# Patient Record
Sex: FEMALE | Race: WHITE | NOT HISPANIC OR LATINO | Employment: FULL TIME | URBAN - METROPOLITAN AREA
[De-identification: names, ages, dates, MRNs, and addresses within clinical notes are randomized per-mention and may not be internally consistent; named-entity substitution may affect disease eponyms.]

---

## 2017-01-20 ENCOUNTER — ALLSCRIPTS OFFICE VISIT (OUTPATIENT)
Dept: OTHER | Facility: OTHER | Age: 37
End: 2017-01-20

## 2017-03-22 ENCOUNTER — GENERIC CONVERSION - ENCOUNTER (OUTPATIENT)
Dept: OTHER | Facility: OTHER | Age: 37
End: 2017-03-22

## 2018-01-12 VITALS
HEIGHT: 64 IN | SYSTOLIC BLOOD PRESSURE: 104 MMHG | BODY MASS INDEX: 21.37 KG/M2 | WEIGHT: 125.19 LBS | HEART RATE: 66 BPM | RESPIRATION RATE: 12 BRPM | DIASTOLIC BLOOD PRESSURE: 70 MMHG

## 2018-01-12 NOTE — PROGRESS NOTES
Assessment    1  Annual physical exam (V70 0) (Z00 00)    A/P  1  physical exam: we have conducted this and have completed your form  we have also given you a flu shot today    rto prn    Pap test is due after 03/20/2017  Plan  Annual physical exam    · Follow-up visit in 1 year Evaluation and Treatment  Follow-up  Status: Complete  Done:  [id number]    Chief Complaint  Patient presents to the office today for work PE  PPD results from 02/2016 were printed to give to the patient  She will check if this is OK for her employer; if not she will come back  Pap test is due after 03/20/2017  Flu shot is deferred for 2016/2017  Tdap was last administered on 06/02/2015  History of Present Illness  HPI: Here today for pe for work    starting a new job       has not had any alcohol now for 9 months and is feeling great  continues to attend meetings and has a great support network  Review of Systems    Constitutional: No fever, no chills, feels well, no tiredness, no recent weight gain or weight loss  ENT: no complaints of earache, no loss of hearing, no nose bleeds, no nasal discharge, no sore throat, no hoarseness  Cardiovascular: No complaints of slow heart rate, no fast heart rate, no chest pain, no palpitations, no leg claudication, no lower extremity edema  Respiratory: No complaints of shortness of breath, no wheezing, no cough, no SOB on exertion, no orthopnea, no PND  Gastrointestinal: No complaints of abdominal pain, no constipation, no nausea or vomiting, no diarrhea, no bloody stools  Genitourinary: No complaints of dysuria, no incontinence, no pelvic pain, no dysmenorrhea, no vaginal discharge or bleeding  Musculoskeletal: No complaints of arthralgias, no myalgias, no joint swelling or stiffness, no limb pain or swelling  Integumentary: No complaints of skin rash or lesions, no itching, no skin wounds, no breast pain or lump     Neurological: No complaints of headache, no confusion, no convulsions, no numbness, no dizziness or fainting, no tingling, no limb weakness, no difficulty walking  Psychiatric: Not suicidal, no sleep disturbance, no anxiety or depression, no change in personality, no emotional problems  Active Problems    1  Alcohol related seizure (780 39) (R56 9)   2  Alcoholism (303 90) (F10 20)   3  Alcoholism in remission (303 93) (F10 21)   4  ASCUS favor benign (796 9)   5  Blood tests for routine general physical examination (V72 62) (Z00 00)   6  Dysfunctional uterine bleeding (626 8) (N93 8)   7  Encounter for routine gynecological examination (V72 31) (Z01 419)   8  HPV (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 4) (A63 0)   9  Need for prophylactic vaccination and inoculation against influenza (V04 81) (Z23)   10  Need for vaccination for DTaP (V06 1) (Z23)   11  Other insomnia (780 52) (G47 09)   12  Screening for HPV (human papillomavirus) (V73 81) (Z11 51)   13   Tuberculosis screening (V74 1) (Z11 1)    Past Medical History    · History of Bruising, spontaneous (782 7) (R23 3)   · History of Endometrial polyp (621 0) (N84 0)   · History of Mastodynia (611 71) (N64 4)   · History of Nulliparity (V61 8)   · History of Prolonged menstruation (626 2) (N92 1)   · History of Well adult on routine health check (V70 0) (Z00 00)   · History of [place] teeth extracted (525 50,525 10) (K08 499)    Surgical History    · History of Closed Treatment Of Mandibular Fracture On The Left   · History of Colposcopy Cervix With Biopsy(S) With Endocervical Curettage   · History of Dilation And Curettage   · History of Inguinal Hernia Repair   · History of Knee Arthroscopy   · History of Nose Surgery    Family History  Mother    · Family history of Family Health Status Of Mother - Good  Father    · Family history of Hyperlipidemia   · Family history of Hypertension (V17 49)  Maternal Grandmother    · Family history of Osteoporosis (V17 81)   · Family history of Tuberculosis  Paternal Grandmother    · Family history of Alzheimer Disease  Maternal Grandfather    · Family history of Stroke Syndrome (V17 1)  Paternal Grandfather    · Family history of Alzheimer Disease    Social History    · Daily caffeinated coffee consumption   · Denied: History of Drug Use   · Exercises moderately 3 or more times a week   · Former consumption of alcohol (V11 3) (Q83 180)   · Never a smoker   · Occasional caffeine consumption   · Denied: History of Uses drugs daily    Current Meds   1  TraZODone HCl - 50 MG Oral Tablet; take 1 to 2 tablets at bedtime; Therapy: 76HZM2815 to (Evaluate:40Mea4243)  Requested for: 22Ssq1720; Last   Rx:56Sqt3489 Ordered    Allergies    1  Sulfa Drugs    2  No Known Environmental Allergies   3  No Known Food Allergies    Vitals   Recorded: 35KNN7881 51:87TA   Systolic 619   Diastolic 62   Heart Rate 76   Respiration 12   Height 5 ft 4 5 in   Weight 125 lb 6 08 oz   BMI Calculated 21 19   BSA Calculated 1 61     Physical Exam    Constitutional   General appearance: No acute distress, well appearing and well nourished  Eyes   Pupils and irises: Equal, round, reactive to light  Ophthalmoscopic examination: Normal fundi and optic discs  Ears, Nose, Mouth, and Throat   Otoscopic examination: Tympanic membranes translucent with normal light reflex  Canals patent without erythema  Nasal mucosa, septum, and turbinates: Normal without edema or erythema  Lips, teeth, and gums: Normal, good dentition  Oropharynx: Normal with no erythema, edema, exudate or lesions  Neck   Neck: Supple, symmetric, trachea midline, no masses  Pulmonary   Auscultation of lungs: Clear to auscultation  Cardiovascular   Auscultation of heart: Normal rate and rhythm, normal S1 and S2, no murmurs  Abdomen   Abdomen: Non-tender, no masses  Liver and spleen: No hepatomegaly or splenomegaly  Lymphatic   Palpation of lymph nodes in neck: No lymphadenopathy      Musculoskeletal   Gait and station: Normal     Range of motion: Normal     Skin   Skin and subcutaneous tissue: Normal without rashes or lesions  Neurologic   Reflexes: 2+ and symmetric  Psychiatric   Orientation to person, place, and time: Normal     Mood and affect: Normal        Health Management  Encounter for routine gynecological examination   (1) THIN PREP PAP WITH IMAGING; every 2 years; Last 05YHN4768; Next Due:  20Mar2017;  Active    Signatures   Electronically signed by : Jaleesa Osorio; Nov 17 2016 10:55AM EST                       (Author)    Electronically signed by : Damon Esquivel DO; Nov 17 2016  1:37PM EST
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
## 2018-01-15 NOTE — MISCELLANEOUS
Message   Patient missed work the week of 6-6-16 to return on mon 6-13-16   Return to work or school:   Sheridan Malave is under my professional care   She was seen in my office on 06-   She is able to return to work on  06-            [address] [place] [address]    Electronically signed by : Lucrecia Alejandro, ; Jun 9 2016 12:28PM EST                       (Author)

## 2018-01-15 NOTE — PROCEDURES
Procedures by Mukesh Sahu MD at 2016   2:00 PM      Author:  Mukesh Sahu MD Service:  Neurology Author Type:  Physician     Filed:  2016  3:26 PM Date of Service:  2016  2:00 PM Status:  Signed     :  Mukesh Sahu MD (Physician)            ELECTROENCEPHALOGRAM (EEG)      Patient Name:  Christie Wang  MRN: [id number]   :  1980 File #: [facility]    Age: 38 y o  Encounter #: [id number]   Date performed: 2016            Report date: 2016          Study type: Routine EEG    ICD 10 diagnosis: Spells/Fit NOS R56 9    Start time: 08:32 End time: 09:04     -------------------------------------------------------------------------------------------------------------------   Patient History: This recording was observed in a 38 y o  female with history of 2 seizures  in setting of alcohol use to determine risk of epilepsy  Medications: none listed    -------------------------------------------------------------------------------------------------------------------   Description of Procedure:  ·  32 channel digital recording with electrodes placed according to the International 10-20 system with additional  T1/T2 electrodes, EOG, EKG, and simultaneous  video  The recording was technically satisfactory  -------------------------------------------------------------------------------------------------------------------   EEG Description:    The recording was performed with the patient awake and drowsy  She was  fully oriented  During wakefulness, there were long runs of well regulated, low amplitude, posteriorly  dominant, symmetric 11 cps alpha rhythm that attenuate with eye opening  There were symmetric low amplitude, frontally dominant beta activities  With drowsiness, alpha activity attenuated and was replaced by diffusely distributed theta and beta activities   During drowsiness, there was a single  sharp transient that was isolated to T4, without an adjacent field and without a clear aftercoming slow wave  Deeper sleep was not attained  -------------------------------------------------------------------------------------------------------------------   Activation Procedures:  Hyperventilation was performed for 3-4  minutes and did not produce any changes  Stepped photic stimulation between 1-20 cps was performed and induced symmetric  photic driving  Other findings: The single lead ECG demonstrated a regular rhythm     -------------------------------------------------------------------------------------------------------------------   EEG Interpretation: This Routine EEG recorded during wakefulness and drowsiness is normal         Donna Camarena MD   [address] [facility]               Received for:Axel BRUNER    Jul 14 2016  3:25PM [facility] Standard Time

## 2018-01-18 ENCOUNTER — ALLSCRIPTS OFFICE VISIT (OUTPATIENT)
Dept: OTHER | Facility: OTHER | Age: 38
End: 2018-01-18

## 2018-01-18 ENCOUNTER — APPOINTMENT (EMERGENCY)
Dept: RADIOLOGY | Facility: HOSPITAL | Age: 38
End: 2018-01-18
Payer: COMMERCIAL

## 2018-01-18 ENCOUNTER — HOSPITAL ENCOUNTER (OUTPATIENT)
Facility: HOSPITAL | Age: 38
Setting detail: OBSERVATION
Discharge: HOME | End: 2018-01-20
Attending: EMERGENCY MEDICINE | Admitting: HOSPITALIST
Payer: COMMERCIAL

## 2018-01-18 DIAGNOSIS — F10.239 ALCOHOL WITHDRAWAL (HCC): Primary | ICD-10-CM

## 2018-01-18 DIAGNOSIS — E87.6 HYPOKALEMIA: ICD-10-CM

## 2018-01-18 DIAGNOSIS — R56.9 WITHDRAWAL SEIZURES, WITH UNSPECIFIED COMPLICATION (HCC): ICD-10-CM

## 2018-01-18 DIAGNOSIS — R44.3 HALLUCINATIONS: ICD-10-CM

## 2018-01-18 DIAGNOSIS — F19.239 WITHDRAWAL SEIZURES, WITH UNSPECIFIED COMPLICATION (HCC): ICD-10-CM

## 2018-01-18 LAB
ALBUMIN SERPL BCP-MCNC: 4.1 G/DL (ref 3.5–5)
ALP SERPL-CCNC: 113 U/L (ref 46–116)
ALT SERPL W P-5'-P-CCNC: 88 U/L (ref 12–78)
AMPHETAMINES SERPL QL SCN: NEGATIVE
ANION GAP SERPL CALCULATED.3IONS-SCNC: 8 MMOL/L (ref 4–13)
AST SERPL W P-5'-P-CCNC: 142 U/L (ref 5–45)
BARBITURATES UR QL: NEGATIVE
BENZODIAZ UR QL: NEGATIVE
BILIRUB SERPL-MCNC: 2.06 MG/DL (ref 0.2–1)
BUN SERPL-MCNC: 10 MG/DL (ref 5–25)
CALCIUM SERPL-MCNC: 9.4 MG/DL (ref 8.3–10.1)
CHLORIDE SERPL-SCNC: 98 MMOL/L (ref 100–108)
CO2 SERPL-SCNC: 29 MMOL/L (ref 21–32)
COCAINE UR QL: NEGATIVE
CREAT SERPL-MCNC: 0.73 MG/DL (ref 0.6–1.3)
ETHANOL EXG-MCNC: 0 MG/DL
EXT PREG TEST URINE: NEGATIVE
GFR SERPL CREATININE-BSD FRML MDRD: 104 ML/MIN/1.73SQ M
GLUCOSE SERPL-MCNC: 82 MG/DL (ref 65–140)
MAGNESIUM SERPL-MCNC: 1.7 MG/DL (ref 1.6–2.6)
METHADONE UR QL: NEGATIVE
OPIATES UR QL SCN: NEGATIVE
PCP UR QL: NEGATIVE
PHOSPHATE SERPL-MCNC: 3.6 MG/DL (ref 2.7–4.5)
POTASSIUM SERPL-SCNC: 3.3 MMOL/L (ref 3.5–5.3)
PROT SERPL-MCNC: 8.2 G/DL (ref 6.4–8.2)
SODIUM SERPL-SCNC: 135 MMOL/L (ref 136–145)
THC UR QL: NEGATIVE

## 2018-01-18 PROCEDURE — 84100 ASSAY OF PHOSPHORUS: CPT | Performed by: EMERGENCY MEDICINE

## 2018-01-18 PROCEDURE — 80307 DRUG TEST PRSMV CHEM ANLYZR: CPT | Performed by: EMERGENCY MEDICINE

## 2018-01-18 PROCEDURE — 96365 THER/PROPH/DIAG IV INF INIT: CPT

## 2018-01-18 PROCEDURE — 70450 CT HEAD/BRAIN W/O DYE: CPT

## 2018-01-18 PROCEDURE — 81025 URINE PREGNANCY TEST: CPT | Performed by: EMERGENCY MEDICINE

## 2018-01-18 PROCEDURE — 82075 ASSAY OF BREATH ETHANOL: CPT | Performed by: EMERGENCY MEDICINE

## 2018-01-18 PROCEDURE — 93005 ELECTROCARDIOGRAM TRACING: CPT

## 2018-01-18 PROCEDURE — 83735 ASSAY OF MAGNESIUM: CPT | Performed by: EMERGENCY MEDICINE

## 2018-01-18 PROCEDURE — 36415 COLL VENOUS BLD VENIPUNCTURE: CPT | Performed by: EMERGENCY MEDICINE

## 2018-01-18 PROCEDURE — 80053 COMPREHEN METABOLIC PANEL: CPT | Performed by: EMERGENCY MEDICINE

## 2018-01-18 PROCEDURE — 96361 HYDRATE IV INFUSION ADD-ON: CPT

## 2018-01-18 PROCEDURE — 93005 ELECTROCARDIOGRAM TRACING: CPT | Performed by: EMERGENCY MEDICINE

## 2018-01-18 RX ORDER — SODIUM CHLORIDE 9 MG/ML
100 INJECTION, SOLUTION INTRAVENOUS CONTINUOUS
Status: DISPENSED | OUTPATIENT
Start: 2018-01-19 | End: 2018-01-19

## 2018-01-18 RX ORDER — FOLIC ACID 1 MG/1
1 TABLET ORAL DAILY
Status: DISCONTINUED | OUTPATIENT
Start: 2018-01-19 | End: 2018-01-20 | Stop reason: HOSPADM

## 2018-01-18 RX ORDER — POTASSIUM CHLORIDE 20 MEQ/1
40 TABLET, EXTENDED RELEASE ORAL ONCE
Status: COMPLETED | OUTPATIENT
Start: 2018-01-18 | End: 2018-01-18

## 2018-01-18 RX ORDER — LORAZEPAM 2 MG/ML
1 INJECTION INTRAMUSCULAR EVERY 2 HOUR PRN
Status: DISCONTINUED | OUTPATIENT
Start: 2018-01-18 | End: 2018-01-20 | Stop reason: HOSPADM

## 2018-01-18 RX ORDER — THIAMINE MONONITRATE (VIT B1) 100 MG
100 TABLET ORAL DAILY
Status: DISCONTINUED | OUTPATIENT
Start: 2018-01-19 | End: 2018-01-20 | Stop reason: HOSPADM

## 2018-01-18 RX ORDER — LORAZEPAM 0.5 MG/1
0.5 TABLET ORAL ONCE
Status: COMPLETED | OUTPATIENT
Start: 2018-01-18 | End: 2018-01-18

## 2018-01-18 RX ORDER — MAGNESIUM SULFATE HEPTAHYDRATE 40 MG/ML
2 INJECTION, SOLUTION INTRAVENOUS ONCE
Status: COMPLETED | OUTPATIENT
Start: 2018-01-18 | End: 2018-01-19

## 2018-01-18 RX ORDER — CHLORDIAZEPOXIDE HYDROCHLORIDE 10 MG/1
10 CAPSULE, GELATIN COATED ORAL EVERY 8 HOURS SCHEDULED
Status: DISCONTINUED | OUTPATIENT
Start: 2018-01-19 | End: 2018-01-20

## 2018-01-18 RX ADMIN — LORAZEPAM 0.5 MG: 0.5 TABLET ORAL at 23:03

## 2018-01-18 RX ADMIN — POTASSIUM CHLORIDE 40 MEQ: 1500 TABLET, EXTENDED RELEASE ORAL at 23:03

## 2018-01-18 RX ADMIN — SODIUM CHLORIDE 1000 ML: 0.9 INJECTION, SOLUTION INTRAVENOUS at 21:06

## 2018-01-18 RX ADMIN — MAGNESIUM SULFATE HEPTAHYDRATE 2 G: 40 INJECTION, SOLUTION INTRAVENOUS at 23:04

## 2018-01-19 ENCOUNTER — APPOINTMENT (OUTPATIENT)
Dept: NEUROLOGY | Facility: AMBULATORY SURGERY CENTER | Age: 38
End: 2018-01-19
Payer: COMMERCIAL

## 2018-01-19 ENCOUNTER — GENERIC CONVERSION - ENCOUNTER (OUTPATIENT)
Dept: OTHER | Facility: OTHER | Age: 38
End: 2018-01-19

## 2018-01-19 ENCOUNTER — APPOINTMENT (OUTPATIENT)
Dept: RADIOLOGY | Facility: HOSPITAL | Age: 38
End: 2018-01-19
Payer: COMMERCIAL

## 2018-01-19 PROBLEM — F10.239 ALCOHOL WITHDRAWAL (HCC): Status: ACTIVE | Noted: 2018-01-19

## 2018-01-19 PROBLEM — R56.9 WITHDRAWAL SEIZURES, WITH UNSPECIFIED COMPLICATION (HCC): Status: ACTIVE | Noted: 2018-01-19

## 2018-01-19 PROBLEM — F19.239 WITHDRAWAL SEIZURES, WITH UNSPECIFIED COMPLICATION (HCC): Status: ACTIVE | Noted: 2018-01-19

## 2018-01-19 PROBLEM — F19.939 WITHDRAWAL SEIZURES, WITH UNSPECIFIED COMPLICATION (HCC): Status: ACTIVE | Noted: 2018-01-19

## 2018-01-19 PROBLEM — F10.939 ALCOHOL WITHDRAWAL (HCC): Status: ACTIVE | Noted: 2018-01-19

## 2018-01-19 PROBLEM — R44.3 HALLUCINATIONS: Status: ACTIVE | Noted: 2018-01-19

## 2018-01-19 PROBLEM — E87.6 HYPOKALEMIA: Status: ACTIVE | Noted: 2018-01-19

## 2018-01-19 LAB
AMMONIA PLAS-SCNC: 14 UMOL/L (ref 11–35)
ANION GAP SERPL CALCULATED.3IONS-SCNC: 7 MMOL/L (ref 4–13)
ATRIAL RATE: 84 BPM
BASOPHILS # BLD AUTO: 0.02 THOUSANDS/ΜL (ref 0–0.1)
BASOPHILS # BLD AUTO: 0.05 THOUSANDS/ΜL (ref 0–0.1)
BASOPHILS NFR BLD AUTO: 0 % (ref 0–1)
BASOPHILS NFR BLD AUTO: 1 % (ref 0–1)
BUN SERPL-MCNC: 7 MG/DL (ref 5–25)
CALCIUM SERPL-MCNC: 8.3 MG/DL (ref 8.3–10.1)
CHLORIDE SERPL-SCNC: 106 MMOL/L (ref 100–108)
CO2 SERPL-SCNC: 25 MMOL/L (ref 21–32)
CREAT SERPL-MCNC: 0.57 MG/DL (ref 0.6–1.3)
EOSINOPHIL # BLD AUTO: 0.22 THOUSAND/ΜL (ref 0–0.61)
EOSINOPHIL # BLD AUTO: 0.24 THOUSAND/ΜL (ref 0–0.61)
EOSINOPHIL NFR BLD AUTO: 4 % (ref 0–6)
EOSINOPHIL NFR BLD AUTO: 5 % (ref 0–6)
ERYTHROCYTE [DISTWIDTH] IN BLOOD BY AUTOMATED COUNT: 13.1 % (ref 11.6–15.1)
ERYTHROCYTE [DISTWIDTH] IN BLOOD BY AUTOMATED COUNT: 13.2 % (ref 11.6–15.1)
GFR SERPL CREATININE-BSD FRML MDRD: 117 ML/MIN/1.73SQ M
GLUCOSE SERPL-MCNC: 102 MG/DL (ref 65–140)
HCT VFR BLD AUTO: 33.1 % (ref 34.8–46.1)
HCT VFR BLD AUTO: 33.4 % (ref 34.8–46.1)
HGB BLD-MCNC: 11.6 G/DL (ref 11.5–15.4)
HGB BLD-MCNC: 11.6 G/DL (ref 11.5–15.4)
LYMPHOCYTES # BLD AUTO: 1.22 THOUSANDS/ΜL (ref 0.6–4.47)
LYMPHOCYTES # BLD AUTO: 1.45 THOUSANDS/ΜL (ref 0.6–4.47)
LYMPHOCYTES NFR BLD AUTO: 25 % (ref 14–44)
LYMPHOCYTES NFR BLD AUTO: 27 % (ref 14–44)
MAGNESIUM SERPL-MCNC: 2.1 MG/DL (ref 1.6–2.6)
MCH RBC QN AUTO: 34.3 PG (ref 26.8–34.3)
MCH RBC QN AUTO: 34.4 PG (ref 26.8–34.3)
MCHC RBC AUTO-ENTMCNC: 34.7 G/DL (ref 31.4–37.4)
MCHC RBC AUTO-ENTMCNC: 35 G/DL (ref 31.4–37.4)
MCV RBC AUTO: 98 FL (ref 82–98)
MCV RBC AUTO: 99 FL (ref 82–98)
MONOCYTES # BLD AUTO: 0.8 THOUSAND/ΜL (ref 0.17–1.22)
MONOCYTES # BLD AUTO: 0.83 THOUSAND/ΜL (ref 0.17–1.22)
MONOCYTES NFR BLD AUTO: 15 % (ref 4–12)
MONOCYTES NFR BLD AUTO: 17 % (ref 4–12)
NEUTROPHILS # BLD AUTO: 2.59 THOUSANDS/ΜL (ref 1.85–7.62)
NEUTROPHILS # BLD AUTO: 2.83 THOUSANDS/ΜL (ref 1.85–7.62)
NEUTS SEG NFR BLD AUTO: 53 % (ref 43–75)
NEUTS SEG NFR BLD AUTO: 53 % (ref 43–75)
NRBC BLD AUTO-RTO: 0 /100 WBCS
NRBC BLD AUTO-RTO: 0 /100 WBCS
P AXIS: 60 DEGREES
PHOSPHATE SERPL-MCNC: 2.5 MG/DL (ref 2.7–4.5)
PLATELET # BLD AUTO: 175 THOUSANDS/UL (ref 149–390)
PLATELET # BLD AUTO: 182 THOUSANDS/UL (ref 149–390)
PMV BLD AUTO: 10.1 FL (ref 8.9–12.7)
PMV BLD AUTO: 9.9 FL (ref 8.9–12.7)
POTASSIUM SERPL-SCNC: 3.6 MMOL/L (ref 3.5–5.3)
PR INTERVAL: 120 MS
QRS AXIS: 58 DEGREES
QRSD INTERVAL: 86 MS
QT INTERVAL: 376 MS
QTC INTERVAL: 444 MS
RBC # BLD AUTO: 3.37 MILLION/UL (ref 3.81–5.12)
RBC # BLD AUTO: 3.38 MILLION/UL (ref 3.81–5.12)
SODIUM SERPL-SCNC: 138 MMOL/L (ref 136–145)
T WAVE AXIS: 35 DEGREES
VENTRICULAR RATE: 84 BPM
WBC # BLD AUTO: 4.9 THOUSAND/UL (ref 4.31–10.16)
WBC # BLD AUTO: 5.36 THOUSAND/UL (ref 4.31–10.16)

## 2018-01-19 PROCEDURE — 82140 ASSAY OF AMMONIA: CPT | Performed by: EMERGENCY MEDICINE

## 2018-01-19 PROCEDURE — 84100 ASSAY OF PHOSPHORUS: CPT | Performed by: HOSPITALIST

## 2018-01-19 PROCEDURE — 85025 COMPLETE CBC W/AUTO DIFF WBC: CPT | Performed by: EMERGENCY MEDICINE

## 2018-01-19 PROCEDURE — 80048 BASIC METABOLIC PNL TOTAL CA: CPT | Performed by: HOSPITALIST

## 2018-01-19 PROCEDURE — A9585 GADOBUTROL INJECTION: HCPCS | Performed by: HOSPITALIST

## 2018-01-19 PROCEDURE — 99285 EMERGENCY DEPT VISIT HI MDM: CPT

## 2018-01-19 PROCEDURE — 95816 EEG AWAKE AND DROWSY: CPT

## 2018-01-19 PROCEDURE — 70553 MRI BRAIN STEM W/O & W/DYE: CPT

## 2018-01-19 PROCEDURE — 83735 ASSAY OF MAGNESIUM: CPT | Performed by: HOSPITALIST

## 2018-01-19 RX ORDER — ACETAMINOPHEN 325 MG/1
650 TABLET ORAL EVERY 6 HOURS PRN
Status: DISCONTINUED | OUTPATIENT
Start: 2018-01-19 | End: 2018-01-20 | Stop reason: HOSPADM

## 2018-01-19 RX ORDER — SENNOSIDES 8.6 MG
1 TABLET ORAL DAILY
Status: DISCONTINUED | OUTPATIENT
Start: 2018-01-19 | End: 2018-01-20 | Stop reason: HOSPADM

## 2018-01-19 RX ORDER — ONDANSETRON 2 MG/ML
4 INJECTION INTRAMUSCULAR; INTRAVENOUS EVERY 6 HOURS PRN
Status: DISCONTINUED | OUTPATIENT
Start: 2018-01-19 | End: 2018-01-20 | Stop reason: HOSPADM

## 2018-01-19 RX ORDER — DOCUSATE SODIUM 100 MG/1
100 CAPSULE, LIQUID FILLED ORAL 2 TIMES DAILY
Status: DISCONTINUED | OUTPATIENT
Start: 2018-01-19 | End: 2018-01-20 | Stop reason: HOSPADM

## 2018-01-19 RX ORDER — POLYETHYLENE GLYCOL 3350 17 G/17G
17 POWDER, FOR SOLUTION ORAL DAILY PRN
Status: DISCONTINUED | OUTPATIENT
Start: 2018-01-19 | End: 2018-01-20 | Stop reason: HOSPADM

## 2018-01-19 RX ADMIN — SODIUM CHLORIDE 100 ML/HR: 0.9 INJECTION, SOLUTION INTRAVENOUS at 00:57

## 2018-01-19 RX ADMIN — Medication 100 MG: at 09:50

## 2018-01-19 RX ADMIN — FOLIC ACID 1 MG: 1 TABLET ORAL at 09:49

## 2018-01-19 RX ADMIN — LORAZEPAM 1 MG: 2 INJECTION INTRAMUSCULAR; INTRAVENOUS at 11:48

## 2018-01-19 RX ADMIN — CHLORDIAZEPOXIDE HYDROCHLORIDE 10 MG: 10 CAPSULE ORAL at 13:05

## 2018-01-19 RX ADMIN — LORAZEPAM 1 MG: 2 INJECTION INTRAMUSCULAR; INTRAVENOUS at 05:30

## 2018-01-19 RX ADMIN — CHLORDIAZEPOXIDE HYDROCHLORIDE 10 MG: 10 CAPSULE ORAL at 21:08

## 2018-01-19 RX ADMIN — FOLIC ACID: 5 INJECTION, SOLUTION INTRAMUSCULAR; INTRAVENOUS; SUBCUTANEOUS at 00:57

## 2018-01-19 RX ADMIN — CHLORDIAZEPOXIDE HYDROCHLORIDE 10 MG: 10 CAPSULE ORAL at 05:30

## 2018-01-19 RX ADMIN — GADOBUTROL 5 ML: 604.72 INJECTION INTRAVENOUS at 19:04

## 2018-01-19 RX ADMIN — CHLORDIAZEPOXIDE HYDROCHLORIDE 10 MG: 10 CAPSULE ORAL at 01:13

## 2018-01-19 NOTE — CONSULTS
Consultation - Taurus King U  49  42 y o  female MRN: [id number]  Unit/Bed#: CW2 217-03 Encounter: [id number]      Chief Complaint: "I was having some hallucinations"    History of Present Illness   Physician Requesting Consult: Davon Santizo MD  Reason for Consult / Principal Problem: hallucinations    Jacinda Maciel is a 42 y o  female presents with alcohol withdrawal symptoms including auditory and visual hallucinations three days after she stopped drinking  She states that the last time that she had a drink was Saturday night at about 10PM  She states that she had a seizure the following night around 11PM  She states that she knows this because she woke up upstairs with her tongue hurting  She states that this is not the first withdrawal seizure that she has had  She states that she drinks about 1/2 of 1/5 of hard liquor as well as 2-3 beers daily  She states that she has been drinking for at least the past 5 years  She denies any other drugs  Denies any symptoms of depression or anxiety  Denies ever being in a rehab  Psychiatric Review Of Systems:  sleep: yes  appetite changes: yes  weight changes: no  energy/anergy: no  interest/pleasure/anhedonia: no  somatic symptoms: no  anxiety/panic: no  bartolome: no  guilty/hopeless: no  self injurious behavior/risky behavior: no    Historical Information   Past Psychiatric History:   None  Currently in treatment with none  Past Suicide attempts:  None  Past Violent behavior:  None  Past Psychiatric medication trial:  None    Substance Abuse History:  She started drinking at least 5 years ago  She states that she drinks about 1/2 of 1/5 of liquor and 2-3 beers daily  She has symptoms of withdrawal more than once  She denies any drugs      I have assessed this patient for substance use within the past 12 months     History of IP/OP rehabilitation program: denies  Smoking history:  Denies  Family Psychiatric History:   She has two grandparents that had alcohol abuse disorder  Her father has depression    Social History  Education: college graduate  Learning Disabilities: none  Marital history: single  Living arrangement, social support: She lives alone  Occupational History: unemployed  Functioning Relationships: good support system    Other Pertinent History: None    Traumatic History:   Abuse: None  Other Traumatic Events: None    Past Medical History:   Diagnosis Date    Seizures ([facility] [address] )        Medical Review Of Systems:  Review of Systems - Negative except hand tremor, all other systems reviewed were negative    Meds/Allergies   current meds:   Current Facility-Administered Medications   Medication Dose Route Frequency    acetaminophen (TYLENOL) tablet 650 mg  650 mg Oral Q6H PRN    chlordiazePOXIDE (LIBRIUM) capsule 10 mg  10 mg Oral Q8H [address]    docusate sodium (COLACE) capsule 100 mg  100 mg Oral BID    folic acid (FOLVITE) tablet 1 mg  1 mg Oral Daily    LORazepam (ATIVAN) 2 mg/mL injection 1 mg  1 mg Intravenous Q2H PRN    ondansetron (ZOFRAN) injection 4 mg  4 mg Intravenous Q6H PRN    polyethylene glycol (MIRALAX) packet 17 g  17 g Oral Daily PRN    senna (SENOKOT) tablet 8 6 mg  1 tablet Oral Daily    thiamine (VITAMIN B1) tablet 100 mg  100 mg Oral Daily     Allergies   Allergen Reactions    Sulfa Antibiotics        Objective   Vital signs in last 24 hours:  Temp:  [97 7 °F (36 5 °C)-98 6 °F (37 °C)] 98 6 °F (37 °C)  HR:  [72-98] 89  Resp:  [16-18] 16  BP: (110-140)/(68-87) 117/75      Intake/Output Summary (Last 24 hours) at 01/19/18 1304  Last data filed at 01/19/18 1237   Gross per 24 hour   Intake             1580 ml   Output                0 ml   Net             1580 ml       Mental Status Evaluation:  Appearance:  age appropriate   Behavior:  normal   Speech:  normal pitch and normal volume   Mood:  euthymic   Affect:  mood-congruent   Language: naming objects and repeating phrases   Thought Process:  goal directed Associations: intact associations   Thought Content:  normal   Perceptual Disturbances: None   Risk Potential: She denies any suicidal thoughts plans or intent   Sensorium:  person, place, time/date and situation   Memory:  recent and remote memory grossly intact   Cognition:  grossly intact   Consciousness:  alert and awake    Attention: attention span and concentration were age appropriate   Intellect: within normal limits   Fund of Knowledge: awareness of current events: Fair   Insight:  fair   Judgment: fair   Muscle Strength and Tone: Within normal limits   Gait/Station: normal gait/station   Motor Activity: no abnormal movements     Lab Results:    Lab Results   Component Value Date    WBC 4 90 01/19/2018    HGB 11 6 01/19/2018    HCT 33 4 (L) 01/19/2018    MCV 99 (H) 01/19/2018     01/19/2018     Lab Results   Component Value Date    GLUCOSE 102 01/19/2018    CALCIUM 8 3 01/19/2018     01/19/2018    K 3 6 01/19/2018    CO2 25 01/19/2018     01/19/2018    BUN 7 01/19/2018    CREATININE 0 57 (L) 01/19/2018         Code Status: )Level 1 - Full Code    Assessment/Plan     Assessment:  Teresa Oropeza is a 42 y o  female who presents with symptoms of withdrawal from alcohol  She has improved  She denies any hallucinations at this moment  She did have classic hallucinations from withdrawal    Diagnosis: alcohol dependence with withdrawal, uncomplicated O26 820  Plan: Continue medical management  Refer to HOST program   informed  No other intervention at this moment  Patient can be discharged when medically cleared  I will sign off  Risks, benefits and possible side effects of Medications:   Risks, benefits, and possible side effects of medications explained to patient and patient verbalizes understanding          Hudson Hernandez MD

## 2018-01-19 NOTE — ASSESSMENT & PLAN NOTE
Secondary to alcohol abuse  Monitor electrolytes, replaced as needed  Check potassium, magnesium and phosphorus levels in a m

## 2018-01-19 NOTE — ASSESSMENT & PLAN NOTE
Patient has a withdrawal seizures was evaluated by epileptologist Libby Lebron in July 2017  EEG  was normal that time however patient declined  offered antiepileptic drug  Seizure precautions  Ativan p r n    Patient suspecst she might had a seizure episode on Sunday   Will ask for neurology evaluation

## 2018-01-19 NOTE — ED PROVIDER NOTES
History  Chief Complaint   Patient presents with    Alcohol Intoxication     pt has binge drinking tendancies and is having with drawl symptoms, tired feeling and some weakness- her last drink was Sunday- pt having some issues with auditory halucinations and seeing things yesterday  41 y/o female, hx of alcohol abuse, presents to the ED for alcohol withdrawal x 5 days  Patient states that she typically drinks about a quarter of a bottle of rum and a few beers a day  States that she has been doing this for over a year and last drank 4 days ago  She states that 4 days ago she went to bed around 9:00 p m  and woke up 2 hours later with bruises on her head and bites on her tongue  She states that she feels like she may have had a seizure during this time but does not remember anything and states that it was unwitnessed  She states that she has had alcohol withdrawal seizures in the past, which was about 1 year prior when she stopped drinking for 6 months  She states that she was admitted at that time and seen by Neurology and was told that she did not have epilepsy and that the seizures were most likely secondary to alcohol  She reports that she was feeling shaky the last few days and that she developed hallucinations last night, both visual and auditory  She states that she was at her house and thinks she saw ghosts  She reports that she went to her parent's house and continued to see them  She states that she also heard voices singing to her  Denies any command hallucinations or SI  States that she has never had this happen before  She continued to have hallucinations earlier today but denies any currently  She denies any current symptoms of abdominal pain, diaphoresis, nausea/vomiting/diarrhea, chest pain, shortness of breath, or anxiety          History provided by:  Patient and parent  Alcohol Intoxication   Similar prior episodes: yes    Severity:  Moderate  Onset quality:  Gradual  Duration:  4 days  Timing:  Constant  Progression:  Improving  Chronicity:  Recurrent  Suspected agents:  Alcohol  Associated symptoms: hallucinations    Associated symptoms: no abdominal pain, no agitation, no confusion, no headaches, no nausea, no shortness of breath, no suicidal ideation, no vomiting and no weakness    Risk factors: no mental illness and no psychiatric hx        None       Past Medical History:   Diagnosis Date    Seizures ([facility] [address] )        Past Surgical History:   Procedure Laterality Date    ANTERIOR CRUCIATE LIGAMENT REPAIR      CLOSED REDUCTION MANDIBLE Bilateral 6/26/2016    Procedure: CLOSED REDUCTION MANDIBLE;  Surgeon: Adrian Aaron DMD;  [facility];  Service:        History reviewed  No pertinent family history  I have reviewed and agree with the history as documented  Social History   Substance Use Topics    Smoking status: Never Smoker    Smokeless tobacco: Not on file    Alcohol use No        Review of Systems   Constitutional: Negative for chills and fever  HENT: Negative for congestion, ear pain and sore throat  Eyes: Negative for pain and visual disturbance  Respiratory: Negative for cough, shortness of breath and wheezing  Cardiovascular: Negative for chest pain and leg swelling  Gastrointestinal: Negative for abdominal pain, diarrhea, nausea and vomiting  Genitourinary: Negative for dysuria, frequency, hematuria and urgency  Musculoskeletal: Negative for neck pain and neck stiffness  Skin: Negative for rash and wound  Neurological: Negative for weakness, numbness and headaches  Psychiatric/Behavioral: Positive for hallucinations  Negative for agitation, confusion and suicidal ideas  All other systems reviewed and are negative        Physical Exam  ED Triage Vitals   Temperature Pulse Respirations Blood Pressure SpO2   01/18/18 1800 01/18/18 1800 01/18/18 1800 01/18/18 1800 01/18/18 1800   98 6 °F (37 °C) 96 18 140/82 98 %      Temp Source Heart Rate Source Patient Position - Orthostatic VS BP Location FiO2 (%)   01/19/18 1510 01/18/18 2107 01/18/18 2107 01/18/18 2107 --   Oral Monitor Lying Right arm       Pain Score       01/18/18 1800       No Pain           Orthostatic Vital Signs  Vitals:    01/19/18 0445 01/19/18 0700 01/19/18 1510 01/19/18 2025   BP: 116/73 117/75 120/70 122/78   Pulse: 80 89 94 93   Patient Position - Orthostatic VS:           Physical Exam   Constitutional: She is oriented to person, place, and time  She appears well-developed and well-nourished  HENT:   Head: Normocephalic and atraumatic  Mouth/Throat: Oropharynx is clear and moist    Small abrasion to the right forehead  No hemotympanum bilaterally   Eyes: EOM are normal  Pupils are equal, round, and reactive to light  Neck: Normal range of motion  Neck supple  No midline C, T, or L-spine tenderness  No bony step-offs  Full range of motion without any pain  Cardiovascular: Normal rate and regular rhythm  Pulmonary/Chest: Effort normal and breath sounds normal  No respiratory distress  She has no wheezes  She has no rales  She exhibits no tenderness  Abdominal: Soft  Bowel sounds are normal  She exhibits no distension  There is no tenderness  Musculoskeletal: Normal range of motion  Neurological: She is alert and oriented to person, place, and time  No focal deficits   Skin: Skin is warm and dry  Psychiatric: She has a normal mood and affect  Her speech is normal and behavior is normal  Judgment normal  She is not actively hallucinating  Cognition and memory are normal  She expresses no homicidal and no suicidal ideation  She expresses no suicidal plans and no homicidal plans  She is attentive  Nursing note and vitals reviewed        ED Medications  Medications   folic acid (FOLVITE) tablet 1 mg (1 mg Oral Given 1/19/18 0949)   thiamine (VITAMIN B1) tablet 100 mg (100 mg Oral Given 1/19/18 0950)   LORazepam (ATIVAN) 2 mg/mL injection 1 mg (1 mg Intravenous Given 1/19/18 1148)   chlordiazePOXIDE (LIBRIUM) capsule 10 mg (10 mg Oral Given 1/19/18 2108)   sodium chloride 0 9 % infusion (0 mL/hr Intravenous Stopped 1/19/18 1301)   docusate sodium (COLACE) capsule 100 mg (100 mg Oral Not Given 1/19/18 1902)   senna (SENOKOT) tablet 8 6 mg (8 6 mg Oral Not Given 1/19/18 0950)   polyethylene glycol (MIRALAX) packet 17 g (not administered)   ondansetron (ZOFRAN) injection 4 mg (not administered)   acetaminophen (TYLENOL) tablet 650 mg (not administered)   sodium chloride 0 9 % bolus 1,000 mL (0 mL Intravenous Stopped 1/18/18 2251)   potassium chloride (K-DUR,KLOR-CON) CR tablet 40 mEq (40 mEq Oral Given 1/18/18 2303)   magnesium sulfate 2 g/50 mL IVPB (premix) 2 g (0 g Intravenous Stopped 1/19/18 0058)   LORazepam (ATIVAN) tablet 0 5 mg (0 5 mg Oral Given 4/58/42 5553)   folic acid 1 mg, thiamine (VITAMIN B1) 100 mg in sodium chloride 0 9 % 50 mL IVPB ( Intravenous Stopped 1/19/18 0120)   Gadobutrol injection (SINGLE-DOSE) SOLN 5 mL (5 mL Intravenous Given 1/19/18 1904)       Diagnostic Studies  Results Reviewed     Procedure Component Value Units Date/Time    CBC and differential [[id number]]  (Abnormal) Collected:  01/19/18 0654    Lab Status:  Final result Specimen:  Blood from Arm, Right Updated:  01/19/18 0704     WBC 4 90 Thousand/uL      RBC 3 38 (L) Million/uL      Hemoglobin 11 6 g/dL      Hematocrit 33 4 (L) %      MCV 99 (H) fL      MCH 34 3 pg      MCHC 34 7 g/dL      RDW 13 2 %      MPV 9 9 fL      Platelets 568 Thousands/uL      nRBC 0 /100 WBCs      Neutrophils Relative 53 %      Lymphocytes Relative 25 %      Monocytes Relative 17 (H) %      Eosinophils Relative 5 %      Basophils Relative 0 %      Neutrophils Absolute 2 59 Thousands/µL      Lymphocytes Absolute 1 22 Thousands/µL      Monocytes Absolute 0 83 Thousand/µL      Eosinophils Absolute 0 24 Thousand/µL      Basophils Absolute 0 02 Thousands/µL     Ammonia [[id number]]  (Normal) Resulted:  01/19/18 0125 Lab Status:  Final result Specimen:  Blood Updated:  01/19/18 0125     Ammonia 14 umol/L     CBC and differential [[id number]]  (Abnormal) Resulted:  01/19/18 0107    Lab Status:  Final result Specimen:  Blood Updated:  01/19/18 0107     WBC 5 36 Thousand/uL      RBC 3 37 (L) Million/uL      Hemoglobin 11 6 g/dL      Hematocrit 33 1 (L) %      MCV 98 fL      MCH 34 4 (H) pg      MCHC 35 0 g/dL      RDW 13 1 %      MPV 10 1 fL      Platelets 099 Thousands/uL      nRBC 0 /100 WBCs      Neutrophils Relative 53 %      Lymphocytes Relative 27 %      Monocytes Relative 15 (H) %      Eosinophils Relative 4 %      Basophils Relative 1 %      Neutrophils Absolute 2 83 Thousands/µL      Lymphocytes Absolute 1 45 Thousands/µL      Monocytes Absolute 0 80 Thousand/µL      Eosinophils Absolute 0 22 Thousand/µL      Basophils Absolute 0 05 Thousands/µL     POCT pregnancy, urine [[id number]]  (Normal) Resulted:  01/18/18 2213    Lab Status:  Final result Updated:  01/18/18 2213     EXT PREG TEST UR (Ref: Negative) negative    Rapid drug screen, urine [[id number]]  (Normal) Collected:  01/18/18 2104    Lab Status:  Final result Specimen:  Urine from Urine, Clean Catch Updated:  01/18/18 2200     Amph/Meth UR Negative     Barbiturate Ur Negative     Benzodiazepine Urine Negative     Cocaine Urine Negative     Methadone Urine Negative     Opiate Urine Negative     PCP Ur Negative     THC Urine Negative    Narrative:         FOR MEDICAL PURPOSES ONLY  IF CONFIRMATION NEEDED PLEASE CONTACT THE LAB WITHIN 5 DAYS      Drug Screen Cutoff Levels:  AMPHETAMINE/METHAMPHETAMINES  1000 ng/mL  BARBITURATES     200 ng/mL  BENZODIAZEPINES     200 ng/mL  COCAINE      300 ng/mL  METHADONE      300 ng/mL  OPIATES      300 ng/mL  PHENCYCLIDINE     25 ng/mL  THC       50 ng/mL    Comprehensive metabolic panel [[id number]]  (Abnormal) Collected:  01/18/18 2104    Lab Status:  Final result Specimen:  Blood from Arm, Left Updated:  01/18/18 2146     Sodium 135 (L) mmol/L      Potassium 3 3 (L) mmol/L      Chloride 98 (L) mmol/L      CO2 29 mmol/L      Anion Gap 8 mmol/L      BUN 10 mg/dL      Creatinine 0 73 mg/dL      Glucose 82 mg/dL      Calcium 9 4 mg/dL       (H) U/L      ALT 88 (H) U/L      Alkaline Phosphatase 113 U/L      Total Protein 8 2 g/dL      Albumin 4 1 g/dL      Total Bilirubin 2 06 (H) mg/dL      eGFR 104 ml/min/1 73sq m     Narrative:         National Kidney Disease Education Program recommendations are as follows:  GFR calculation is accurate only with a steady state creatinine  Chronic Kidney disease less than 60 ml/min/1 73 sq  meters  Kidney failure less than 15 ml/min/1 73 sq  meters  Magnesium [[id number]]  (Normal) Collected:  01/18/18 2104    Lab Status:  Final result Specimen:  Blood from Arm, Left Updated:  01/18/18 2146     Magnesium 1 7 mg/dL     Phosphorus [[id number]]  (Normal) Collected:  01/18/18 2104    Lab Status:  Final result Specimen:  Blood from Arm, Left Updated:  01/18/18 2146     Phosphorus 3 6 mg/dL     UA w Reflex to Microscopic w Reflex to Culture [[id number]]     Lab Status:  No result Specimen:  Urine     POCT alcohol breath test [[id number]]  (Normal) Resulted:  01/18/18 2135    Lab Status:  Final result Updated:  01/18/18 2136     EXTBreath Alcohol 0 000                 CT head without contrast   Final Result by Jose De Jesus Garcia MD (01/18 2154)      No acute intracranial abnormality           Workstation performed: [id number]         MRI brain w wo contrast    (Results Pending)         Procedures  ECG 12 Lead Documentation  Date/Time: 1/19/2018 2:52 AM  Performed by: Emerita Yancey  Authorized by: Apolonia Baptiste     Indications / Diagnosis:  Withdrawal  Patient location:  ED  Previous ECG:     Previous ECG:  Unavailable  Interpretation:     Interpretation: normal    Rate:     ECG rate:  84    ECG rate assessment: normal    Rhythm:     Rhythm: sinus rhythm    Ectopy:     Ectopy: none    QRS:     QRS axis:  Normal QRS intervals:  Normal  ST segments:     ST segments:  Normal  T waves:     T waves: normal    Other findings:     Other findings: poor R wave progression              Phone Consults  ED Phone Contact    ED Course  ED Course as of Jan 20 0000   Thu Jan 18, 2018   2336   Patient made aware of  results  SLIM paged for admission    Fri Jan 19, 2018   0238 SLIM admit for alcohol withdrawal/ hallucinations                                 MDM  Number of Diagnoses or Management Options  Alcohol withdrawal ([facility] ): new and requires workup  Hallucinations: new and requires workup  Hypokalemia: new and requires workup  Diagnosis management comments: Patient with hallucinations and alcohol withdrawal   Will get labs including electrolytes, EKG, and CT head to rule out intracranial pathology  Will likely admit for further management  Will give benzos, folate, and thiamine for symptoms  Patient reevaluated and feels improved  Patient updated on results of tests and plan of care including admission to hospital for further evaluation of presenting complaint  Patient demonstrates verbal understanding and agrees with plan  Report to Dr Desi Levin  with CAMILA for continuation of patient care          Amount and/or Complexity of Data Reviewed  Clinical lab tests: ordered and reviewed  Tests in the radiology section of CPT®: ordered and reviewed  Tests in the medicine section of CPT®: ordered and reviewed  Discussion of test results with the performing providers: yes  Decide to obtain previous medical records or to obtain history from someone other than the patient: yes  Obtain history from someone other than the patient: yes  Review and summarize past medical records: yes  Discuss the patient with other providers: yes  Independent visualization of images, tracings, or specimens: yes    Patient Progress  Patient progress: improved    CritCare Time    Disposition  Final diagnoses:   Alcohol withdrawal ([facility] )   Hallucinations Hypokalemia     Time reflects when diagnosis was documented in both MDM as applicable and the Disposition within this note     Time User Action Codes Description Comment    1/18/2018 11:46 PM Mireya Magana Add [F10 239] Alcohol withdrawal ([facility] [address] )     1/18/2018 11:46 PM Mireya Magana Add [R44 3] Hallucinations     1/18/2018 11:47 PM Mireya Magana Add [E87 6] Hypokalemia     1/19/2018  5:25 AM Tesha Sharma, Con Nile [F19 239,  R56 9] Withdrawal seizures, with unspecified complication ([facility] )     9/41/4422 10:00 AM Jelly Prabhakar [R44 3] Hallucinations       ED Disposition     ED Disposition Condition Comment    Admit  Case was discussed with CAMILA and the patient's admission status was agreed to be Admission Status: observation status to the service of Dr Tesha Sharma   Follow-up Information    None       There are no discharge medications for this patient  No discharge procedures on file  ED Provider  Attending physically available and evaluated Abhishek Gambino I managed the patient along with the ED Attending      Electronically Signed by         Jennifer Alvarez DO  01/20/18 0000

## 2018-01-19 NOTE — ASSESSMENT & PLAN NOTE
Patient with history of alcoholism and alcohol withdrawal seizures will be admitted to telemetry on an observation basis  Will monitor for DT  Continue Ativan p r n  every 2 hours thiamine folic acid  Started on Librium   Monitor electrolytes and replace as needed  Continue IV fluids antiemetic   on case for discharge planning

## 2018-01-19 NOTE — ASSESSMENT & PLAN NOTE
· Patient has a hx of withdrawal seizures was evaluated by epileptologist Dr Ramez Carrasco in July 2017  · EEG  was normal that time however patient declined antiepileptic drug  · Reports she woke up Sunday with bruises, glasses on the ground and tongue bite wound   · Seizure precautions  · Ativan p r n    · Appreciate neurology evaluation

## 2018-01-19 NOTE — H&P
H&P- Nile Course 1980, 42 y o  female MRN: [id number]    Unit/Bed#: CW2 217-01 Encounter: [id number]    Primary Care Provider: JUAN Chicas   Date and time admitted to hospital: 1/18/2018  8:09 PM        * Alcohol withdrawal [facility])   Assessment & Plan    Patient with history of alcoholism and alcohol withdrawal seizures will be admitted to telemetry on an observation basis  Will monitor for DT  Continue Ativan p r n  every 2 hours thiamine folic acid  Started on Librium   Monitor electrolytes and replace as needed  Continue IV fluids antiemetic   on case for discharge planning        Withdrawal seizures, with unspecified complication [facility])   Assessment & Plan    Patient has a withdrawal seizures was evaluated by epileptologist Elvie Escobar in July 2017  EEG  was normal that time however patient declined  offered antiepileptic drug  Seizure precautions  Ativan p r n  Patient suspecst she might had a seizure episode on Sunday   Will ask for neurology evaluation        Hypokalemia   Assessment & Plan    Secondary to alcohol abuse  Monitor electrolytes, replaced as needed  Check potassium, magnesium and phosphorus levels in a m  VTE Prophylaxis: Pharmacologic VTE Prophylaxis contraindicated due to Low risk  / sequential compression device   Code Status: full  POLST: There is no POLST form on file for this patient (pre-hospital)      Anticipated Length of Stay:  Patient will be admitted on an Observation basis with an anticipated length of stay of  < 2 midnights  Justification for Hospital Stay:  Neurology evaluation    Total Time for Visit, including Counseling / Coordination of Care: 45 minutes  Greater than 50% of this total time spent on direct patient counseling and coordination of care      Chief Complaint:   Alcohol withdrawal possible seizure activity    History of Present Illness:    Nile Cannon is a 42 y o  female with known history of alcohol abuse and withdrawal seizure who presented to the emergency room with alcohol withdrawal symptoms   Patient reports to drink rum every day,  She failed  alcohol rehab  In June 2017 was admitted with mandibular fracture and evaluated by Neurology for seizure activities  According to Dr Be Guadarrama  Notes patient had 2 seizures which could have  occurred  on the basis of alcohol withdrawal   The EEG done on 7/14/2017 was normal, regardless of EEG results  patient was recommended to be on antiepileptic drug at least for 6 months, unfortunately patient declined it to start medication because of the normal EEG and promising to quit alcohol  UDS negative, potassium replaced in the ER, CT of the head reported no acute intracranial abnormality   Patient will be admitted on an observation basis for neurology evaluation      Review of Systems:    Review of Systems   Neurological: Positive for seizures  Psychiatric/Behavioral:        Alcohol withdrawal       Past Medical and Surgical History:     Past Medical History:   Diagnosis Date    Seizures ([facility] [address] )        Past Surgical History:   Procedure Laterality Date    ANTERIOR CRUCIATE LIGAMENT REPAIR      CLOSED REDUCTION MANDIBLE Bilateral 6/26/2016    Procedure: CLOSED REDUCTION MANDIBLE;  Surgeon: Girish Chilel DMD;  Location: [facility];  Service:        Meds/Allergies:    Prior to Admission medications    Not on File     I have reviewed home medications with a medical source (PCP, Pharmacy, other)  Allergies:    Allergies   Allergen Reactions    Sulfa Antibiotics        Social History:     Marital Status: Single   Occupation:  Noncontributory  Patient Pre-hospital Living Situation: home  Patient Pre-hospital Level of Mobility: reg  Patient Pre-hospital Diet Restrictions: reg  Substance Use History:   History   Alcohol Use No     History   Smoking Status    Never Smoker   Smokeless Tobacco    Not on file     History   Drug Use No       Family History:    non-contributory    Physical Exam:     Vitals:   Blood Pressure: 110/68 (01/19/18 0259)  Pulse: 72 (01/19/18 0259)  Temperature: 98 6 °F (37 °C) (01/18/18 1800)  Respirations: 18 (01/19/18 0259)  Weight - Scale: 58 7 kg (129 lb 8 oz) (01/18/18 1800)  SpO2: 99 % (01/19/18 0259)    Physical Exam   Constitutional: She appears well-developed  HENT:   Head: Normocephalic  Eyes: Pupils are equal, round, and reactive to light  Neck: Normal range of motion  Cardiovascular: Regular rhythm  Pulmonary/Chest: No respiratory distress  Abdominal: She exhibits no distension  Musculoskeletal: She exhibits no edema  Neurological: She is alert  Mild bilateral hand tremor   Skin: Skin is warm  Psychiatric: She has a normal mood and affect  Additional Data:     Lab Results: I have personally reviewed pertinent reports  Results from last 7 days  Lab Units 01/19/18  0107   WBC Thousand/uL 5 36   HEMOGLOBIN g/dL 11 6   HEMATOCRIT % 33 1*   PLATELETS Thousands/uL 182   NEUTROS PCT % 53   LYMPHS PCT % 27   MONOS PCT % 15*   EOS PCT % 4       Results from last 7 days  Lab Units 01/18/18  2104   SODIUM mmol/L 135*   POTASSIUM mmol/L 3 3*   CHLORIDE mmol/L 98*   CO2 mmol/L 29   BUN mg/dL 10   CREATININE mg/dL 0 73   CALCIUM mg/dL 9 4   TOTAL PROTEIN g/dL 8 2   BILIRUBIN TOTAL mg/dL 2 06*   ALK PHOS U/L 113   ALT U/L 88*   AST U/L 142*   GLUCOSE RANDOM mg/dL 82           Imaging: I have personally reviewed pertinent reports  CT head without contrast   Final Result by Richard Shi MD (01/18 2154)      No acute intracranial abnormality  Workstation performed: [id number]               geolad / [employer] Records Reviewed: Yes     ** Please Note: This note has been constructed using a voice recognition system   **

## 2018-01-19 NOTE — ED ATTENDING ATTESTATION
Lalitha Bermeo MD, saw and evaluated the patient  I have discussed the patient with the resident/non-physician practitioner and agree with the resident's/non-physician practitioner's findings, Plan of Care, and MDM as documented in the resident's/non-physician practitioner's note, except where noted  All available labs and Radiology studies were reviewed  At this point I agree with the current assessment done in the Emergency Department  I have conducted an independent evaluation of this patient a history and physical is as follows:      Critical Care Time  CritCare Time  OA: 43 y/o f with EtOH w/d sxms  Pt states that she has been drinking heavily 5-6 times/week for over a year  Pt has previously gone to rehab but continues to drink  History of alcohol seizures  Believes she had a seizure Sunday at which time she awoke on the ground without her glasses  + hallucinations, both visual and auditory that began over the past day  Currently feels mildly shaky but no other seizures  NO headache  NO visual changes otherwise  No cp/sob  No n/v  NO SI/HI  Denies other drug use  PE, well developed f in NAD, VSS, NC/AT, MMM, PERRL, no nsytagmus, neck supple/FROM/-midline ttp, RR, lungs CTAB, abd soft, +BS, -r/g, - LE edema/calf ttp, + 2 distal pulses and capillary refill < 2 sec, AAOx3, anxious, GREEN, symmetric face/clear speech, no flap   A/p EtOH w/d, EKG, basic labs, treat accordingly      Procedures

## 2018-01-19 NOTE — ASSESSMENT & PLAN NOTE
· Reports drinking 1/5 liquor over 2 days and a "couple of beers" 4-5 times a week since October b/c she was "bored"   · Last drink Saturday, and reported seizure like episode that was unwitnessed on Sunday   · Continue Ativan p r n, reduce librium - has minimal sx presently   · MV, thiamine, folic acid   · Continue IV fluids antiemetic  · HOST referral

## 2018-01-19 NOTE — CONSULTS
Consultation - Neurology   Uzair George 42 y o  female MRN: [id number]  Unit/Bed#: CW2 217-01 Encounter: [id number]      Assessment/Plan   1  Hallucinations  -Visual and auditory hallucinations x2 days as described below  Likely related to alcohol withdrawal  No hallucinations since last night in ED  -Questionable hypodensity to left temporal area on non contrast CT on 1/18  -MRI w and wo contrast pending    2  History of alcohol withdrawal seizure  -Questionable unwitnessed seizure last Sunday 1/14 as described below  ? Related to alcohol withdrawal vs ? Related to Hypodensity as above  -History of likely alcohol withdrawal seizure in June 2016  EEG at that time normal  No AEDs as stated below  -No EEG needed at this time    3  Alcohol Abuse  -Reports drinking a few beers and 1/4 750ml bottle of rum roughly every other day  -Reports last drink was last Saturday night 7/96  -Continue folic acid and thiamine  -Alcohol cessation education   -Monitor for signs of alcohol withdrawal    History of Present Illness     Reason for Consult / Principal Problem: Alcohol Withdrawal Seizures    HPI: Uzair George is a 42 y o   female with past medical history of alcohol abuse and likely alcohol withdrawal seizure in 6/16 who presents to the ED in [facility] with complaints of hallucinations and possible seizure  CT head negative  UDS negative  Alcohol breath test negative  Patient states she may have had a seizure last Sunday  She states she remembers going to bed aruond 9-9:30 PM  She states she woke up at 11 PM and she was unable to find her glasses, she had bit her tongue, and it felt like she bumped her head  She said she felt tired and maybe a little confused at this time  She states she had her glasses with her michael she went to bed  She states she found her glasses downstairs  She thought she might have had a seizure, but decided to go back to bed  She was home alone when this occurred      She last reports drinking Saturday night where she had a few beers and a 1/4 bottle of 750ml of rum  She states she does not drink every day, more like every other day and above is the normal about she usually drinks  She still states her last drink was Saturday night  Patient states she felt fine Monday and Tuesday  On Wednesday she starting having hallucinations  She states she saw "a cat walking, a dress hanging, and flashing lights " She states she heard voices/noises and music  She also reports telling the voices to "stop" and that "they listened sometimes " She states she went to her parents house because she thought hers was haunted  The auditory hallucinations kept her up that night and she didn't get much sleep  She states the hallucinations lessoned throughout the day on Thursday, only hearing music  She was seen by her PCP who instructed her to go to the ED  Patient has a history of likely alcohol withdrawal seizure in 6/16  2 events of generalized tonic-clinic seizure reported at that time  Was following up with Dr Helga Coats  EEG at that time normal  Patient also stated she was going to quite drinking at that time  Dr Helga Coats wanted to put the patient on AEDs for 6 months, but patient did not want to start any medication  Dr Helga Coats did not push for this considering normal EEG and patient stating she was going to quite drinking  No other episodes of seizure activity since that time  Patient was seen resting comfortable in bed  She was sleeping, but aroused easily to verbal stimuli  She was somewhat confused at first, unsure of where she was, but was easily redirected  She has no current complaints  She states her last hallucination was last night in the ED where she saw "rainfall " Continues to states her last drink was last Saturday  Lani headache, dizziness, vision changes, CP, SOB, N/V, abdominal pain, numbness or weakness      Inpatient consult to Neurology  Consult performed by: Temo Ann ordered by: Archana Umaña          Review of Systems   12 point ROS performed, as stated above, all others negative  Historical Information   Past Medical History:   Diagnosis Date    Seizures [facility])      Past Surgical History:   Procedure Laterality Date    ANTERIOR CRUCIATE LIGAMENT REPAIR      CLOSED REDUCTION MANDIBLE Bilateral 6/26/2016    Procedure: CLOSED REDUCTION MANDIBLE;  Surgeon: Victorina Retana DMD;  Location: [facility];  Service:      Social History   History   Alcohol Use No     History   Drug Use No     History   Smoking Status    Never Smoker   Smokeless Tobacco    Not on file     Family History: History reviewed  No pertinent family history  Review of previous medical records was completed  Meds/Allergies   all current active meds have been reviewed and current meds:   Current Facility-Administered Medications   Medication Dose Route Frequency    acetaminophen (TYLENOL) tablet 650 mg  650 mg Oral Q6H PRN    chlordiazePOXIDE (LIBRIUM) capsule 10 mg  10 mg Oral Q8H [address]    docusate sodium (COLACE) capsule 100 mg  100 mg Oral BID    folic acid (FOLVITE) tablet 1 mg  1 mg Oral Daily    LORazepam (ATIVAN) 2 mg/mL injection 1 mg  1 mg Intravenous Q2H PRN    ondansetron (ZOFRAN) injection 4 mg  4 mg Intravenous Q6H PRN    polyethylene glycol (MIRALAX) packet 17 g  17 g Oral Daily PRN    senna (SENOKOT) tablet 8 6 mg  1 tablet Oral Daily    sodium chloride 0 9 % infusion  100 mL/hr Intravenous Continuous    thiamine (VITAMIN B1) tablet 100 mg  100 mg Oral Daily       Allergies   Allergen Reactions    Sulfa Antibiotics        Objective   Vitals:Blood pressure 117/75, pulse 89, temperature 98 6 °F (37 °C), resp  rate 16, height 5' 4" (1 626 m), weight 58 6 kg (129 lb 3 oz), SpO2 99 %  ,Body mass index is 22 18 kg/m²      Intake/Output Summary (Last 24 hours) at 01/19/18 0916  Last data filed at 01/19/18 0801   Gross per 24 hour   Intake             1340 ml   Output                0 ml Net             1340 ml       Invasive Devices: Invasive Devices     Peripheral Intravenous Line            Peripheral IV 01/18/18 Left Antecubital less than 1 day                Physical Exam   Constitutional: She appears well-developed and well-nourished  No distress  HENT:   Head: Normocephalic and atraumatic  Small area or erythema to right superiolateral forehead where patient states she may have hit head  Eyes: EOM are normal  Pupils are equal, round, and reactive to light  Neck: Normal range of motion  Neck supple  Cardiovascular: Normal rate and regular rhythm  Pulmonary/Chest: Effort normal and breath sounds normal    Abdominal: Soft  Bowel sounds are normal    Neurological: She has normal strength  She has a normal Finger-Nose-Finger Test and a normal Heel to [employer]    Reflex Scores:       Tricep reflexes are 1+ on the right side and 1+ on the left side  Bicep reflexes are 1+ on the right side and 1+ on the left side  Brachioradialis reflexes are 1+ on the right side and 1+ on the left side  Patellar reflexes are 1+ on the right side and 1+ on the left side  Achilles reflexes are 1+ on the right side and 1+ on the left side  Skin: Skin is warm and dry  She is not diaphoretic  Neurologic Exam     Mental Status   Patient seen sleeping, easily arousable to verbal stimuli  Initial somewhat confused stating she was in [place] upon awakening, but easily redirected  Able to tell me history of why she is here clearly, month and year, and [place] is president  Able to do calculations  Speech normal, no slurring  No word finding difficulty  Attention and concentration appropriate  Cranial Nerves     CN II   Visual fields full to confrontation  CN III, IV, VI   Pupils are equal, round, and reactive to light    Extraocular motions are normal    Nystagmus: none   Diplopia: none  Upgaze: normal  Downgaze: normal  Conjugate gaze: present    CN V   Facial sensation intact  CN VII   Facial expression full, symmetric  CN VIII   CN VIII normal      CN IX, X   CN IX normal      CN XI   CN XI normal      CN XII   CN XII normal    Tongue: not atrophic  Fasciculations: absent  Tongue deviation: none    Motor Exam   Muscle bulk: normal  Overall muscle tone: normal    Strength   Strength 5/5 throughout  B/l upper extremity tremor noted at rest throughout exam, heightened with movement  No asterixis  No myoclonus  Sensory Exam   Light touch normal    Vibration normal      Gait, Coordination, and Reflexes     Coordination   Finger to nose coordination: normal  Heel to shin coordination: normal    Reflexes   Right brachioradialis: 1+  Left brachioradialis: 1+  Right biceps: 1+  Left biceps: 1+  Right triceps: 1+  Left triceps: 1+  Right patellar: 1+  Left patellar: 1+  Right achilles: 1+  Left achilles: 1+  Right : 1+  Left : 1+  Right plantar: normal  Left plantar: normal  Gait deferred  Lab Results: I have personally reviewed pertinent reports       Recent Results (from the past 24 hour(s))   Comprehensive metabolic panel    Collection Time: 01/18/18  9:04 PM   Result Value Ref Range    Sodium 135 (L) 136 - 145 mmol/L    Potassium 3 3 (L) 3 5 - 5 3 mmol/L    Chloride 98 (L) 100 - 108 mmol/L    CO2 29 21 - 32 mmol/L    Anion Gap 8 4 - 13 mmol/L    BUN 10 5 - 25 mg/dL    Creatinine 0 73 0 60 - 1 30 mg/dL    Glucose 82 65 - 140 mg/dL    Calcium 9 4 8 3 - 10 1 mg/dL     (H) 5 - 45 U/L    ALT 88 (H) 12 - 78 U/L    Alkaline Phosphatase 113 46 - 116 U/L    Total Protein 8 2 6 4 - 8 2 g/dL    Albumin 4 1 3 5 - 5 0 g/dL    Total Bilirubin 2 06 (H) 0 20 - 1 00 mg/dL    eGFR 104 ml/min/1 73sq m   Magnesium    Collection Time: 01/18/18  9:04 PM   Result Value Ref Range    Magnesium 1 7 1 6 - 2 6 mg/dL   Phosphorus    Collection Time: 01/18/18  9:04 PM   Result Value Ref Range    Phosphorus 3 6 2 7 - 4 5 mg/dL   Rapid drug screen, urine    Collection Time: 01/18/18 9:04 PM   Result Value Ref Range    Amph/Meth UR Negative Negative    Barbiturate Ur Negative Negative    Benzodiazepine Urine Negative Negative    Cocaine Urine Negative Negative    Methadone Urine Negative Negative    Opiate Urine Negative Negative    PCP Ur Negative Negative    THC Urine Negative Negative   POCT alcohol breath test    Collection Time: 01/18/18  9:35 PM   Result Value Ref Range    EXTBreath Alcohol 0 000    POCT pregnancy, urine    Collection Time: 01/18/18 10:13 PM   Result Value Ref Range    EXT PREG TEST UR (Ref: Negative) negative    ECG 12 lead    Collection Time: 01/18/18 11:05 PM   Result Value Ref Range    Ventricular Rate 84 BPM    Atrial Rate 84 BPM    OR Interval 120 ms    QRSD Interval 86 ms    QT Interval 376 ms    QTC Interval 444 ms    P Axis 60 degrees    QRS Axis 58 degrees    T Wave Axis 35 degrees   CBC and differential    Collection Time: 01/19/18  1:07 AM   Result Value Ref Range    WBC 5 36 4 31 - 10 16 Thousand/uL    RBC 3 37 (L) 3 81 - 5 12 Million/uL    Hemoglobin 11 6 11 5 - 15 4 g/dL    Hematocrit 33 1 (L) 34 8 - 46 1 %    MCV 98 82 - 98 fL    MCH 34 4 (H) 26 8 - 34 3 pg    MCHC 35 0 31 4 - 37 4 g/dL    RDW 13 1 11 6 - 15 1 %    MPV 10 1 8 9 - 12 7 fL    Platelets 850 318 - 487 Thousands/uL    nRBC 0 /100 WBCs    Neutrophils Relative 53 43 - 75 %    Lymphocytes Relative 27 14 - 44 %    Monocytes Relative 15 (H) 4 - 12 %    Eosinophils Relative 4 0 - 6 %    Basophils Relative 1 0 - 1 %    Neutrophils Absolute 2 83 1 85 - 7 62 Thousands/µL    Lymphocytes Absolute 1 45 0 60 - 4 47 Thousands/µL    Monocytes Absolute 0 80 0 17 - 1 22 Thousand/µL    Eosinophils Absolute 0 22 0 00 - 0 61 Thousand/µL    Basophils Absolute 0 05 0 00 - 0 10 Thousands/µL   Ammonia    Collection Time: 01/19/18  1:25 AM   Result Value Ref Range    Ammonia 14 11 - 35 umol/L   CBC and differential    Collection Time: 01/19/18  6:54 AM   Result Value Ref Range    WBC 4 90 4 31 - 10 16 Thousand/uL    RBC 3 38 (L) 3 81 - 5 12 Million/uL    Hemoglobin 11 6 11 5 - 15 4 g/dL    Hematocrit 33 4 (L) 34 8 - 46 1 %    MCV 99 (H) 82 - 98 fL    MCH 34 3 26 8 - 34 3 pg    MCHC 34 7 31 4 - 37 4 g/dL    RDW 13 2 11 6 - 15 1 %    MPV 9 9 8 9 - 12 7 fL    Platelets 481 391 - 385 Thousands/uL    nRBC 0 /100 WBCs    Neutrophils Relative 53 43 - 75 %    Lymphocytes Relative 25 14 - 44 %    Monocytes Relative 17 (H) 4 - 12 %    Eosinophils Relative 5 0 - 6 %    Basophils Relative 0 0 - 1 %    Neutrophils Absolute 2 59 1 85 - 7 62 Thousands/µL    Lymphocytes Absolute 1 22 0 60 - 4 47 Thousands/µL    Monocytes Absolute 0 83 0 17 - 1 22 Thousand/µL    Eosinophils Absolute 0 24 0 00 - 0 61 Thousand/µL    Basophils Absolute 0 02 0 00 - 0 10 Thousands/µL   Basic metabolic panel    Collection Time: 01/19/18  6:54 AM   Result Value Ref Range    Sodium 138 136 - 145 mmol/L    Potassium 3 6 3 5 - 5 3 mmol/L    Chloride 106 100 - 108 mmol/L    CO2 25 21 - 32 mmol/L    Anion Gap 7 4 - 13 mmol/L    BUN 7 5 - 25 mg/dL    Creatinine 0 57 (L) 0 60 - 1 30 mg/dL    Glucose 102 65 - 140 mg/dL    Calcium 8 3 8 3 - 10 1 mg/dL    eGFR 117 ml/min/1 73sq m   Magnesium    Collection Time: 01/19/18  6:54 AM   Result Value Ref Range    Magnesium 2 1 1 6 - 2 6 mg/dL   Phosphorus    Collection Time: 01/19/18  6:54 AM   Result Value Ref Range    Phosphorus 2 5 (L) 2 7 - 4 5 mg/dL     Imaging Studies: I have personally reviewed pertinent reports  and I have personally reviewed pertinent films in PACS   CT head wo contrast 1/18/18:   IMPRESSION:   No acute intracranial abnormality  EKG, Pathology, and Other Studies: I have personally reviewed pertinent reports      VTE Prophylaxis: Sequential compression device (Venodyne)

## 2018-01-19 NOTE — PLAN OF CARE
Problem: Potential for Falls  Goal: Patient will remain free of falls  INTERVENTIONS:  - Assess patient frequently for physical needs  -  Identify cognitive and physical deficits and behaviors that affect risk of falls    -  Wellborn fall precautions as indicated by assessment   - Educate patient/family on patient safety including physical limitations  - Instruct patient to call for assistance with activity based on assessment  - Modify environment to reduce risk of injury  - Consider OT/PT consult to assist with strengthening/mobility   Outcome: Progressing      Problem: COPING  Goal: Pt/Family able to verbalize concerns and demonstrate effective coping strategies  INTERVENTIONS:  - Assist patient/family to identify coping skills, available support systems and cultural and spiritual values  - Provide emotional support, including active listening and acknowledgement of concerns of patient and caregivers  - Reduce environmental stimuli, as able  - Provide patient education  - Assess for spiritual pain/suffering and initiate spiritual care, including notification of Pastoral Care or mariel based community as needed  - Assess effectiveness of coping strategies  Outcome: Progressing    Goal: Will report anxiety at manageable levels  INTERVENTIONS:  - Administer medication as ordered  - Teach and encourage coping skills  - Provide emotional support  - Assess patient/family for anxiety and ability to cope  Outcome: Progressing

## 2018-01-19 NOTE — PROGRESS NOTES
POST ADMISSION FOLLOW UP     Progress Note - Iker Good 1980, 42 y o  female MRN: [id number]    Unit/Bed#: CW2 217-01 Encounter: [id number]    Primary Care Provider: JUAN Watters   Date and time admitted to hospital: 1/18/2018  8:09 PM    * Alcohol withdrawal ([facility] [address] )   Assessment & Plan    · Reports drinking 1/5 liquor over 2 days and a "couple of beers" 4-5 times a week since October b/c she was "bored"   · Last drink Saturday, and reported seizure like episode that was unwitnessed on Sunday   · Continue Ativan p r n, reduce librium - has minimal sx presently   · MV, thiamine, folic acid   · Continue IV fluids antiemetic  · HOST referral         Withdrawal seizures, with unspecified complication [facility])   Assessment & Plan    · Patient has a hx of withdrawal seizures was evaluated by epileptologist Dr Nae Moncada in July 2017  · EEG  was normal that time however patient declined antiepileptic drug  · Reports she woke up Sunday with bruises, glasses on the ground and tongue bite wound   · Seizure precautions  · Ativan p r n  · Appreciate neurology evaluation        Hallucinations   Assessment & Plan    · Patient reports visual and auditory hallucinations on Wednesday that have now resolved   · Was seeing a hanging dress, cats, heard noises, people singing to her and lights flashing, thought house was haunted and went to her parents house where hallucinations persisted   · Was evaluated by her PCP yesterday who recommended she come in to the hospital for evaluation and psych admission per patient? I am unable to obtain allscripts records  · Would appreciate a psych consult           VTE Pharmacologic Prophylaxis:   Pharmacologic: low risk, ambulatory   Mechanical VTE Prophylaxis in Place: Yes    Patient Centered Rounds: I have performed bedside rounds with nursing staff today      Discussions with Specialists or Other Care Team Provider: nursing     Education and Discussions with Family / Patient: patient     Time Spent for Care: 30 minutes  More than 50% of total time spent on counseling and coordination of care as described above  Current Length of Stay: 0 day(s)    Current Patient Status: Observation   Certification Statement: The patient will continue to require additional inpatient hospital stay due to neurology and psych evaluation, HOST referral, monitor for sxs withdrawal     Discharge Plan / Estimated Discharge Date: pending  Likely within 24 hours     Code Status: Level 1 - Full Code      Subjective:   Patient seen and examined at bedside  She states her last drink was on sat and that  she thinks she had a seizure because she had bruises, tongue bite wound and her glasses were on the ground  She was having auditory and visual hallucinations on Wednesday and otherwise felt fine mon and tues  She was scared and thought her house was haunted and she went to her parents home where the hallucinations persisted  She states she went to her pcp yesterday and they said she would be admitted to [facility]? Patient states tremors have improved  She was previously sober for 6 months before October when she "got bored" and started drinking again, about 3-4 beers and 1/2 a bottle of rum a day 4-5 times a week  She states her hallucinations have resolved  She is interested in talking with HOST about outpatient resources to stay sober  She reports history of withdrawal seizures but not on AEDs  She states she lives alone and still drives  She denies family history of psychiatric or mental health conditions  No history of schizophrenia, schizoaffective d/c, bipolar d/o  Objective:     Vitals:   Temp (24hrs), Av 3 °F (36 8 °C), Min:97 7 °F (36 5 °C), Max:98 6 °F (37 °C)    HR:  [72-98] 89  Resp:  [16-18] 16  BP: (110-140)/(68-87) 117/75  SpO2:  [96 %-99 %] 99 %  Body mass index is 22 18 kg/m²  Input and Output Summary (last 24 hours):        Intake/Output Summary (Last 24 hours) at 18 0958  Last data filed at 01/19/18 0801   Gross per 24 hour   Intake             1340 ml   Output                0 ml   Net             1340 ml       Physical Exam:     Physical Exam   Constitutional: She is oriented to person, place, and time  She appears well-developed and well-nourished  No distress  HENT:   Head: Normocephalic  Bite wound left lateral aspect of tongue    Eyes: EOM are normal  No scleral icterus  Neck: Normal range of motion  Cardiovascular: Regular rhythm and normal heart sounds  No murmur heard  Slightly tachy   Pulmonary/Chest: Effort normal and breath sounds normal    Abdominal: Soft  Bowel sounds are normal    Musculoskeletal: Normal range of motion  She exhibits no edema  Neurological: She is alert and oriented to person, place, and time  No tremors noted    Skin: Skin is warm and dry  Psychiatric:   Flat affect     Additional Data:     Labs:      Results from last 7 days  Lab Units 01/19/18  0654   WBC Thousand/uL 4 90   HEMOGLOBIN g/dL 11 6   HEMATOCRIT % 33 4*   PLATELETS Thousands/uL 175   NEUTROS PCT % 53   LYMPHS PCT % 25   MONOS PCT % 17*   EOS PCT % 5       Results from last 7 days  Lab Units 01/19/18  0654 01/18/18  2104   SODIUM mmol/L 138 135*   POTASSIUM mmol/L 3 6 3 3*   CHLORIDE mmol/L 106 98*   CO2 mmol/L 25 29   BUN mg/dL 7 10   CREATININE mg/dL 0 57* 0 73   CALCIUM mg/dL 8 3 9 4   TOTAL PROTEIN g/dL  --  8 2   BILIRUBIN TOTAL mg/dL  --  2 06*   ALK PHOS U/L  --  113   ALT U/L  --  88*   AST U/L  --  142*   GLUCOSE RANDOM mg/dL 102 82           * I Have Reviewed All Lab Data Listed Above  * Additional Pertinent Lab Tests Reviewed:  [address] Admission Reviewed    Imaging:    Imaging Reports Reviewed Today Include: ct head   Imaging Personally Reviewed by Myself Includes:  none    Recent Cultures (last 7 days):           Last 24 Hours Medication List:     chlordiazePOXIDE 10 mg Oral Q8H [address]   docusate sodium 100 mg Oral BID   folic acid 1 mg Oral Daily   senna 1 tablet Oral Daily   thiamine 100 mg Oral Daily        Today, Patient Was Seen By: Elenita Jarrell PA-C    ** Please Note: This note has been constructed using a voice recognition system   **

## 2018-01-19 NOTE — CASE MANAGEMENT
Thank you,  [address] [facility] in the [facility] by Raymond Coello for 2017  Network Utilization Review Department  Phone: [phone]; Fax [phone]  ATTENTION: The Network Utilization Review Department is now centralized for our 7 Facilities  Make a note that we have a new phone and fax numbers for our Department  Please call with any questions or concerns to [phone] and carefully follow the prompts so that you are directed to the right person  All voicemails are confidential  Fax any determinations, approvals, denials, and requests for initial or continue stay review clinical to [phone]  Due to HIGH CALL volume, it would be easier if you could please send faxed requests to expedite your requests and in part, help us provide discharge notifications faster     ======================================================================================    Initial Clinical Review  1/18/2018    &     1/19/2018    Admission: Date/Time/Statement: 01/18/2018 @ 2343  Orders Placed This Encounter   Procedures    Place in Observation     Standing Status:   Standing     Number of Occurrences:   1     Order Specific Question:   Admitting Physician     Answer:   Zelalem Magana     Order Specific Question:   Level of Care     Answer:   Med Surg [16]         ED: Date/Time/Mode of Arrival:   ED Arrival Information     Expected Arrival Acuity Means of Arrival Escorted By Service Admission Type    - 1/18/2018 17:47 Urgent Walk-In Self General Medicine Urgent    Arrival Complaint    ETOH WITHDRAWAL           Chief Complaint:   Chief Complaint   Patient presents with    Alcohol Intoxication     pt has binge drinking tendancies and is having with drawl symptoms, tired feeling and some weakness- her last drink was Sunday- pt having some issues with auditory halucinations and seeing things yesterday         History of Illness:   Nile Cannon is a 42 y o  female with known history of alcohol abuse and withdrawal seizure who presented to the emergency room with alcohol withdrawal symptoms   Patient reports to drink rum every day,  She failed  alcohol rehab  In June 2017 was admitted with mandibular fracture and evaluated by Neurology for seizure activities  According to Dr Eze Lackey  Notes patient had 2 seizures which could have  occurred  on the basis of alcohol withdrawal   The EEG done on 7/14/2017 was normal, regardless of EEG results  patient was recommended to be on antiepileptic drug at least for 6 months, unfortunately patient declined it to start medication because of the normal EEG and promising to quit alcohol  UDS negative, potassium replaced in the ER, CT of the head reported no acute intracranial abnormality     Patient will be admitted on an observation basis for neurology evaluation    ED Vital Signs:   ED Triage Vitals   Temperature Pulse Respirations Blood Pressure SpO2   01/18/18 1800 01/18/18 1800 01/18/18 1800 01/18/18 1800 01/18/18 1800   98 6 °F (37 °C) 96 18 140/82 98 %      Temp src Heart Rate Source Patient Position - Orthostatic VS BP Location FiO2 (%)   -- 01/18/18 2107 01/18/18 2107 01/18/18 2107 --    Monitor Lying Right arm       Pain Score       01/18/18 1800       No Pain        Wt Readings from Last 1 Encounters:   01/19/18 58 6 kg (129 lb 3 oz)     Abnormal Labs/Diagnostic Test Results:   WBC Thousand/uL 5 36   HEMOGLOBIN g/dL 11 6   HEMATOCRIT % 33 1*   PLATELETS Thousands/uL 182     SODIUM mmol/L 135*   POTASSIUM mmol/L 3 3*   CHLORIDE mmol/L 98*   CO2 mmol/L 29   BUN mg/dL 10   CREATININE mg/dL 0 73   CALCIUM mg/dL 9 4   TOTAL PROTEIN g/dL 8 2   BILIRUBIN TOTAL mg/dL 2 06*   ALK PHOS U/L 113   ALT U/L 88*   AST U/L 142*   GLUCOSE RANDOM mg/dL 82     Ammonia 14 11 - 35 umol/L     Amph/Meth UR Negative Negative   Barbiturate Ur Negative Negative   Benzodiazepine Urine Negative Negative   Cocaine Urine Negative Negative   Methadone Urine Negative Negative   Opiate Urine Negative Negative   PCP Ur Negative Negative   THC Urine Negative Negative     EXTBreath Alcohol 0 000      EC  NSR    CT head:  No acute intracranial abnormality  ED Treatment:   Medication Administration from 2018 1526 to 2018 0442    Date/Time Order Dose Route Action Action by Comments   2018 210 sodium chloride 0 9 % bolus 1,000 mL 1,000 mL Intravenous Given Natividad Jc RN    2018 potassium chloride (K-DUR,KLOR-CON) CR tablet 40 mEq 40 mEq Oral Given Natividad Jc RN    2018 magnesium sulfate 2 g/50 mL IVPB (premix) 2 g 2 g Intravenous Given Natividad Jc RN    2018 2303 LORazepam (ATIVAN) tablet 0 5 mg 0 5 mg Oral Given Natividad Jc RN     4502 folic acid 1 mg, thiamine (VITAMIN B1) 100 mg in sodium chloride 0 9 % 50 mL IVPB   Intravenous Given Natividad Jc RN    2018 0113 chlordiazePOXIDE (LIBRIUM) capsule 10 mg 10 mg Oral Given Natividad Jc RN    2018 0057 sodium chloride 0 9 % infusion 100 mL/hr Intravenous Given Natividad Jc RN           Past Medical/Surgical History:    Active Ambulatory Problems     Diagnosis Date Noted    Closed fracture of mandible ([facility]) 2016     Past Medical History:   Diagnosis Date    Seizures ([facility])        Admitting Diagnosis: Alcohol withdrawal ([facility]) [F10 239]  Hallucinations [R44 3]  Hypokalemia [E87 6]  Alcohol intoxication ([facility]) [F10 929]    Age/Sex: 42 y o  female    Assessment/Plan:          * Alcohol withdrawal ([facility])   Assessment & Plan     Patient with history of alcoholism and alcohol withdrawal seizures will be admitted to telemetry on an observation basis  Will monitor for DT  Continue Ativan p r n  every 2 hours thiamine folic acid  Started on Librium   Monitor electrolytes and replace as needed  Continue IV fluids antiemetic   on case for discharge planning       Withdrawal seizures, with unspecified complication [facility])   Assessment & Plan     Patient has a withdrawal seizures was evaluated by epileptologist Doug Ahumada in July 2017  EEG  was normal that time however patient declined  offered antiepileptic drug  Seizure precautions  Ativan p r n  Patient suspecst she might had a seizure episode on Sunday   Will ask for neurology evaluation       Hypokalemia   Assessment & Plan     Secondary to alcohol abuse  Monitor electrolytes, replaced as needed  Check potassium, magnesium and phosphorus levels in a m              VTE Prophylaxis: Pharmacologic VTE Prophylaxis contraindicated due to Low risk  / sequential compression device   Code Status: full    Anticipated Length of Stay:  Patient will be admitted on an Observation basis with an anticipated length of stay of  < 2 midnights  Justification for Hospital Stay:  Neurology evaluation         Admission Orders:  Scheduled Meds:   chlordiazePOXIDE 10 mg Oral Q8H [facility]   docusate sodium 100 mg Oral BID   folic acid 1 mg Oral Daily   senna 1 tablet Oral Daily   thiamine 100 mg Oral Daily     Continuous Infusions:   sodium chloride 100 mL/hr Last Rate: 100 mL/hr (01/19/18 0057)     PRN Meds:   acetaminophen    LORazepam    ondansetron    polyethylene glycol    =============================================================================    Continued Stay Review    Date: 01/19/2018    Vital Signs: /75   Pulse 89   Temp 98 6 °F (37 °C)   Resp 16   Ht 5' 4" (1 626 m)   Wt 58 6 kg (129 lb 3 oz)   SpO2 99%   BMI 22 18 kg/m²     Medications:   Scheduled Meds:   chlordiazePOXIDE 10 mg Oral Q8H [facility]   docusate sodium 100 mg Oral BID   folic acid 1 mg Oral Daily   senna 1 tablet Oral Daily   thiamine 100 mg Oral Daily     Continuous Infusions:   sodium chloride 100 mL/hr Last Rate: 100 mL/hr (01/19/18 0057)     PRN Meds:   acetaminophen    LORazepam    ondansetron    polyethylene glycol    Abnormal Labs/Diagnostic Results:     Age/Sex: 42 y o  female     Assessment/Plan:   * Alcohol withdrawal ([facility] [address] )   Assessment & Plan     · Reports drinking 1/5 liquor over 2 days and a "couple of beers" 4-5 times a week since October b/c she was "bored"   · Last drink Saturday, and reported seizure like episode that was unwitnessed on Sunday   · Continue Ativan p r n, reduce librium - has minimal sx presently   · MV, thiamine, folic acid   · Continue IV fluids antiemetic  · HOST referral        Withdrawal seizures, with unspecified complication [facility])   Assessment & Plan     · Patient has a hx of withdrawal seizures was evaluated by epileptologist Dr Eusebio Choudhary in July 2017  · EEG  was normal that time however patient declined antiepileptic drug  · Reports she woke up Sunday with bruises, glasses on the ground and tongue bite wound   · Seizure precautions  · Ativan p r n  · Appreciate neurology evaluation       Hallucinations   Assessment & Plan     · Patient reports visual and auditory hallucinations on Wednesday that have now resolved   · Was seeing a hanging dress, cats, heard noises, people singing to her and lights flashing, thought house was haunted and went to her parents house where hallucinations persisted   · Was evaluated by her PCP yesterday who recommended she come in to the hospital for evaluation and psych admission per patient? I am unable to obtain allscripts records  · Would appreciate a psych consult           VTE Pharmacologic Prophylaxis:   Pharmacologic: low risk, ambulatory   Mechanical VTE Prophylaxis in Place: Yes     Current Length of Stay: 0 day(s)     Current Patient Status: Observation   Certification Statement: The patient will continue to require additional inpatient hospital stay due to neurology and psych evaluation, HOST referral, monitor for sxs withdrawal      Discharge Plan / Estimated Discharge Date: pending   Likely within 24 hours

## 2018-01-19 NOTE — ASSESSMENT & PLAN NOTE
· Patient reports visual and auditory hallucinations on Wednesday that have now resolved   · Was seeing a hanging dress, cats, heard noises, people singing to her and lights flashing, thought house was haunted and went to her parents house where hallucinations persisted   · Was evaluated by her PCP yesterday who recommended she come in to the hospital for evaluation and psych admission per patient? I am unable to obtain allscripts records     · Would appreciate a psych consult

## 2018-01-19 NOTE — PROCEDURES
ELECTROENCEPHALOGRAM (EEG)      Patient Name:  Deana Sahu  MRN: [id number]   :  1980 File #: Yudelka Indra    Age: 42 y o  Encounter #: [id number]   Date performed: 2018            Report date: 2018          Study type: Routine EEG    ICD 10 diagnosis: Spells/Fit NOS R56 9    Start time: 14:14 End time: 14:42     -------------------------------------------------------------------------------------------------------------------   Patient History: This recording was observed in a 42 y o  female with prior alcohol withdrawal seizures to evaluate for risk of epilepsy  Medications include:   chlordiazePOXIDE 10 mg Oral Q8H [address]   docusate sodium 100 mg Oral BID   folic acid 1 mg Oral Daily   senna 1 tablet Oral Daily   thiamine 100 mg Oral Daily       -------------------------------------------------------------------------------------------------------------------   Description of Procedure:  · 32 channel digital recording with electrodes placed according to the International 10-20 system with additional T1/T2 electrodes, EOG, EKG, and simultaneous video  The recording was technically satisfactory  -------------------------------------------------------------------------------------------------------------------   EEG Description:    The recording was performed with the patient awake, drowsy, and asleep  She was fully oriented  During wakefulness, there were long runs of well regulated, low amplitude, posteriorly dominant, symmetric 11-13 cps alpha rhythm that attenuated with eye opening  There were symmetric low amplitude, frontally dominant beta activities  With drowsiness, alpha activity attenuated and was replaced by diffusely distributed theta activities   With sleep, symmetric vertex sharp waves, K complexes and sleep spindles were present      -------------------------------------------------------------------------------------------------------------------   Activation Procedures:  Hyperventilation was not performed  Stepped photic stimulation between 1-20 cps was performed and induced symmetric photic driving  Other findings: The single lead ECG demonstrated normal sinus rhythm    -------------------------------------------------------------------------------------------------------------------   EEG Interpretation:   This Routine EEG recorded during wakefulness, drowsiness, and sleep is normal     Tucker Tavares MD   [facility]

## 2018-01-20 VITALS
DIASTOLIC BLOOD PRESSURE: 70 MMHG | RESPIRATION RATE: 18 BRPM | WEIGHT: 129.19 LBS | HEIGHT: 64 IN | HEART RATE: 103 BPM | OXYGEN SATURATION: 95 % | TEMPERATURE: 97.7 F | SYSTOLIC BLOOD PRESSURE: 107 MMHG | BODY MASS INDEX: 22.06 KG/M2

## 2018-01-20 PROBLEM — F19.939 WITHDRAWAL SEIZURES, WITH UNSPECIFIED COMPLICATION (HCC): Status: RESOLVED | Noted: 2018-01-19 | Resolved: 2018-01-20

## 2018-01-20 PROBLEM — R56.9 WITHDRAWAL SEIZURES, WITH UNSPECIFIED COMPLICATION (HCC): Status: RESOLVED | Noted: 2018-01-19 | Resolved: 2018-01-20

## 2018-01-20 PROBLEM — F10.939 ALCOHOL WITHDRAWAL (HCC): Status: RESOLVED | Noted: 2018-01-19 | Resolved: 2018-01-20

## 2018-01-20 PROBLEM — R44.3 HALLUCINATIONS: Status: RESOLVED | Noted: 2018-01-19 | Resolved: 2018-01-20

## 2018-01-20 PROBLEM — F10.239 ALCOHOL WITHDRAWAL (HCC): Status: RESOLVED | Noted: 2018-01-19 | Resolved: 2018-01-20

## 2018-01-20 PROBLEM — E87.6 HYPOKALEMIA: Status: RESOLVED | Noted: 2018-01-19 | Resolved: 2018-01-20

## 2018-01-20 PROBLEM — F19.239 WITHDRAWAL SEIZURES, WITH UNSPECIFIED COMPLICATION (HCC): Status: RESOLVED | Noted: 2018-01-19 | Resolved: 2018-01-20

## 2018-01-20 PROBLEM — F10.10 ETOH ABUSE: Status: ACTIVE | Noted: 2018-01-20

## 2018-01-20 RX ORDER — MULTIVITAMIN
1 TABLET ORAL DAILY
Refills: 0
Start: 2018-01-20 | End: 2021-02-19 | Stop reason: ALTCHOICE

## 2018-01-20 RX ORDER — LANOLIN ALCOHOL/MO/W.PET/CERES
100 CREAM (GRAM) TOPICAL DAILY
Refills: 0
Start: 2018-01-21 | End: 2021-02-19 | Stop reason: ALTCHOICE

## 2018-01-20 RX ORDER — FOLIC ACID 1 MG/1
1 TABLET ORAL DAILY
Qty: 30 TABLET | Refills: 0
Start: 2018-01-21 | End: 2021-02-19 | Stop reason: ALTCHOICE

## 2018-01-20 RX ADMIN — Medication 100 MG: at 09:37

## 2018-01-20 RX ADMIN — FOLIC ACID 1 MG: 1 TABLET ORAL at 09:37

## 2018-01-20 RX ADMIN — CHLORDIAZEPOXIDE HYDROCHLORIDE 10 MG: 10 CAPSULE ORAL at 05:52

## 2018-01-20 NOTE — PROGRESS NOTES
Assessment   1  Alcoholism with psychosis with hallucinations (291 3,303 90) (F10 951)    Plan   A/P     1 acute alcohol withdrawal: Pt agrees to to to the ER and the  ER at [place] was notified      Chief Complaint   pt  presents in the office today due to having hallucinations   due - 03/202017      History of Present Illness   HPI: Here today with mom with concern regarding visual and auditory hallucinations  that she has slipped up on her alcohol use and has started to drink again after months of abstinence  that she has been drinking several times a week and is drinking rum  Last time she had anything to drink was on 1/13  the next day she was really disoriented  could not recall removing her glasses and could no find them  had bitten her tongue and is concerned that she may have had a seizure ( has history of alcoholic seizures)   through out the next couple days has had visual and auditory hallucinations  Seeing ghosts and hearing noises into her parents home because she was becoming frightened  has not been able to sleep for the past 3 nights because this  Review of Systems        Constitutional: No fever, no chills, feels well, no tiredness, no recent weight gain or loss  Cardiovascular: no complaints of slow or fast heart rate, no chest pain, no palpitations, no leg claudication or lower extremity edema  Respiratory: no complaints of shortness of breath, no wheezing, no dyspnea on exertion, no orthopnea or PND  Integumentary: no complaints of skin rash or lesion, no itching or dry skin, no skin wounds  Neurological: as noted in HPI  Active Problems   1  Alcohol related seizure (780 39) (R56 9)   2  Alcoholism (303 90) (F10 20)   3  Alcoholism in remission (303 93) (F10 21)   4  Annual physical exam (V70 0) (Z00 00)   5  ASCUS favor benign (796 9)   6  Blood tests for routine general physical examination (V72 62) (Z00 00)   7   Dysfunctional uterine bleeding (626 8) (N93 8) 8  Encounter for routine gynecological examination (V72 31) (Z01 419)   9  HPV (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 4) (B97 7)   10  Need for prophylactic vaccination and inoculation against influenza (V04 81) (Z23)   11  Need for vaccination for DTaP (V06 1) (Z23)   12  Other insomnia (780 52) (G47 09)   13  Screening for HPV (human papillomavirus) (V73 81) (Z11 51)   14  Tuberculosis screening (V74 1) (Z11 1)    Past Medical History   1  History of Bruising, spontaneous (782 7) (R23 3)   2  History of Endometrial polyp (621 0) (N84 0)   3  History of Mastodynia (611 71) (N64 4)   4  History of Nulliparity (V61 8)   5  History of Prolonged menstruation (626 2) (N92 1)   6  History of Well adult on routine health check (V70 0) (Z00 00)   7  History of [place] teeth extracted (525 50,525 10) (K80 80)    Family History   Mother    1  Family history of Family Health Status Of Mother - Good   2  Denied: Family history of drug abuse   3  Denied: Family history of Mental health problem  Father    4  Denied: Family history of drug abuse   5  Family history of Hyperlipidemia   6  Family history of Hypertension (V17 49)   7  Denied: Family history of Mental health problem  Maternal Grandmother    8  Family history of Osteoporosis (V17 81)   9  Family history of Tuberculosis  Paternal Grandmother    8  Family history of Alzheimer Disease  Maternal Grandfather    6  Family history of Stroke Syndrome (V17 1)  Paternal Grandfather    15  Family history of Alzheimer Disease  Family History Reviewed: The family history was reviewed and updated today  Social History    · Daily caffeinated coffee consumption   · Denied: History of Drug Use   · Exercises moderately 3 or more times a week   · Former consumption of alcohol (V11 3) (Z87 898)   · Never a smoker   · Occasional caffeine consumption   · Denied: History of Uses drugs daily  The social history was reviewed and updated today   The social history was reviewed and is unchanged  Surgical History   1  History of Closed Treatment Of Mandibular Fracture On The Left   2  History of Colposcopy Cervix With Biopsy(S) With Endocervical Curettage   3  History of Dilation And Curettage   4  History of Inguinal Hernia Repair   5  History of Knee Arthroscopy (Therapeutic)   6  History of Nose Surgery    Current Meds    1  No Reported Medications Recorded    Allergies   1  Sulfa Drugs  2  No Known Environmental Allergies   3  No Known Food Allergies    Vitals    Recorded: [id number] 02:50PM   Heart Rate 70   Respiration 12   Systolic 563   Diastolic 80   Height 5 ft 4 in   Weight 129 lb    BMI Calculated 22 14   BSA Calculated 1 62     Physical Exam        Constitutional      General appearance: No acute distress, well appearing and well nourished  Pulmonary      Auscultation of lungs: Clear to auscultation  Cardiovascular      Auscultation of heart: Normal rate and rhythm, normal S1 and S2, without murmurs  Lymphatic      Palpation of lymph nodes in neck: No lymphadenopathy  Skin      Skin and subcutaneous tissue: Normal without rashes or lesions  Psychiatric      Orientation to person, place, and time: Normal        Mood and affect: Normal           Results/Data   PHQ-2 Adult Depression Screening [id number] 02:52PM User, s      Test Name Result Flag Reference   PHQ-2 Adult Depression Score 0     Over the last two weeks, how often have you been bothered by any of the following problems?      Little interest or pleasure in doing things: Not at all - 0     Feeling down, depressed, or hopeless: Not at all - 0   PHQ-2 Adult Depression Screening Negative          Signatures    Electronically signed by : JUAN Cazares; Jan 18 2018  3:27PM EST                       (Author)     Electronically signed by : Sebastian Greenberg DO; Jan 19 2018 10:53AM EST

## 2018-01-20 NOTE — SOCIAL WORK
CM received a call back from Dhruv Morillo [facility], reporting she will follow up with the Pt at home, as she is able to get out to assess the Pt until after 5pm today, and the Pt isn't willing to stay until then  RASHAAD has made the Pt and RN aware

## 2018-01-20 NOTE — DISCHARGE SUMMARY
Discharge Summary - [facility]    Patient Information: Nikita Black 42 y o  female MRN: [id number]  Unit/Bed#: CW2 217-03 Encounter: [id number]    Discharging Physician / Practitioner: Kyra Og DO  PCP: Opal Berry  Admission Date: 1/18/2018  Discharge Date: 01/20/18    Reason for Admission: ETOH withdrawal, hallucination    Discharge Diagnoses:     Principal Problem:    ETOH abuse  Resolved Problems:    Alcohol withdrawal ([facility] )    Withdrawal seizures, with unspecified complication ([facility] )    Hypokalemia    Hallucinations    HPI: Nikita Black is a 42 y o  female with known history of alcohol abuse and withdrawal seizure who presented to the emergency room with alcohol withdrawal symptoms   Patient reports to drink rum every day,  She failed  alcohol rehab  In June 2017 was admitted with mandibular fracture and evaluated by Neurology for seizure activities  According to Dr Arlin Jeffries  Notes patient had 2 seizures which could have  occurred  on the basis of alcohol withdrawal   The EEG done on 7/14/2017 was normal, regardless of EEG results  patient was recommended to be on antiepileptic drug at least for 6 months, unfortunately patient declined it to start medication because of the normal EEG and promising to quit alcohol  UDS negative, potassium replaced in the ER, CT of the head reported no acute intracranial abnormality     Patient will be admitted on an observation basis for neurology evaluation    Consultations During Hospital Stay:  · Neurology  · Behavioral health    Procedures Performed:     · CT head  · MRI brain w and w/o contrast  · EEG    Significant Findings:     · Refer to hospital course    Incidental Findings:   ·     Test Results Pending at Discharge (will require follow up):   ·      Outpatient Tests Requested:  ·     Complications:      Hospital Course:     # Hallucination, resolved - secondary to etoh w/d  - seen by psych; no further intervention    # ETOH abuse; withdrawal - likely etiology of above; ? If had withdrawal sz was unwitnessed but per pt states she work with bruises  - Seen by neurology  - s/p EEG - normal  - presenting CT head with ? Left hypodensity thus proceeded with MRI w and w/o contrast was personally reviewed by neurology and w/ acute findings  Finding on ct head likely an artifact  - From neurology standpoint no further intervention, no need to started on AED  - Pt was placed on alcohol w/d protocol and and is clinically improved; librium tapered down  - Pt w/o any signs of etoh w/d   - Cont on MVI/folic acid/thiamine  - Pt educated on etoh cessation  She is interested in outpatient sobriety programs which I have informed ; [facility] referral and will speak with patient and provide a resource of other programs    Disp: D/c to home  PLan of care discussed with her at length  Condition at Discharge: stable     Discharge Day Visit / Exam:     Subjective:  Feels well, no recurrence of hallucinations  No tremors  Vitals: Blood Pressure: 107/70 (01/20/18 0715)  Pulse: 103 (01/20/18 0715)  Temperature: 97 7 °F (36 5 °C) (01/20/18 0715)  Temp Source: Oral (01/20/18 0715)  Respirations: 18 (01/20/18 0715)  Height: 5' 4" (162 6 cm) (01/19/18 0445)  Weight - Scale: 58 6 kg (129 lb 3 oz) (01/19/18 0445)  SpO2: 95 % (01/20/18 0715)  Exam:   Physical Exam   Constitutional: She is oriented to person, place, and time  She appears well-developed and well-nourished  Neck: Neck supple  Cardiovascular: Normal rate, normal heart sounds and intact distal pulses  Exam reveals no gallop and no friction rub  No murmur heard  Pulmonary/Chest: Effort normal and breath sounds normal  No respiratory distress  She has no wheezes  She has no rales  She exhibits no tenderness  Abdominal: Soft  Bowel sounds are normal  She exhibits no distension and no mass  There is no tenderness  There is no rebound and no guarding     Musculoskeletal: Normal range of motion  She exhibits no edema, tenderness or deformity  Neurological: She is alert and oriented to person, place, and time  She has normal reflexes  She displays normal reflexes  No cranial nerve deficit  She exhibits normal muscle tone  Coordination normal    N o tremors   Skin: Skin is warm and dry  No rash noted  No erythema  No pallor  Psychiatric: She has a normal mood and affect  Her behavior is normal  Judgment and thought content normal        Discharge instructions/Information to patient and family:   See after visit summary for information provided to patient and family  Provisions for Follow-Up Care:  See after visit summary for information related to follow-up care and any pertinent home health orders  Disposition:     Home    For Discharges to [facility] SNF:   · Not Applicable to this Patient - Not Applicable to this Patient    Planned Readmission: no     Discharge Statement:  I spent > 30 minutes discharging the patient  This time was spent on the day of discharge  I had direct contact with the patient on the day of discharge  Greater than 50% of the total time was spent examining patient, answering all patient questions, arranging and discussing plan of care with patient as well as directly providing post-discharge instructions  Additional time then spent on discharge activities  Discharge Medications:  See after visit summary for reconciled discharge medications provided to patient and family  ** Please Note: Dragon 360 Dictation voice to text software may have been used in the creation of this document   **

## 2018-01-20 NOTE — DISCHARGE INSTRUCTIONS
Alcohol Use Disorder   WHAT YOU NEED TO KNOW:   Alcohol use disorder (AUD) is problem drinking  AUD includes alcohol abuse and alcohol dependency  DISCHARGE INSTRUCTIONS:   Seek care immediately if:   · Your heart is beating faster than usual     · You have hallucinations  · You cannot remember what happens while you are drinking  · You have seizures  Contact your healthcare provider if:   · You are anxious and have nausea  · Your hands are shaky and you are sweating heavily  · You have questions or concerns about your condition or care  Follow up with your healthcare provider as directed:  Do not try to stop drinking on your own  Your healthcare provider may need to help you withdraw from alcohol safely  He may need to admit you to the hospital  You may also need any of the following treatments:  · Medicines to decrease your craving for alcohol    · Support groups such as Alcoholics Anonymous     · Therapy from a psychiatrist or psychologist    · Admission to an inpatient facility for treatment for severe AUD  For support and more information:   · Substance Abuse and [address] , [address] [place]  Web Address: https://B[employer]Tree/  · Alcoholics Anonymous  Web Address: http://[employer]/  © 2017 [address] [employer] Information is for End User's use only and may not be sold, redistributed or otherwise used for commercial purposes  All illustrations and images included in CareNotes® are the copyrighted property of A D A M , Inc  or Raymond Coello  The above information is an  only  It is not intended as medical advice for individual conditions or treatments  Talk to your doctor, nurse or pharmacist before following any medical regimen to see if it is safe and effective for you        Alcohol Withdrawal   WHAT YOU NEED TO KNOW:   Alcohol withdrawal is a group of symptoms that occur when you drink alcohol daily and suddenly stop drinking  It can begin within 5 hours of your last drink and gets worse over 2 to 3 days  Withdrawal may also happen if you suddenly reduce the amount of alcohol that you normally drink  DISCHARGE INSTRUCTIONS:   Call 911 for any of the following:   · You feel like you want to harm yourself or others  Seek care immediately if:   · You have sudden chest pain or trouble breathing  · Your breathing or heartbeat is faster than usual     · You pass out or think you had a seizure  · You are confused, hallucinating, or extremely agitated  · You cannot stop vomiting, or you vomit blood  · You are shaking and it does not get better after you take your medicine  Contact your healthcare provider if:   · You keep drinking to avoid alcohol withdrawal symptoms  · You need help to stop drinking alcohol  · You have trouble with work, relationships, or school because you drink too much alcohol  · You get into fights because of alcohol  · You have questions or concerns about your condition or care  Medicines:   · Medicines  may be given to calm you and help manage your symptoms  Vitamin supplements, such as thiamine, may be recommended because high alcohol intake can keep your body from absorbing enough vitamins from food  · Take your medicine as directed  Contact your healthcare provider if you think your medicine is not helping or if you have side effects  Tell him or her if you are allergic to any medicine  Keep a list of the medicines, vitamins, and herbs you take  Include the amounts, and when and why you take them  Bring the list or the pill bottles to follow-up visits  Carry your medicine list with you in case of an emergency  Have someone stay with you during withdrawal:  This person should help you take your medicine and keep you in a calm, quiet environment  He should also watch your symptoms and know what to do if your symptoms get worse     Follow up with your healthcare provider within 1 day:  Write down your questions so you remember to ask them during your visits  Learn to stop drinking alcohol safely:  Work with your healthcare provider to develop a plan for you to stop drinking safely  A sudden stop or change can be life-threatening  For support and more information:   · Alcoholics Anonymous  Web Address: http://www gifford info/  © 2017 [address] Information is for End User's use only and may not be sold, redistributed or otherwise used for commercial purposes  All illustrations and images included in CareNotes® are the copyrighted property of A D A M , Inc  or Raymond Coello  The above information is an  only  It is not intended as medical advice for individual conditions or treatments  Talk to your doctor, nurse or pharmacist before following any medical regimen to see if it is safe and effective for you  Abuse of Alcohol   WHAT YOU NEED TO KNOW:   · Alcohol abuse is unhealthy drinking behavior  You may drink too much at one time once a week, or continue to drink too much daily  You continue to drink even though it causes problems  The problems can be alcohol related legal problems, or problems with work or relationships with family  · If you drink too much at one time, you are binge drinking  Binge drinking is when you have a large amount of alcohol in a short time  Your blood alcohol concentrations (JUSTYNA) goes above 0 08 g/dLlevel during binge drinking  For men, this usually happens with more than 4 drinks in 2 hours  For women, it is more than 3 drinks in 2 hours  A drink is 12 ounces of beer, 4 ounces of wine, or 1½ ounces of liquor  DISCHARGE INSTRUCTIONS:   Call 911 for the following:   · You have sudden chest pain or trouble breathing  · You have a seizure or have shaking or trembling  · You were in an accident because of alcohol    Seek care immediately if:   · You want to harm yourself or others  · You have hallucinations (you see or hear things that are not real)  · You cannot stop vomiting or you vomit blood  Contact your healthcare provider if:   · You need help to stop drinking alcohol  · You have questions or concerns about your condition or care  Medicines:   · Vitamin supplements  may be given to treat low vitamin levels  Alcohol can make it hard for your body to absorb enough vitamins such as B1  Vitamin supplements may also be given to prevent alcohol related brain damage  · Take your medicine as directed  Contact your healthcare provider if you think your medicine is not helping or if you have side effects  Tell him or her if you are allergic to any medicine  Keep a list of the medicines, vitamins, and herbs you take  Include the amounts, and when and why you take them  Bring the list or the pill bottles to follow-up visits  Carry your medicine list with you in case of an emergency  Treatments or therapies you may need:   · Detoxification (detox) and withdrawal  is a program that helps you to safely get alcohol out of your body  Detox can also help get rid of the physical need to drink  Healthcare providers monitor the physical symptoms of withdrawal  They may give you medicines to help decrease nausea, dehydration, and seizures  Healthcare providers will also monitor your blood pressure, heart and breathing rates, and your temperature  Symptoms of anxiety, depression, and suicidal thoughts are also monitored and managed during detox  Healthcare providers may give you medicines for these symptoms and therapy sessions will be available to you  Detox is usually done at a detox center or in a hospital  Healthcare providers do not recommend that you try to detox at home or by yourself  Withdrawal symptoms may become life-threatening  The center can help you find 12 step programs or an individual therapist to help with emotional support after detox      · Inpatient and outpatient treatment  focus on your personal needs to help you stop drinking  Treatment helps you understand the reasons you abuse alcohol  Counselors and therapists provide you with support and help you find ways to cope instead of drinking  You may need inpatient treatment to provide a controlled environment  You may need outpatient treatment after your inpatient treatment is complete  · Alcohol aversion therapy  takes away the desire to drink by causing a negative reaction when you drink  Healthcare providers may give you medicines that cause nausea and vomiting when you drink alcohol  They may instead give you a medicine that decreases your urge to drink alcohol  These medicines are used to help you stop drinking or reduce the amount you drink  They can also help you avoid relapse  Follow up with your healthcare provider as directed:  Write down your questions so you remember to ask them during your visits  Avoid alcohol:  You should stop drinking entirely  Alcohol can damage your brain, heart, and liver  It also increases your risk for injury, high blood pressure, and certain types of cancer  Alcohol is dangerous when you combine it with certain medicines  Do not drive if you drink alcohol:  Make sure someone who has not been drinking can help you get home  Get support:  Most people need support to stop drinking alcohol  Mental health providers, support groups, rehabilitation centers, and your healthcare provider can provide support  For more information:   · Alcoholics Anonymous  Web Address: http://www gifford Flocations/  · Substance Abuse and [address] , [address] [place]  Web Address: https://[employer]/  © 2017 [address] Information is for End User's use only and may not be sold, redistributed or otherwise used for commercial purposes   All illustrations and images included in CareNotes® are the copyrighted property of [employer] A [employer] , Inc  or [address]  The above information is an  only  It is not intended as medical advice for individual conditions or treatments  Talk to your doctor, nurse or pharmacist before following any medical regimen to see if it is safe and effective for you

## 2018-01-20 NOTE — CASE MANAGEMENT
Continued Stay Review    Thank you,  [facility] in the [facility] by Raymond Coello for 2017  Network Utilization Review Department  Phone: [phone]; Fax [phone]  ATTENTION: The Network Utilization Review Department is now centralized for our 7 Facilities  Make a note that we have a new phone and fax numbers for our Department  Please call with any questions or concerns to [phone] and carefully follow the prompts so that you are directed to the right person  All voicemails are confidential  Fax any determinations, approvals, denials, and requests for initial or continue stay review clinical to [phone]  Due to HIGH CALL volume, it would be easier if you could please send faxed requests to expedite your requests and in part, help us provide discharge notifications faster  Date: 1/20/2018- Observation status since 1/18 @ 23:43    Vital Signs: /70 (BP Location: Left arm)   Pulse 103   Temp 97 7 °F (36 5 °C) (Oral)   Resp 18   Ht 5' 4" (1 626 m)   Wt 58 6 kg (129 lb 3 oz)   SpO2 95%   BMI 22 18 kg/m²     Medications:   Scheduled Meds:   chlordiazePOXIDE ( Librium ) 10 mg Oral Q8H EVA   docusate sodium 100 mg Oral BID   folic acid 1 mg Oral Daily   senna 1 tablet Oral Daily   thiamine 100 mg Oral Daily     Continuous Infusions:    PRN Meds:   acetaminophen    LORazepam iv 1/19 x 2     ondansetron    polyethylene glycol     Nursing orders - VS q 4 - Up and Oob as tolerated - Sequential compression device to le';s -  diet regular House     Abnormal Labs/Diagnostic Results: no labs on 1/20    MRI Brain  Done 1/19 - read pending- Neuro read - no acute findings   EEG - 1/19 pm - recorded during wakefulness, drowsiness, and sleep is normal        Age/Sex: 42 y o  female     Assessment/Plan:  Attending note pending    Neurology note 1/20 - hallucations -  Now resolved, likely 2/2 alcohol withdrawal   Left hypodensity?  Noted on CT head was likely an artifact since MRI brain did not reveal anything     Signed off on 1/20    Psych eval 1/19 -  Cleared - refer to [facility] program  As an OP     Discharge Plan: MATT

## 2018-01-20 NOTE — PROGRESS NOTES
Progress Note - Neurology   Zenon Vale 42 y o  female MRN: [id number]  Unit/Bed#: CW2 217-03 Encounter: [id number]    Assessment:  hallucations/seizure -  Now resolved, likely 2/2 alcohol withdrawal, previously recommended AED but patient refused  Left hypodensity? Noted on CT head was likely an artifact since MRI brain did not reveal anything  Plan:  MRI brain with and without contrast was negative for any acute findings which I personally reviewed  EEG routine was normal  No need for AED at this time  No other neurologic recommendations at this time  Patient was told no driving for 6 months, TAO dot form filled and faxed and patient verbalized understanding    Will sign off  Please call us back if there are any further questions/concerns  Subjective:   Hallucinations are completely resolved  She states she slept well  No headaches, chest pain, shortness of breath or any other concerns this AM      ROS:  Negative except mentioned in subjective  negative    Vitals: Blood pressure 107/70, pulse 103, temperature 97 7 °F (36 5 °C), temperature source Oral, resp  rate 18, height 5' 4" (1 626 m), weight 58 6 kg (129 lb 3 oz), SpO2 95 %, not currently breastfeeding  ,Body mass index is 22 18 kg/m²      Physical Exam:   General: not in any acute distress, looks comfortable  HEENT: normocephalic, atraumatic  Chest: clear to ausculation bilaterally  Heart: regular, no murmurs/rubs  Skin: no lesions  Mental status: alert, awake, follows commands  Speech: fluent, no dysarthria  Cranial nerves: PERRL, EOMI, no facial asymmetry noted, + shoulder shrug strong  Motor: 5/5 throughout    Lab, Imaging and other studies:   CBC:   Results from last 7 days  Lab Units 01/19/18  0654 01/19/18  0107   WBC Thousand/uL 4 90 5 36   RBC Million/uL 3 38* 3 37*   HEMOGLOBIN g/dL 11 6 11 6   HEMATOCRIT % 33 4* 33 1*   MCV fL 99* 98   PLATELETS Thousands/uL 175 182     VTE Prophylaxis: Sequential compression device (Venodyne)     Total combined time spent 35 minutes today  Greater than 50% of total time was spent with the patient and or family counseling and or coordination of care  A brief description of coordination of care: alcohol cessation counseling, and no driving, and seizure precautions

## 2018-01-22 ENCOUNTER — GENERIC CONVERSION - ENCOUNTER (OUTPATIENT)
Dept: OTHER | Facility: OTHER | Age: 38
End: 2018-01-22

## 2018-01-23 VITALS
BODY MASS INDEX: 22.02 KG/M2 | DIASTOLIC BLOOD PRESSURE: 80 MMHG | WEIGHT: 129 LBS | RESPIRATION RATE: 12 BRPM | HEART RATE: 70 BPM | HEIGHT: 64 IN | SYSTOLIC BLOOD PRESSURE: 116 MMHG

## 2018-01-23 NOTE — PROCEDURES
Procedures by Tasha Gregorio MD at 2018   5:10 PM      Author:  Tasha Gregorio MD Service:  Neurology Author Type:  Physician    Filed:  2018  5:12 PM Date of Service:  2018  5:10 PM Status:  Signed    :  Tasha Gregorio MD (Physician)        Procedure Orders:       1  EEG awake or drowsy routine [[id number]] ordered by Jacqueline Barreto MD at 18 1140                  ELECTROENCEPHALOGRAM (EEG)      Patient Name:  Nikita Black  MRN: [id number]   :  1980 File #: Julio Aguero    Age: 42 y o  Encounter #: [id number]   Date performed: 2018            Report date: 2018          Study type: Routine EEG    ICD 10 diagnosis: Spells/Fit NOS R56 9    Start time: 14:14 End time: 14:42     -------------------------------------------------------------------------------------------------------------------   Patient History: This recording was observed in a 42 y o  female with prior alcohol withdrawal  seizures to evaluate for risk of epilepsy  Medications include:   chlordiazePOXIDE 10 mg Oral Q8H [facility] & intermediate   docusate sodium 100 mg Oral BID   folic acid 1 mg Oral Daily   senna 1 tablet Oral Daily   thiamine 100 mg Oral Daily       -------------------------------------------------------------------------------------------------------------------   Description of Procedure:  ·  32 channel digital recording with electrodes placed according to the International 10-20 system with additional  T1/T2 electrodes, EOG, EKG, and simultaneous  video  The recording was technically satisfactory  -------------------------------------------------------------------------------------------------------------------   EEG Description:    The recording was performed with the patient awake, drowsy, and asleep  She was fully oriented  During wakefulness, there were long runs of well regulated, low amplitude, posteriorly  dominant, symmetric 11-13 cps alpha rhythm that attenuated with eye opening  There were symmetric low amplitude, frontally dominant beta activities  With drowsiness, alpha activity attenuated and was replaced by diffusely distributed theta activities  With sleep, symmetric vertex sharp waves, K complexes and sleep spindles were present      -------------------------------------------------------------------------------------------------------------------   Activation Procedures:  Hyperventilation was not performed  Stepped photic stimulation between 1-20 cps was performed and induced symmetric  photic driving  Other findings: The single lead ECG demonstrated normal sinus rhythm    -------------------------------------------------------------------------------------------------------------------   EEG Interpretation: This Routine EEG recorded during wakefulness, drowsiness, and sleep is normal     Calista Sampson MD   [facility]               Received for:Axel BRUNER    Jan 19 2018  5:12PM [facility] Standard Time

## 2018-01-24 NOTE — MISCELLANEOUS
Chief Complaint  Chief Complaint Free Text Note Form: pt  called this morning on 01/22/2018 for a f/u visit  History of Present Illness  TCM Communication [facility]: The patient is being contacted for follow-up after hospitalization and 01/25/2018  She was hospitalized at [facility]  The dates of hospitalization: 01/18/2018, date of discharge: 01/20/2018  Diagnosis: ETOH withdrawal, hallucination  She was discharged to home  Medications reviewed and updated today  She scheduled a follow up appointment  Counseling was provided to the patient  Communication performed and completed by Mike Levin MA 01/22/2018      Active Problems    1  Alcohol related seizure (780 39) (R56 9)   2  Alcoholism (303 90) (F10 20)   3  Alcoholism in remission (303 93) (F10 21)   4  Alcoholism with psychosis with hallucinations (291 3,303 90) (F10 951)   5  Annual physical exam (V70 0) (Z00 00)   6  ASCUS favor benign (796 9)   7  Blood tests for routine general physical examination (V72 62) (Z00 00)   8  Dysfunctional uterine bleeding (626 8) (N93 8)   9  Encounter for routine gynecological examination (V72 31) (Z01 419)   10  HPV (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 4) (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 7)   11  Need for prophylactic vaccination and inoculation against influenza (V04 81) (Z23)   12  Need for vaccination for DTaP (V06 1) (Z23)   13  Other insomnia (780 52) (G47 09)   14  Screening for HPV (human papillomavirus) (V73 81) (Z11 51)   15  Tuberculosis screening (V74 1) (Z11 1)    Past Medical History    1  History of Bruising, spontaneous (782 7) (R23 3)   2  History of Endometrial polyp (621 0) (N84 0)   3  History of Mastodynia (611 71) (N64 4)   4  History of Nulliparity (V61 8)   5  History of Prolonged menstruation (626 2) (N92 1)   6  History of Well adult on routine health check (V70 0) (Z00 00)   7  History of [place] teeth extracted (525 50,525 10) (H37 616)    Surgical History    1   History of Closed Treatment Of Mandibular Fracture On The Left   2  History of Colposcopy Cervix With Biopsy(S) With Endocervical Curettage   3  History of Dilation And Curettage   4  History of Inguinal Hernia Repair   5  History of Knee Arthroscopy (Therapeutic)   6  History of Nose Surgery    Family History  Mother    1  Family history of Family Health Status Of Mother - Good   2  Denied: Family history of drug abuse   3  Denied: Family history of Mental health problem  Father    4  Family history of amyotrophic lateral sclerosis (V17 2) (Z82 0)   5  Denied: Family history of drug abuse   6  Family history of Hyperlipidemia   7  Family history of Hypertension (V17 49)   8  Denied: Family history of Mental health problem  Maternal Grandmother    9  Family history of Osteoporosis (V17 81)   10  Family history of Tuberculosis  Paternal Grandmother    6  Family history of Alzheimer Disease  Maternal Grandfather    15  Family history of Stroke Syndrome (V17 1)  Paternal Grandfather    15  Family history of Alzheimer Disease    Social History    · Daily caffeinated coffee consumption   · Denied: History of Drug Use   · Exercises moderately 3 or more times a week   · Former consumption of alcohol (V11 3) (K94 504)   · Never a smoker   · Occasional caffeine consumption   · Denied: History of Uses drugs daily    Current Meds   1  No Reported Medications Recorded    Allergies    1  Sulfa Drugs    2  No Known Environmental Allergies   3  No Known Food Allergies    Health Management  Encounter for routine gynecological examination   (1) THIN PREP PAP WITH IMAGING; every 2 years; Last [id number]; Next Due: 20Mar2017;  Overdue    Signatures   Electronically signed by : Opal Neely ; Jan 22 2018  4:48PM EST                       (Author)    Electronically signed by : Chiqui Rodriguez DO; Jan 23 2018  9:10AM EST

## 2018-01-25 ENCOUNTER — TRANSITIONAL CARE MANAGEMENT (OUTPATIENT)
Dept: FAMILY MEDICINE CLINIC | Facility: CLINIC | Age: 38
End: 2018-01-25

## 2018-01-25 ENCOUNTER — OFFICE VISIT (OUTPATIENT)
Dept: FAMILY MEDICINE CLINIC | Facility: CLINIC | Age: 38
End: 2018-01-25
Payer: COMMERCIAL

## 2018-01-25 VITALS
HEART RATE: 88 BPM | RESPIRATION RATE: 14 BRPM | HEIGHT: 63 IN | DIASTOLIC BLOOD PRESSURE: 66 MMHG | BODY MASS INDEX: 23.21 KG/M2 | SYSTOLIC BLOOD PRESSURE: 100 MMHG | WEIGHT: 131 LBS

## 2018-01-25 DIAGNOSIS — F10.10 ETOH ABUSE: Primary | ICD-10-CM

## 2018-01-25 PROBLEM — F10.251: Status: ACTIVE | Noted: 2018-01-25

## 2018-01-25 PROBLEM — F10.951: Status: ACTIVE | Noted: 2018-01-25

## 2018-01-25 PROBLEM — B97.7 HPV (HUMAN PAPILLOMA VIRUS) INFECTION: Status: ACTIVE | Noted: 2018-01-25

## 2018-01-25 PROBLEM — F10.20 ALCOHOLISM (HCC): Status: ACTIVE | Noted: 2018-01-25

## 2018-01-25 PROBLEM — G47.09 OTHER INSOMNIA: Status: ACTIVE | Noted: 2018-01-25

## 2018-01-25 PROCEDURE — 99496 TRANSJ CARE MGMT HIGH F2F 7D: CPT | Performed by: NURSE PRACTITIONER

## 2018-01-25 NOTE — PROGRESS NOTES
Assessment/Plan:       Diagnoses and all orders for this visit:    ETOH abuse  Comments:  Currently enrolled in IOP program and will continue same  Taking Folic acid, thiamine, multi vit and fish oil  Add campral   rto 6 weeks or prn             Subjective:     Patient ID: Anat Dill is a 42 y o  female  Here today for [facility] visit form recent hospitalization for alcohol abuse  Was hospitalized on 1/18 and discharged on 1/20 from [facility]  Is currently enrolled in a IOP prorgam and attends 3 days a week  Is not working currently  Is taking multi vit, thiamine, folic acid and will add campral that she has at home  Is not expericing any hallucinations   Is staying with her parents and has additional support of her sister  Review of Systems   Constitutional: Negative for appetite change and fatigue  HENT: Negative  Respiratory: Negative  Cardiovascular: Negative  Gastrointestinal: Negative  Neurological: Positive for tremors  Negative for dizziness, seizures, numbness and headaches  Psychiatric/Behavioral: Negative for agitation, confusion, hallucinations, self-injury, sleep disturbance and suicidal ideas  Objective:     Physical Exam   Constitutional: She is oriented to person, place, and time  She appears well-developed and well-nourished  Neck: Normal range of motion  Cardiovascular: Normal rate and regular rhythm  Pulmonary/Chest: Effort normal    Neurological: She is alert and oriented to person, place, and time  Skin: Skin is warm  Psychiatric: She has a normal mood and affect   Her behavior is normal  Judgment and thought content normal

## 2018-01-25 NOTE — ASSESSMENT & PLAN NOTE
Currently enrolled in IOP program and will continue same  Taking Folic acid, thiamine, multi vit and fish oil  Add campral   Continue individual counseling  rto in 6 weeks to jacey but call with any questions

## 2018-01-25 NOTE — PROGRESS NOTES
Pt  Presents in the office today due to a f/u visit from [facility]  Patient ID: Jacinda Maciel is a 42 y o  female      HPI        Review of Systems      Objective:     Physical Exam

## 2018-03-01 ENCOUNTER — OFFICE VISIT (OUTPATIENT)
Dept: FAMILY MEDICINE CLINIC | Facility: CLINIC | Age: 38
End: 2018-03-01

## 2018-03-01 VITALS
HEART RATE: 90 BPM | DIASTOLIC BLOOD PRESSURE: 68 MMHG | WEIGHT: 131 LBS | BODY MASS INDEX: 23.21 KG/M2 | SYSTOLIC BLOOD PRESSURE: 100 MMHG | HEIGHT: 63 IN

## 2018-03-01 DIAGNOSIS — Z11.1 SCREENING FOR TUBERCULOSIS: ICD-10-CM

## 2018-03-01 DIAGNOSIS — F10.10 ALCOHOL ABUSE: ICD-10-CM

## 2018-03-01 DIAGNOSIS — Z00.00 ANNUAL PHYSICAL EXAM: Primary | ICD-10-CM

## 2018-03-01 PROCEDURE — 99395 PREV VISIT EST AGE 18-39: CPT | Performed by: NURSE PRACTITIONER

## 2018-03-01 PROCEDURE — 86580 TB INTRADERMAL TEST: CPT | Performed by: NURSE PRACTITIONER

## 2018-03-01 RX ORDER — ACAMPROSATE CALCIUM 333 MG/1
666 TABLET, DELAYED RELEASE ORAL 3 TIMES DAILY
Qty: 180 TABLET | Refills: 5 | Status: SHIPPED | OUTPATIENT
Start: 2018-03-01 | End: 2019-03-21 | Stop reason: ALTCHOICE

## 2018-03-01 NOTE — PROGRESS NOTES
Thanh Barragan is a 42 y o   female and is here for work pe  The patient reports no problems  Cancer Screening    Pap last pap 2016  Abnormal pap? no                Immunization History   Administered Date(s) Administered    Influenza 11/17/2016, 06/01/2017    Influenza Quadrivalent, 6-35 Months IM 11/17/2016, 06/01/2017    Td (adult), adsorbed 01/01/2007    Tdap 06/02/2015    Tuberculin Skin Test-PPD Intradermal 06/02/2015, 06/17/2015, 09/08/2015, 02/19/2016         Dentist Visit last was 2 weeks ago         The following portions of the patient's history were reviewed and updated as appropriate: allergies, current medications, past family history, past medical history, past social history, past surgical history and problem list     Review of Systems  Review of Systems   Constitutional: Negative  HENT: Negative  Eyes: Negative  Respiratory: Negative  Cardiovascular: Negative  Gastrointestinal: Negative  Endocrine: Negative  Musculoskeletal: Negative  Skin: Negative  Allergic/Immunologic: Negative  Neurological: Negative  Hematological: Negative  Psychiatric/Behavioral: Negative  Objective   Vitals:    03/01/18 1058   BP: 100/68   Pulse: 90     Physical Exam   Constitutional: She is oriented to person, place, and time  She appears well-developed and well-nourished  HENT:   Head: Normocephalic  Right Ear: External ear normal    Left Ear: External ear normal    Nose: Nose normal    Mouth/Throat: Oropharynx is clear and moist    Eyes: Conjunctivae and EOM are normal  Pupils are equal, round, and reactive to light  Neck: Neck supple  Cardiovascular: Normal rate and regular rhythm  Pulmonary/Chest: Effort normal and breath sounds normal    Abdominal: Soft  Bowel sounds are normal    Musculoskeletal: Normal range of motion  Neurological: She is alert and oriented to person, place, and time  Skin: Skin is warm and dry     Psychiatric: She has a normal mood and affect  Her behavior is normal  Judgment and thought content normal        Assessment/Plan     Healthy female exam     1  routine physical : we have conducted your exam   PPD applied and will rto in 2 days to be read   2  Patient Counseling:  --Nutrition: Stressed importance of moderation in sodium/caffeine intake, saturated fat and cholesterol, caloric balance, sufficient intake of fresh fruits, vegetables, fiber, calcium, iron  --Exercise: Stressed the importance of regular exercise  --Injury prevention: Discussed safety belts, safety helmets, smoke detector, smoking near bedding or upholstery  --Dental health: Discussed importance of regular tooth brushing, flossing, and dental visits  --Immunizations reviewed  --Discussed benefits of screening colonoscopy  --After hours service discussed with patient    6  Follow up in one year

## 2018-03-23 ENCOUNTER — TELEPHONE (OUTPATIENT)
Dept: FAMILY MEDICINE CLINIC | Facility: CLINIC | Age: 38
End: 2018-03-23

## 2018-03-23 NOTE — TELEPHONE ENCOUNTER
Is it okay to schedule this patient for a 2-step PPD test?    Best number for Tosin Daniel:  [phone]

## 2018-03-26 ENCOUNTER — CLINICAL SUPPORT (OUTPATIENT)
Dept: FAMILY MEDICINE CLINIC | Facility: CLINIC | Age: 38
End: 2018-03-26
Payer: COMMERCIAL

## 2018-03-26 DIAGNOSIS — Z11.1 SCREENING FOR TUBERCULOSIS: Primary | ICD-10-CM

## 2018-03-26 LAB
INDURATION: 0 MM
TB SKIN TEST: NEGATIVE

## 2018-03-26 PROCEDURE — 86580 TB INTRADERMAL TEST: CPT

## 2018-04-05 ENCOUNTER — CLINICAL SUPPORT (OUTPATIENT)
Dept: FAMILY MEDICINE CLINIC | Facility: CLINIC | Age: 38
End: 2018-04-05
Payer: COMMERCIAL

## 2018-04-05 DIAGNOSIS — Z11.1 SCREENING FOR TUBERCULOSIS: Primary | ICD-10-CM

## 2018-04-05 PROCEDURE — 86580 TB INTRADERMAL TEST: CPT

## 2018-07-02 ENCOUNTER — OFFICE VISIT (OUTPATIENT)
Dept: FAMILY MEDICINE CLINIC | Facility: CLINIC | Age: 38
End: 2018-07-02

## 2018-07-02 VITALS
SYSTOLIC BLOOD PRESSURE: 122 MMHG | HEIGHT: 64 IN | WEIGHT: 128.3 LBS | DIASTOLIC BLOOD PRESSURE: 78 MMHG | BODY MASS INDEX: 21.91 KG/M2 | HEART RATE: 76 BPM | RESPIRATION RATE: 16 BRPM

## 2018-07-02 DIAGNOSIS — F10.20 ALCOHOLISM (HCC): ICD-10-CM

## 2018-07-02 DIAGNOSIS — F10.951: Primary | ICD-10-CM

## 2018-07-02 PROBLEM — R56.9 SEIZURE (HCC): Status: ACTIVE | Noted: 2018-07-02

## 2018-07-02 PROCEDURE — 99213 OFFICE O/P EST LOW 20 MIN: CPT | Performed by: NURSE PRACTITIONER

## 2018-07-02 RX ORDER — CHLORAL HYDRATE 500 MG
1000 CAPSULE ORAL DAILY
COMMUNITY
End: 2021-02-19 | Stop reason: ALTCHOICE

## 2018-07-02 NOTE — PROGRESS NOTES
Chief Complaint   Patient presents with    Follow-up     Seizures      Assessment/Plan:    1  Alcoholism   Is doing really well with the Campral and support network  Call if you are having any issues  2  Seizure: This was from abrupt alcohol withdrawal  Your workup was neg  You are alcohol free  We have completed your paperwork to return your drivers license  rto as needed       Subjective:      Patient ID: Miguel Hernández is a 42 y o  female  Here today for completion of forms to reinstate her drivers license  This was rescinded in January 2018 after a seizure which was the result of acute alcohol withdrawal  Has been on campral and alcohol free since that time  Had a similar occurrence in 2016 when she abruptly withdrew from alcohol   At that time did follow with a neuro and had CT and eeg all of which were normal    Also is going to [employer]  Is working full time,  Has a great support system and is feeling good               The following portions of the patient's history were reviewed and updated as appropriate: allergies, current medications, past family history, past medical history, past social history, past surgical history and problem list     Past Medical History:   Diagnosis Date    Seizures ([facility] [address] )      Past Surgical History:   Procedure Laterality Date    ANTERIOR CRUCIATE LIGAMENT REPAIR      CLOSED REDUCTION MANDIBLE Bilateral 6/26/2016    Procedure: CLOSED REDUCTION MANDIBLE;  Surgeon: Tyrell Max DMD;  Location: [facility];  Service:     COLPOSCOPY W/ BIOPSY / Nichole Askew  03/30/2015    COLP neg results /    Renea Pacheco DIAGNOSTIC / THERAPEUTIC  06/2010    possible polyp    [facility]    KNEE ARTHROSCOPY  2007    ACL repair    NOSE SURGERY  1996    revision     Family History   Problem Relation Age of Onset    No Known Problems Mother     Other Father         amyotrophic lateral sclerosis    Hyperlipidemia Father     Hypertension Father  Osteoporosis Maternal Grandmother     Tuberculosis Maternal Grandmother     Stroke Maternal Grandfather         stroke syndrome    Alzheimer's disease Paternal Grandmother     Alzheimer's disease Paternal Grandfather     Substance Abuse Neg Hx     Mental illness Neg Hx      Social History   Social History     Social History    Marital status: Single     Spouse name: N/A    Number of children: N/A    Years of education: N/A     Occupational History    Not on file  Social History Main Topics    Smoking status: Never Smoker    Smokeless tobacco: Never Used    Alcohol use No      Comment: former consumption excessive drinking    Drug use: No    Sexual activity: Not on file     Other Topics Concern    Not on file     Social History Narrative    Daily caffeinated coffee consumption    Occasional caffeine consumption     Exercises moderately 3 or more times a week         Review of Systems   Constitutional: Negative  Respiratory: Negative  Cardiovascular: Negative  Musculoskeletal: Negative  Skin: Negative  Neurological: Negative  Psychiatric/Behavioral: Negative  Vitals:    07/02/18 0847   BP: 122/78   BP Location: Left arm   Patient Position: Sitting   Cuff Size: Standard   Pulse: 76   Resp: 16   Weight: 58 2 kg (128 lb 4 8 oz)   Height: 5' 4" (1 626 m)       Objective:   Wt Readings from Last 3 Encounters:   07/02/18 58 2 kg (128 lb 4 8 oz)   03/01/18 59 4 kg (131 lb)   01/25/18 59 4 kg (131 lb)     BP Readings from Last 3 Encounters:   07/02/18 122/78   03/01/18 100/68   01/25/18 100/66     Pulse Readings from Last 3 Encounters:   07/02/18 76   03/01/18 90   01/25/18 88     BMI Readings from Last 3 Encounters:   07/02/18 22 02 kg/m²   03/01/18 23 21 kg/m²   01/25/18 23 21 kg/m²     Clinical Support on 03/26/2018   Component Date Value Ref Range Status    TB Skin Test 03/26/2018 Negative   Final    Induration 03/26/2018 0  mm Final    0   Admission on 01/18/2018, Discharged on 01/20/2018   Component Date Value Ref Range Status    Sodium 01/18/2018 135* 136 - 145 mmol/L Final    Potassium 01/18/2018 3 3* 3 5 - 5 3 mmol/L Final    Chloride 01/18/2018 98* 100 - 108 mmol/L Final    CO2 01/18/2018 29  21 - 32 mmol/L Final    Anion Gap 01/18/2018 8  4 - 13 mmol/L Final    BUN 01/18/2018 10  5 - 25 mg/dL Final    Creatinine 01/18/2018 0 73  0 60 - 1 30 mg/dL Final    Standardized to IDMS reference method    Glucose 01/18/2018 82  65 - 140 mg/dL Final      If the patient is fasting, the ADA then defines impaired fasting glucose as > 100 mg/dL and diabetes as > or equal to 123 mg/dL  Specimen collection should occur prior to Sulfasalazine administration due to the potential for falsely depressed results  Specimen collection should occur prior to Sulfapyridine administration due to the potential for falsely elevated results   Calcium 01/18/2018 9 4  8 3 - 10 1 mg/dL Final    AST 01/18/2018 142* 5 - 45 U/L Final      Specimen collection should occur prior to Sulfasalazine administration due to the potential for falsely depressed results   ALT 01/18/2018 88* 12 - 78 U/L Final      Specimen collection should occur prior to Sulfasalazine and/or Sulfapyridine administration due to the potential for falsely depressed results       Alkaline Phosphatase 01/18/2018 113  46 - 116 U/L Final    Total Protein 01/18/2018 8 2  6 4 - 8 2 g/dL Final    Albumin 01/18/2018 4 1  3 5 - 5 0 g/dL Final    Total Bilirubin 01/18/2018 2 06* 0 20 - 1 00 mg/dL Final    eGFR 01/18/2018 104  ml/min/1 73sq m Final    Magnesium 01/18/2018 1 7  1 6 - 2 6 mg/dL Final    Phosphorus 01/18/2018 3 6  2 7 - 4 5 mg/dL Final    Amph/Meth UR 01/18/2018 Negative  Negative Final    Barbiturate Ur 01/18/2018 Negative  Negative Final    Benzodiazepine Urine 01/18/2018 Negative  Negative Final    Cocaine Urine 01/18/2018 Negative  Negative Final    Methadone Urine 01/18/2018 Negative  Negative Final    Opiate Urine 01/18/2018 Negative  Negative Final    PCP Ur 01/18/2018 Negative  Negative Final    THC Urine 01/18/2018 Negative  Negative Final    EXTBreath Alcohol 01/18/2018 0 000   Final    EXT PREG TEST UR (Ref: Negative) 01/18/2018 negative   Final    Ammonia 01/19/2018 14  11 - 35 umol/L Final      Specimen collection should occur prior to Sulfasalazine administration due to the potential for falsely elevated results  Specimen collection should occur prior to Sulfapyridine administration due to the potential for falsely depressed results      Ventricular Rate 01/18/2018 84  BPM Final    Atrial Rate 01/18/2018 84  BPM Final    TN Interval 01/18/2018 120  ms Final    QRSD Interval 01/18/2018 86  ms Final    QT Interval 01/18/2018 376  ms Final    QTC Interval 01/18/2018 444  ms Final    P Axis 01/18/2018 60  degrees Final    QRS Axis 01/18/2018 58  degrees Final    T Wave Axis 01/18/2018 35  degrees Final    WBC 01/19/2018 4 90  4 31 - 10 16 Thousand/uL Final    RBC 01/19/2018 3 38* 3 81 - 5 12 Million/uL Final    Hemoglobin 01/19/2018 11 6  11 5 - 15 4 g/dL Final    Hematocrit 01/19/2018 33 4* 34 8 - 46 1 % Final    MCV 01/19/2018 99* 82 - 98 fL Final    MCH 01/19/2018 34 3  26 8 - 34 3 pg Final    MCHC 01/19/2018 34 7  31 4 - 37 4 g/dL Final    RDW 01/19/2018 13 2  11 6 - 15 1 % Final    MPV 01/19/2018 9 9  8 9 - 12 7 fL Final    Platelets 85/95/3290 175  149 - 390 Thousands/uL Final    nRBC 01/19/2018 0  /100 WBCs Final    Neutrophils Relative 01/19/2018 53  43 - 75 % Final    Lymphocytes Relative 01/19/2018 25  14 - 44 % Final    Monocytes Relative 01/19/2018 17* 4 - 12 % Final    Eosinophils Relative 01/19/2018 5  0 - 6 % Final    Basophils Relative 01/19/2018 0  0 - 1 % Final    Neutrophils Absolute 01/19/2018 2 59  1 85 - 7 62 Thousands/µL Final    Lymphocytes Absolute 01/19/2018 1 22  0 60 - 4 47 Thousands/µL Final    Monocytes Absolute 01/19/2018 0 83  0 17 - 1 22 Thousand/µL Final    Eosinophils Absolute 01/19/2018 0 24  0 00 - 0 61 Thousand/µL Final    Basophils Absolute 01/19/2018 0 02  0 00 - 0 10 Thousands/µL Final    WBC 01/19/2018 5 36  4 31 - 10 16 Thousand/uL Final    RBC 01/19/2018 3 37* 3 81 - 5 12 Million/uL Final    Hemoglobin 01/19/2018 11 6  11 5 - 15 4 g/dL Final    Hematocrit 01/19/2018 33 1* 34 8 - 46 1 % Final    MCV 01/19/2018 98  82 - 98 fL Final    MCH 01/19/2018 34 4* 26 8 - 34 3 pg Final    MCHC 01/19/2018 35 0  31 4 - 37 4 g/dL Final    RDW 01/19/2018 13 1  11 6 - 15 1 % Final    MPV 01/19/2018 10 1  8 9 - 12 7 fL Final    Platelets 46/69/6547 182  149 - 390 Thousands/uL Final    nRBC 01/19/2018 0  /100 WBCs Final    Neutrophils Relative 01/19/2018 53  43 - 75 % Final    Lymphocytes Relative 01/19/2018 27  14 - 44 % Final    Monocytes Relative 01/19/2018 15* 4 - 12 % Final    Eosinophils Relative 01/19/2018 4  0 - 6 % Final    Basophils Relative 01/19/2018 1  0 - 1 % Final    Neutrophils Absolute 01/19/2018 2 83  1 85 - 7 62 Thousands/µL Final    Lymphocytes Absolute 01/19/2018 1 45  0 60 - 4 47 Thousands/µL Final    Monocytes Absolute 01/19/2018 0 80  0 17 - 1 22 Thousand/µL Final    Eosinophils Absolute 01/19/2018 0 22  0 00 - 0 61 Thousand/µL Final    Basophils Absolute 01/19/2018 0 05  0 00 - 0 10 Thousands/µL Final    Sodium 01/19/2018 138  136 - 145 mmol/L Final    Potassium 01/19/2018 3 6  3 5 - 5 3 mmol/L Final    Chloride 01/19/2018 106  100 - 108 mmol/L Final    CO2 01/19/2018 25  21 - 32 mmol/L Final    Anion Gap 01/19/2018 7  4 - 13 mmol/L Final    BUN 01/19/2018 7  5 - 25 mg/dL Final    Creatinine 01/19/2018 0 57* 0 60 - 1 30 mg/dL Final    Standardized to IDMS reference method    Glucose 01/19/2018 102  65 - 140 mg/dL Final      If the patient is fasting, the ADA then defines impaired fasting glucose as > 100 mg/dL and diabetes as > or equal to 123 mg/dL    Specimen collection should occur prior to Sulfasalazine administration due to the potential for falsely depressed results  Specimen collection should occur prior to Sulfapyridine administration due to the potential for falsely elevated results   Calcium 01/19/2018 8 3  8 3 - 10 1 mg/dL Final    eGFR 01/19/2018 117  ml/min/1 73sq m Final    Magnesium 01/19/2018 2 1  1 6 - 2 6 mg/dL Final    Phosphorus 01/19/2018 2 5* 2 7 - 4 5 mg/dL Final          Physical Exam   Constitutional: She is oriented to person, place, and time  She appears well-developed and well-nourished  Neck: Normal range of motion  Neck supple  Cardiovascular: Normal rate and regular rhythm  Pulmonary/Chest: Effort normal and breath sounds normal    Neurological: She is alert and oriented to person, place, and time  Skin: Skin is warm and dry  Psychiatric: She has a normal mood and affect   Her behavior is normal  Judgment and thought content normal

## 2019-03-21 ENCOUNTER — HOSPITAL ENCOUNTER (OUTPATIENT)
Dept: MAMMOGRAPHY | Facility: CLINIC | Age: 39
Discharge: HOME | End: 2019-03-21
Payer: COMMERCIAL

## 2019-03-21 ENCOUNTER — OFFICE VISIT (OUTPATIENT)
Dept: FAMILY MEDICINE CLINIC | Facility: CLINIC | Age: 39
End: 2019-03-21
Payer: COMMERCIAL

## 2019-03-21 VITALS
RESPIRATION RATE: 12 BRPM | HEART RATE: 64 BPM | SYSTOLIC BLOOD PRESSURE: 100 MMHG | HEIGHT: 64 IN | BODY MASS INDEX: 21 KG/M2 | DIASTOLIC BLOOD PRESSURE: 60 MMHG | WEIGHT: 123 LBS

## 2019-03-21 DIAGNOSIS — Z12.39 SCREENING FOR MALIGNANT NEOPLASM OF BREAST: ICD-10-CM

## 2019-03-21 DIAGNOSIS — Z23 NEED FOR TUBERCULOSIS VACCINATION: Primary | ICD-10-CM

## 2019-03-21 PROCEDURE — 99396 PREV VISIT EST AGE 40-64: CPT | Performed by: NURSE PRACTITIONER

## 2019-03-21 PROCEDURE — 77067 SCR MAMMO BI INCL CAD: CPT

## 2019-03-21 NOTE — PROGRESS NOTES
Subjective     Ruby Mcfarland is a 34 y o   female and is here for routine health maintenance and PE for work   The patient reports no problems  Cancer Screening  Colononoscopy N/A  Mammogram will get today   Pap UTD  Abnormal pap? no        Immunization History   Administered Date(s) Administered    INFLUENZA 11/17/2016, 06/01/2017    Influenza Quadrivalent, 6-35 Months IM 11/17/2016, 06/01/2017    Td (adult), adsorbed 01/01/2007    Tdap 06/02/2015    Tuberculin Skin Test-PPD Intradermal 06/02/2015, 06/17/2015, 09/08/2015, 02/19/2016, 03/01/2018, 03/26/2018, 04/05/2018        History:  LMP: No LMP recorded  Dentist Visit  within 6-12 months      The following portions of the patient's history were reviewed and updated as appropriate: allergies, current medications, past family history, past medical history, past social history, past surgical history and problem list       Review of Systems  Review of Systems   Constitutional: Negative  HENT: Negative  Eyes: Negative  Respiratory: Negative  Cardiovascular: Negative  Gastrointestinal: Negative  Endocrine: Negative  Genitourinary: Negative  Musculoskeletal: Negative  Skin: Negative  Allergic/Immunologic: Negative  Neurological: Negative  Hematological: Negative  Psychiatric/Behavioral: Negative  Objective   Vitals:    03/21/19 0900   BP: 100/60   Pulse: 64   Resp: 12     Wt Readings from Last 3 Encounters:   03/21/19 55 8 kg (123 lb)   07/02/18 58 2 kg (128 lb 4 8 oz)   03/01/18 59 4 kg (131 lb)     BP Readings from Last 3 Encounters:   03/21/19 100/60   07/02/18 122/78   03/01/18 100/68     Pulse Readings from Last 3 Encounters:   03/21/19 64   07/02/18 76   03/01/18 90     BMI Readings from Last 3 Encounters:   03/21/19 21 11 kg/m²   07/02/18 22 02 kg/m²   03/01/18 23 21 kg/m²     Physical Exam   Constitutional: She is oriented to person, place, and time   She appears well-developed and well-nourished  HENT:   Head: Normocephalic  Eyes: Pupils are equal, round, and reactive to light  Neck: Normal range of motion  Neck supple  Cardiovascular: Normal rate and regular rhythm  Pulmonary/Chest: Effort normal and breath sounds normal    Abdominal: Soft  Bowel sounds are normal    Musculoskeletal: Normal range of motion  Neurological: She is alert and oriented to person, place, and time  Skin: Skin is warm  Psychiatric: She has a normal mood and affect  Her behavior is normal  Judgment and thought content normal        Assessment/Plan     Healthy female exam     1  PE; this was conducted and PE work form was completed  2  Patient Counseling:  --Nutrition: Stressed importance of moderation in sodium/caffeine intake, saturated fat and cholesterol, caloric balance, sufficient intake of fresh fruits, vegetables, fiber, calcium, iron  --Exercise: Stressed the importance of regular exercise  --Injury prevention: Discussed safety belts, safety helmets, smoke detector, smoking near bedding or upholstery  --Dental health: Discussed importance of regular tooth brushing, flossing, and dental visits  --Immunizations reviewed  --Discussed benefits of screening colonoscopy  --After hours service discussed with patient    3  Cancer Screening : will get mammo today   4  Labs none needed  5  Follow up in one year

## 2020-03-17 ENCOUNTER — TELEPHONE (OUTPATIENT)
Dept: FAMILY MEDICINE CLINIC | Facility: CLINIC | Age: 40
End: 2020-03-17

## 2020-03-17 NOTE — TELEPHONE ENCOUNTER
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

## 2020-03-20 NOTE — TELEPHONE ENCOUNTER
Coughing has resolved, ans can we send a note to her employer?    fx [phone] attn Jaida Collins director of rehab  Patient was out all this week and is going back on Monday

## 2020-07-06 ENCOUNTER — TELEPHONE (OUTPATIENT)
Dept: FAMILY MEDICINE CLINIC | Facility: CLINIC | Age: 40
End: 2020-07-06

## 2020-07-06 DIAGNOSIS — Z12.39 SCREENING FOR MALIGNANT NEOPLASM OF BREAST: Primary | ICD-10-CM

## 2020-12-29 ENCOUNTER — HOSPITAL ENCOUNTER (OUTPATIENT)
Dept: MAMMOGRAPHY | Facility: CLINIC | Age: 40
Discharge: HOME | End: 2020-12-29
Payer: COMMERCIAL

## 2020-12-29 VITALS — WEIGHT: 123 LBS | HEIGHT: 64 IN | BODY MASS INDEX: 21 KG/M2

## 2020-12-29 DIAGNOSIS — Z12.39 SCREENING FOR MALIGNANT NEOPLASM OF BREAST: ICD-10-CM

## 2020-12-29 PROCEDURE — 77063 BREAST TOMOSYNTHESIS BI: CPT

## 2020-12-29 PROCEDURE — 77067 SCR MAMMO BI INCL CAD: CPT

## 2021-02-19 ENCOUNTER — OFFICE VISIT (OUTPATIENT)
Dept: OBGYN CLINIC | Facility: CLINIC | Age: 41
End: 2021-02-19
Payer: COMMERCIAL

## 2021-02-19 VITALS
WEIGHT: 122 LBS | DIASTOLIC BLOOD PRESSURE: 62 MMHG | HEIGHT: 64 IN | BODY MASS INDEX: 20.83 KG/M2 | SYSTOLIC BLOOD PRESSURE: 100 MMHG

## 2021-02-19 DIAGNOSIS — Z01.419 ENCNTR FOR GYN EXAM (GENERAL) (ROUTINE) W/O ABN FINDINGS: ICD-10-CM

## 2021-02-19 DIAGNOSIS — Z12.31 ENCOUNTER FOR SCREENING MAMMOGRAM FOR MALIGNANT NEOPLASM OF BREAST: ICD-10-CM

## 2021-02-19 DIAGNOSIS — Z11.3 SCREEN FOR STD (SEXUALLY TRANSMITTED DISEASE): Primary | ICD-10-CM

## 2021-02-19 PROCEDURE — 87624 HPV HI-RISK TYP POOLED RSLT: CPT | Performed by: PHYSICIAN ASSISTANT

## 2021-02-19 PROCEDURE — G0145 SCR C/V CYTO,THINLAYER,RESCR: HCPCS | Performed by: PHYSICIAN ASSISTANT

## 2021-02-19 PROCEDURE — 87491 CHLMYD TRACH DNA AMP PROBE: CPT | Performed by: PHYSICIAN ASSISTANT

## 2021-02-19 PROCEDURE — 87591 N.GONORRHOEAE DNA AMP PROB: CPT | Performed by: PHYSICIAN ASSISTANT

## 2021-02-19 PROCEDURE — S0610 ANNUAL GYNECOLOGICAL EXAMINA: HCPCS | Performed by: PHYSICIAN ASSISTANT

## 2021-02-19 RX ORDER — MULTIVIT-MIN/IRON FUM/FOLIC AC 7.5 MG-4
1 TABLET ORAL DAILY
COMMUNITY
End: 2022-05-17 | Stop reason: ALTCHOICE

## 2021-02-19 NOTE — PROGRESS NOTES
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 [place]  Contraception: She uses nothing for contraception  STD testing:  She does want STD testing today  Last Pap 2015 ASCUS pos HPV, benign colpo   Last Mammo 12/29/20 neg    We reviewed ASCCP guidelines for Pap testing today       Family hx of breast cancer: no  Family hx of ovarian cancer: no  Family hx of colon cancer: no      Current Outpatient Medications:     Multiple Vitamins-Minerals (multivitamin with minerals) tablet, Take 1 tablet by mouth daily, Disp: , Rfl:   Social History     Socioeconomic History    Marital status: Single     Spouse name: Not on file    Number of children: Not on file    Years of education: Not on file    Highest education level: Not on file   Occupational History    Not on file   Social Needs    Financial resource strain: Not on file    Food insecurity     Worry: Not on file     Inability: Not on file    Transportation needs     Medical: Not on file     Non-medical: Not on file   Tobacco Use    Smoking status: Never Smoker    Smokeless tobacco: Never Used   Substance and Sexual Activity    Alcohol use: No     Comment: former consumption excessive drinking    Drug use: Not Currently    Sexual activity: Yes     Partners: Female   Lifestyle    Physical activity     Days per week: Not on file     Minutes per session: Not on file    Stress: Not on file   Relationships    Social connections     Talks on phone: Not on file     Gets together: Not on file     Attends Advent service: Not on file     Active member of club or organization: Not on file     Attends meetings of clubs or organizations: Not on file     Relationship status: Not on file  Intimate partner violence     Fear of current or ex partner: Not on file     Emotionally abused: Not on file     Physically abused: Not on file     Forced sexual activity: Not on file   Other Topics Concern    Not on file   Social History Narrative    Daily caffeinated coffee consumption    Occasional caffeine consumption     Exercises moderately 3 or more times a week     Family History   Problem Relation Age of Onset    No Known Problems Mother     Other Father         amyotrophic lateral sclerosis    Osteoporosis Maternal Grandmother     Tuberculosis Maternal Grandmother     Stroke Maternal Grandfather         stroke syndrome    Alzheimer's disease Paternal Grandmother     Alzheimer's disease Paternal Grandfather     Thyroid cancer Sister 40    No Known Problems Maternal Aunt     No Known Problems Paternal Aunt     Substance Abuse Neg Hx     Mental illness Neg Hx       Past Medical History:   Diagnosis Date    Seizures ([facility] [address] )         Review of Systems   Respiratory: Negative  Cardiovascular: Negative  Gastrointestinal: Negative for constipation and diarrhea  Genitourinary: Negative for difficulty urinating, pelvic pain, vaginal bleeding, vaginal discharge, itching or odor  O:  Blood pressure 100/62, height 5' 3 78" (1 62 m), weight 55 3 kg (122 lb), last menstrual period 02/02/2021, not currently breastfeeding  Patient appears well and is not in distress  Neck is supple without masses  Breasts are symmetrical without mass, tenderness, nipple discharge, skin changes or adenopathy  Abdomen is soft and nontender without masses  External genitals are normal without lesions or rashes  Urethral meatus and urethra are normal  Bladder is normal to palpation  Vagina is normal without discharge or bleeding  Cervix is normal without discharge or lesion  Uterus is normal, mobile, nontender without palpable mass  Adnexa are normal, nontender, without palpable mass       A:  Yearly exam  P:   Pap and HPV today    GC/chlamydia today    Mammo slip provided      RTO one year for yearly exam or sooner as needed

## 2021-02-23 LAB
C TRACH DNA SPEC QL NAA+PROBE: NEGATIVE
HPV HR 12 DNA CVX QL NAA+PROBE: NEGATIVE
HPV16 DNA CVX QL NAA+PROBE: NEGATIVE
HPV18 DNA CVX QL NAA+PROBE: NEGATIVE
LAB AP GYN PRIMARY INTERPRETATION: NORMAL
Lab: NORMAL
N GONORRHOEA DNA SPEC QL NAA+PROBE: NEGATIVE
PATH INTERP SPEC-IMP: NORMAL

## 2022-01-17 ENCOUNTER — HOSPITAL ENCOUNTER (OUTPATIENT)
Dept: MAMMOGRAPHY | Facility: CLINIC | Age: 42
Discharge: HOME | End: 2022-01-17
Payer: COMMERCIAL

## 2022-01-17 VITALS — BODY MASS INDEX: 20.83 KG/M2 | HEIGHT: 64 IN | WEIGHT: 122 LBS

## 2022-01-17 DIAGNOSIS — Z12.31 ENCOUNTER FOR SCREENING MAMMOGRAM FOR MALIGNANT NEOPLASM OF BREAST: ICD-10-CM

## 2022-01-17 PROCEDURE — 77067 SCR MAMMO BI INCL CAD: CPT

## 2022-01-17 PROCEDURE — 77063 BREAST TOMOSYNTHESIS BI: CPT

## 2022-05-17 ENCOUNTER — ANNUAL EXAM (OUTPATIENT)
Dept: OBGYN CLINIC | Facility: CLINIC | Age: 42
End: 2022-05-17
Payer: COMMERCIAL

## 2022-05-17 VITALS
DIASTOLIC BLOOD PRESSURE: 62 MMHG | SYSTOLIC BLOOD PRESSURE: 100 MMHG | WEIGHT: 124 LBS | HEIGHT: 64 IN | BODY MASS INDEX: 21.17 KG/M2

## 2022-05-17 DIAGNOSIS — Z01.419 ENCNTR FOR GYN EXAM (GENERAL) (ROUTINE) W/O ABN FINDINGS: ICD-10-CM

## 2022-05-17 DIAGNOSIS — Z12.31 ENCOUNTER FOR SCREENING MAMMOGRAM FOR MALIGNANT NEOPLASM OF BREAST: ICD-10-CM

## 2022-05-17 PROBLEM — B97.7 HPV (HUMAN PAPILLOMA VIRUS) INFECTION: Status: RESOLVED | Noted: 2018-01-25 | Resolved: 2022-05-17

## 2022-05-17 PROBLEM — F10.21 PERSONAL HISTORY OF ALCOHOLISM (HCC): Status: ACTIVE | Noted: 2018-01-25

## 2022-05-17 PROBLEM — Z87.898 HISTORY OF SEIZURE DUE TO ALCOHOL WITHDRAWAL: Status: ACTIVE | Noted: 2018-07-02

## 2022-05-17 PROBLEM — F10.10 ETOH ABUSE: Status: RESOLVED | Noted: 2018-01-20 | Resolved: 2022-05-17

## 2022-05-17 PROBLEM — G47.09 OTHER INSOMNIA: Status: RESOLVED | Noted: 2018-01-25 | Resolved: 2022-05-17

## 2022-05-17 PROBLEM — Z86.59 HISTORY OF SEIZURE DUE TO ALCOHOL WITHDRAWAL: Status: ACTIVE | Noted: 2018-07-02

## 2022-05-17 PROBLEM — F10.20 ALCOHOLISM (HCC): Status: RESOLVED | Noted: 2018-01-25 | Resolved: 2022-05-17

## 2022-05-17 PROCEDURE — S0612 ANNUAL GYNECOLOGICAL EXAMINA: HCPCS | Performed by: PHYSICIAN ASSISTANT

## 2022-05-17 NOTE — PROGRESS NOTES
Theodore November 1980      CC:  Yearly exam    S:  35 y o  female here for yearly exam  Her cycles are regular, not heavy or crampy  Sexual activity: She is sexually active with a female partner of a year without pain, bleeding or dryness  They met on [employer]  She works as an occupational therapist      STD testing:  She does want STD testing today  Her chart is updated today to reflect a HISTORY of alcoholism and seizure due to alcohol  She has not had a drink in over 4 years and has no thought to resume - she says she really scared herself  She was commended on her efforts  Pap 3/20/15 ASCUS +HPV  Colpo benign  Pap 2/19/21 neg/neg     Last Mammo 1/17/22 neg    We reviewed ASCCP guidelines for Pap testing today  Family hx of breast cancer: no  Family hx of ovarian cancer: no  Family hx of colon cancer: no    No current outpatient medications on file    Social History     Socioeconomic History    Marital status: Single     Spouse name: Not on file    Number of children: Not on file    Years of education: Not on file    Highest education level: Not on file   Occupational History    Not on file   Tobacco Use    Smoking status: Never Smoker    Smokeless tobacco: Never Used   Vaping Use    Vaping Use: Never used   Substance and Sexual Activity    Alcohol use: No     Comment: former consumption excessive drinking    Drug use: Not Currently    Sexual activity: Yes     Partners: Female   Other Topics Concern    Not on file   Social History Narrative    Daily caffeinated coffee consumption    Occasional caffeine consumption     Exercises moderately 3 or more times a week     Social Determinants of Health     Financial Resource Strain: Not on file   Food Insecurity: Not on file   Transportation Needs: Not on file   Physical Activity: Not on file   Stress: Not on file   Social Connections: Not on file   Intimate Partner Violence: Not on file   Housing Stability: Not on file     Family History   Problem Relation Age of Onset    No Known Problems Mother     Other Father         amyotrophic lateral sclerosis    Osteoporosis Maternal Grandmother     Tuberculosis Maternal Grandmother     Stroke Maternal Grandfather         stroke syndrome    Alzheimer's disease Paternal Grandmother     Alzheimer's disease Paternal Grandfather     Thyroid cancer Sister 40    No Known Problems Maternal Aunt     No Known Problems Paternal Aunt     Substance Abuse Neg Hx     Mental illness Neg Hx     BRCA2 Positive Neg Hx     BRCA2 Negative Neg Hx     BRCA 1/2 Neg Hx     BRCA1 Positive Neg Hx     BRCA1 Negative Neg Hx     Ovarian cancer Neg Hx     Endometrial cancer Neg Hx     Breast cancer additional onset Neg Hx     Colon cancer Neg Hx     Breast cancer Neg Hx       Past Medical History:   Diagnosis Date    Seizures ([facility]CC)         Review of Systems   Respiratory: Negative  Cardiovascular: Negative  Gastrointestinal: Negative for constipation and diarrhea  Genitourinary: Negative for difficulty urinating, pelvic pain, vaginal bleeding, vaginal discharge, itching or odor  O:  Blood pressure 100/62, height 5' 4 17" (1 63 m), weight 56 2 kg (124 lb), last menstrual period 04/29/2022, not currently breastfeeding  Patient appears well and is not in distress  Neck is supple without masses  Breasts are symmetrical without mass, tenderness, nipple discharge, skin changes or adenopathy  Abdomen is soft and nontender without masses  External genitals are normal without lesions or rashes  Urethral meatus and urethra are normal  Bladder is normal to palpation  Vagina is normal without discharge or bleeding  Cervix is normal without discharge or lesion  Uterus is normal, mobile, nontender without palpable mass  Adnexa are normal, nontender, without palpable mass       A:   Yearly exam     History of HPV     P:   Pap 2024   Mammo slip provided    Colonoscopy at 37     RTO one year for yearly exam or sooner as needed

## 2022-07-19 ENCOUNTER — OFFICE VISIT (OUTPATIENT)
Dept: FAMILY MEDICINE CLINIC | Facility: CLINIC | Age: 42
End: 2022-07-19
Payer: COMMERCIAL

## 2022-07-19 VITALS
RESPIRATION RATE: 15 BRPM | OXYGEN SATURATION: 97 % | HEART RATE: 80 BPM | HEIGHT: 64 IN | SYSTOLIC BLOOD PRESSURE: 110 MMHG | BODY MASS INDEX: 20.95 KG/M2 | DIASTOLIC BLOOD PRESSURE: 70 MMHG | WEIGHT: 122.7 LBS

## 2022-07-19 DIAGNOSIS — Z13.29 SCREENING FOR THYROID DISORDER: ICD-10-CM

## 2022-07-19 DIAGNOSIS — F10.11 HISTORY OF ALCOHOL ABUSE: ICD-10-CM

## 2022-07-19 DIAGNOSIS — Z13.6 SCREENING FOR CARDIOVASCULAR CONDITION: ICD-10-CM

## 2022-07-19 DIAGNOSIS — Z13.228 SCREENING FOR METABOLIC DISORDER: ICD-10-CM

## 2022-07-19 DIAGNOSIS — K13.0 CRACKED LIPS: Primary | ICD-10-CM

## 2022-07-19 DIAGNOSIS — Z13.89 SCREENING FOR BLOOD OR PROTEIN IN URINE: ICD-10-CM

## 2022-07-19 DIAGNOSIS — E55.9 VITAMIN D DEFICIENCY: ICD-10-CM

## 2022-07-19 DIAGNOSIS — Z13.220 SCREENING FOR LIPID DISORDERS: ICD-10-CM

## 2022-07-19 PROCEDURE — 3725F SCREEN DEPRESSION PERFORMED: CPT | Performed by: NURSE PRACTITIONER

## 2022-07-19 PROCEDURE — 99214 OFFICE O/P EST MOD 30 MIN: CPT | Performed by: NURSE PRACTITIONER

## 2022-07-19 NOTE — PROGRESS NOTES
Assessment/Plan:     Diagnoses and all orders for this visit:    Cracked lips  -     Vitamin B12; Future  -     Vitamin B6; Future  -     Folate; Future    Labs ordered for further evaluated any possible vitamin deficiencies which could be contributing to patient's dry, cracked lips  She is encouraged to continue to keep well hydrated and utilize her Aquaphor and Vaseline  We will contact her with results once available  Patient is encouraged to call our office for any questions/concerns, persistent or worsening symptoms  Patient states they understand and agree with treatment plan  Screening for blood or protein in urine  -     UA (URINE) with reflex to Scope    Vitamin D deficiency  -     Vitamin D 25 hydroxy; Future    Screening for thyroid disorder  -     TSH, 3rd generation with Free T4 reflex; Future    Screening for lipid disorders  -     Lipid panel; Future    Screening for cardiovascular condition  -     CBC and differential; Future    Screening for metabolic disorder  -     Comprehensive metabolic panel; Future    History of alcohol abuse      Patient notes this was approximately 4 years ago  She notes that she has since made a full recovery and declines any need for resources or support  Pt to f/u PRN  She is encouraged to schedule her annual physical   F/u pending results  Subjective:      Patient ID: Jesús Marcos is a 35 y o  female  Patient presents today for several months of dry cracked lips that split down at the middle  She denies any cracking or irritation in the lateral corners of her mouth  She denies any visible lesions  Patient has made a great effort to keep well hydrated during the day and has used Vaseline and aquaphor which she notes is very short lasting  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 wear a 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 mask which she notes does not help with drying her out          The following portions of the patient's history were reviewed and updated as appropriate: allergies, current medications, past family history, past medical history, past social history, past surgical history and problem list     Review of Systems    As noted per HPI  Objective:      /70   Pulse 80   Resp 15   Ht 5' 4" (1 626 m)   Wt 55 7 kg (122 lb 11 2 oz)   SpO2 97%   BMI 21 06 kg/m²          Physical Exam  Vitals reviewed  Constitutional:       General: She is not in acute distress  Appearance: Normal appearance  She is not ill-appearing  HENT:      Head: Normocephalic  Right Ear: Tympanic membrane, ear canal and external ear normal       Left Ear: Tympanic membrane, ear canal and external ear normal       Mouth/Throat:      Lips: No lesions  Mouth: Mucous membranes are dry  Comments: Lips dry and cracked at midline, no drainage or lesions noted  Neck:      Vascular: No carotid bruit  Cardiovascular:      Rate and Rhythm: Normal rate and regular rhythm  Pulses: Normal pulses  Heart sounds: Normal heart sounds  No murmur heard  Pulmonary:      [place]: Pulmonary effort is normal  No respiratory distress  Breath sounds: Normal breath sounds  No wheezing  Musculoskeletal:         General: Normal range of motion  Cervical back: Normal range of motion  Right lower leg: No edema  Left lower leg: No edema  Skin:     General: Skin is warm and dry  Neurological:      Mental Status: She is alert and oriented to person, place, and time  Mental status is at baseline     Psychiatric:         Mood and Affect: Mood normal          Behavior: Behavior normal

## 2022-07-20 ENCOUNTER — APPOINTMENT (OUTPATIENT)
Dept: LAB | Facility: CLINIC | Age: 42
End: 2022-07-20
Payer: COMMERCIAL

## 2022-07-20 DIAGNOSIS — Z13.6 SCREENING FOR CARDIOVASCULAR CONDITION: ICD-10-CM

## 2022-07-20 DIAGNOSIS — K13.0 CRACKED LIPS: ICD-10-CM

## 2022-07-20 DIAGNOSIS — Z13.228 SCREENING FOR METABOLIC DISORDER: ICD-10-CM

## 2022-07-20 DIAGNOSIS — E55.9 VITAMIN D DEFICIENCY: ICD-10-CM

## 2022-07-20 DIAGNOSIS — Z13.29 SCREENING FOR THYROID DISORDER: ICD-10-CM

## 2022-07-20 DIAGNOSIS — Z13.220 SCREENING FOR LIPID DISORDERS: ICD-10-CM

## 2022-07-20 LAB
25(OH)D3 SERPL-MCNC: 29.5 NG/ML (ref 30–100)
ALBUMIN SERPL BCP-MCNC: 4.1 G/DL (ref 3.5–5)
ALP SERPL-CCNC: 64 U/L (ref 46–116)
ALT SERPL W P-5'-P-CCNC: 16 U/L (ref 12–78)
ANION GAP SERPL CALCULATED.3IONS-SCNC: 8 MMOL/L (ref 4–13)
AST SERPL W P-5'-P-CCNC: 19 U/L (ref 5–45)
BASOPHILS # BLD AUTO: 0.05 THOUSANDS/ΜL (ref 0–0.1)
BASOPHILS NFR BLD AUTO: 1 % (ref 0–1)
BILIRUB SERPL-MCNC: 1.85 MG/DL (ref 0.2–1)
BILIRUB UR QL STRIP: NEGATIVE
BUN SERPL-MCNC: 10 MG/DL (ref 5–25)
CALCIUM SERPL-MCNC: 9.2 MG/DL (ref 8.3–10.1)
CHLORIDE SERPL-SCNC: 102 MMOL/L (ref 96–108)
CHOLEST SERPL-MCNC: 144 MG/DL
CLARITY UR: CLEAR
CO2 SERPL-SCNC: 27 MMOL/L (ref 21–32)
COLOR UR: COLORLESS
CREAT SERPL-MCNC: 0.81 MG/DL (ref 0.6–1.3)
EOSINOPHIL # BLD AUTO: 0.33 THOUSAND/ΜL (ref 0–0.61)
EOSINOPHIL NFR BLD AUTO: 6 % (ref 0–6)
ERYTHROCYTE [DISTWIDTH] IN BLOOD BY AUTOMATED COUNT: 13.1 % (ref 11.6–15.1)
FOLATE SERPL-MCNC: 17.9 NG/ML (ref 3.1–17.5)
GFR SERPL CREATININE-BSD FRML MDRD: 89 ML/MIN/1.73SQ M
GLUCOSE P FAST SERPL-MCNC: 92 MG/DL (ref 65–99)
GLUCOSE UR STRIP-MCNC: NEGATIVE MG/DL
HCT VFR BLD AUTO: 43.6 % (ref 34.8–46.1)
HDLC SERPL-MCNC: 58 MG/DL
HGB BLD-MCNC: 13.7 G/DL (ref 11.5–15.4)
HGB UR QL STRIP.AUTO: NEGATIVE
IMM GRANULOCYTES # BLD AUTO: 0.02 THOUSAND/UL (ref 0–0.2)
IMM GRANULOCYTES NFR BLD AUTO: 0 % (ref 0–2)
KETONES UR STRIP-MCNC: NEGATIVE MG/DL
LDLC SERPL CALC-MCNC: 76 MG/DL (ref 0–100)
LEUKOCYTE ESTERASE UR QL STRIP: NEGATIVE
LYMPHOCYTES # BLD AUTO: 1.72 THOUSANDS/ΜL (ref 0.6–4.47)
LYMPHOCYTES NFR BLD AUTO: 30 % (ref 14–44)
MCH RBC QN AUTO: 30.2 PG (ref 26.8–34.3)
MCHC RBC AUTO-ENTMCNC: 31.4 G/DL (ref 31.4–37.4)
MCV RBC AUTO: 96 FL (ref 82–98)
MONOCYTES # BLD AUTO: 0.57 THOUSAND/ΜL (ref 0.17–1.22)
MONOCYTES NFR BLD AUTO: 10 % (ref 4–12)
NEUTROPHILS # BLD AUTO: 3.15 THOUSANDS/ΜL (ref 1.85–7.62)
NEUTS SEG NFR BLD AUTO: 53 % (ref 43–75)
NITRITE UR QL STRIP: NEGATIVE
NONHDLC SERPL-MCNC: 86 MG/DL
NRBC BLD AUTO-RTO: 0 /100 WBCS
PH UR STRIP.AUTO: 7 [PH]
PLATELET # BLD AUTO: 271 THOUSANDS/UL (ref 149–390)
PMV BLD AUTO: 10.7 FL (ref 8.9–12.7)
POTASSIUM SERPL-SCNC: 4.5 MMOL/L (ref 3.5–5.3)
PROT SERPL-MCNC: 7.6 G/DL (ref 6.4–8.4)
PROT UR STRIP-MCNC: NEGATIVE MG/DL
RBC # BLD AUTO: 4.53 MILLION/UL (ref 3.81–5.12)
SODIUM SERPL-SCNC: 137 MMOL/L (ref 135–147)
SP GR UR STRIP.AUTO: 1.01 (ref 1–1.03)
TRIGL SERPL-MCNC: 51 MG/DL
TSH SERPL DL<=0.05 MIU/L-ACNC: 1.44 UIU/ML (ref 0.45–4.5)
UROBILINOGEN UR STRIP-ACNC: <2 MG/DL
VIT B12 SERPL-MCNC: 513 PG/ML (ref 100–900)
WBC # BLD AUTO: 5.84 THOUSAND/UL (ref 4.31–10.16)

## 2022-07-20 PROCEDURE — 80061 LIPID PANEL: CPT

## 2022-07-20 PROCEDURE — 82746 ASSAY OF FOLIC ACID SERUM: CPT

## 2022-07-20 PROCEDURE — 36415 COLL VENOUS BLD VENIPUNCTURE: CPT

## 2022-07-20 PROCEDURE — 85025 COMPLETE CBC W/AUTO DIFF WBC: CPT

## 2022-07-20 PROCEDURE — 84443 ASSAY THYROID STIM HORMONE: CPT

## 2022-07-20 PROCEDURE — 81003 URINALYSIS AUTO W/O SCOPE: CPT | Performed by: NURSE PRACTITIONER

## 2022-07-20 PROCEDURE — 82306 VITAMIN D 25 HYDROXY: CPT

## 2022-07-20 PROCEDURE — 80053 COMPREHEN METABOLIC PANEL: CPT

## 2022-07-20 PROCEDURE — 82607 VITAMIN B-12: CPT

## 2022-07-20 PROCEDURE — 84207 ASSAY OF VITAMIN B-6: CPT

## 2022-07-26 ENCOUNTER — OFFICE VISIT (OUTPATIENT)
Dept: FAMILY MEDICINE CLINIC | Facility: CLINIC | Age: 42
End: 2022-07-26
Payer: COMMERCIAL

## 2022-07-26 VITALS
WEIGHT: 123.8 LBS | OXYGEN SATURATION: 98 % | BODY MASS INDEX: 21.13 KG/M2 | HEIGHT: 64 IN | RESPIRATION RATE: 18 BRPM | HEART RATE: 62 BPM | DIASTOLIC BLOOD PRESSURE: 74 MMHG | SYSTOLIC BLOOD PRESSURE: 112 MMHG

## 2022-07-26 DIAGNOSIS — Z00.00 ANNUAL PHYSICAL EXAM: Primary | ICD-10-CM

## 2022-07-26 LAB — VIT B6 SERPL-MCNC: 8.1 UG/L (ref 3.4–65.2)

## 2022-07-26 PROCEDURE — 99396 PREV VISIT EST AGE 40-64: CPT | Performed by: NURSE PRACTITIONER

## 2022-07-26 NOTE — PROGRESS NOTES
ADULT ANNUAL PHYSICAL  [facility]    NAME: Yenifer Rush  AGE: 35 y o  SEX: female  : 1980     DATE: 2022     Assessment and Plan:  1  Labs UTD  2  Mammo UTD  3  PAP UTD  4  Immunizations UTD  5  F/u in 1 year for annual physical or sooner PRN  Problem List Items Addressed This Visit    None     Visit Diagnoses     Annual physical exam    -  Primary          Immunizations and preventive care screenings were discussed with patient today  Appropriate education was printed on patient's after visit summary  Counseling:  Alcohol/drug use: discussed moderation in alcohol intake, the recommendations for healthy alcohol use, and avoidance of illicit drug use  Dental Health: discussed importance of regular tooth brushing, flossing, and dental visits  Injury prevention: discussed safety/seat belts, safety helmets, smoke detectors, carbon dioxide detectors, and smoking near bedding or upholstery  Sexual health: discussed sexually transmitted diseases, partner selection, use of condoms, avoidance of unintended pregnancy, and contraceptive alternatives  · Exercise: the importance of regular exercise/physical activity was discussed  Recommend exercise 3-5 times per week for at least 30 minutes  Return in about 1 year (around 2023) for Annual physical      Chief Complaint:     Chief Complaint   Patient presents with    Physical Exam      History of Present Illness:     Adult Annual Physical   Patient here for a comprehensive physical exam  The patient reports no problems  Diet and Physical Activity  · Diet/Nutrition: well balanced diet, consuming 3-5 servings of fruits/vegetables daily and adequate fiber intake  · Exercise: moderate cardiovascular exercise, 3-4 times a week on average and 30-60 minutes on average        Depression Screening  PHQ-2/9 Depression Screening         General Health  · Sleep: sleeps well and gets 7-8 hours of sleep on average  · Hearing: normal - bilateral   · Vision: no vision problems, most recent eye exam <1 year ago and wears contacts  · Dental: regular dental visits, brushes teeth twice daily and flosses teeth occasionally  /GYN Health  · Patient is: premenopausal  · Last menstrual period: 07/23/2022  · Contraceptive method: none  Review of Systems:     Review of Systems   Constitutional: Negative  Negative for chills and fatigue  HENT: Negative  Respiratory: Negative  Negative for cough and shortness of breath  Cardiovascular: Negative  Negative for chest pain  Gastrointestinal: Negative  Genitourinary: Negative  Musculoskeletal: Negative  Negative for myalgias  Neurological: Negative         Past Medical History:     Past Medical History:   Diagnosis Date    Seizures [facility])       Past Surgical History:     Past Surgical History:   Procedure Laterality Date    ANTERIOR CRUCIATE LIGAMENT REPAIR      CLOSED REDUCTION MANDIBLE Bilateral 6/26/2016    Procedure: CLOSED REDUCTION MANDIBLE;  Surgeon: Chino Bui DMD;  Location: [facility];  Service:     COLPOSCOPY W/ BIOPSY / Dorman Oppenheim  03/30/2015    COLP neg results /    Yohan Harding, DIAGNOSTIC / THERAPEUTIC  06/2010    possible polyp    INGUINAL HERNIA REPAIR  1980    KNEE ARTHROSCOPY  2007    ACL repair    NOSE SURGERY  1996    revision      Social History:     Social History     Socioeconomic History    Marital status: Single     Spouse name: None    Number of children: None    Years of education: None    Highest education level: None   Occupational History    None   Tobacco Use    Smoking status: Never Smoker    Smokeless tobacco: Never Used   Vaping Use    Vaping Use: Never used   Substance and Sexual Activity    Alcohol use: No     Comment: former consumption excessive drinking    Drug use: Not Currently    Sexual activity: Yes     Partners: Female   Other Topics Concern    None   Social History Narrative    Daily caffeinated coffee consumption    Occasional caffeine consumption     Exercises moderately 3 or more times a week     Social Determinants of Health     Financial Resource Strain: Not on file   Food Insecurity: Not on file   Transportation Needs: Not on file   Physical Activity: Not on file   Stress: Not on file   Social Connections: Not on file   Intimate Partner Violence: Not on file   Housing Stability: Not on file      Family History:     Family History   Problem Relation Age of Onset    No Known Problems Mother     Other Father         amyotrophic lateral sclerosis    Osteoporosis Maternal Grandmother     Tuberculosis Maternal Grandmother     Stroke Maternal Grandfather         stroke syndrome    Alzheimer's disease Paternal Grandmother     Alzheimer's disease Paternal Grandfather     Thyroid cancer Sister 40    No Known Problems Maternal Aunt     No Known Problems Paternal Aunt     Substance Abuse Neg Hx     Mental illness Neg Hx     BRCA2 Positive Neg Hx     BRCA2 Negative Neg Hx     BRCA 1/2 Neg Hx     BRCA1 Positive Neg Hx     BRCA1 Negative Neg Hx     Ovarian cancer Neg Hx     Endometrial cancer Neg Hx     Breast cancer additional onset Neg Hx     Colon cancer Neg Hx     Breast cancer Neg Hx       Current Medications:     No current outpatient medications on file  No current facility-administered medications for this visit  Allergies: Allergies   Allergen Reactions    Sulfa Antibiotics Hives      Physical Exam:     /74   Pulse 62   Resp 18   Ht 5' 4" (1 626 m)   Wt 56 2 kg (123 lb 12 8 oz)   SpO2 98%   BMI 21 25 kg/m²     Physical Exam  Vitals and nursing note reviewed  Constitutional:       General: She is not in acute distress  Appearance: Normal appearance  She is well-developed  She is not ill-appearing  HENT:      Head: Normocephalic and atraumatic     Eyes: Conjunctiva/sclera: Conjunctivae normal    Neck:      Vascular: No carotid bruit  Cardiovascular:      Rate and Rhythm: Normal rate and regular rhythm  Pulses: Normal pulses  Heart sounds: Normal heart sounds  No murmur heard  Pulmonary:      [place]: Pulmonary effort is normal  No respiratory distress  Breath sounds: Normal breath sounds  No wheezing  Abdominal:      General: There is no distension  Palpations: Abdomen is soft  There is no mass  Tenderness: There is no abdominal tenderness  There is no guarding or rebound  Hernia: No hernia is present  Musculoskeletal:         General: Normal range of motion  Cervical back: Normal range of motion and neck supple  Right lower leg: No edema  Left lower leg: No edema  Skin:     General: Skin is warm and dry  Capillary Refill: Capillary refill takes less than 2 seconds  Neurological:      Mental Status: She is alert and oriented to person, place, and time  Mental status is at baseline  Motor: No weakness  Gait: Gait normal    Psychiatric:         Mood and Affect: Mood normal          Behavior: Behavior normal          Thought Content:  Thought content normal          Judgment: Judgment normal           BRAYAN Jensen  [address]

## 2022-07-26 NOTE — PATIENT INSTRUCTIONS
Vitamin D3 2,000 units daily  Wellness Visit for Adults   AMBULATORY CARE:   A wellness visit  is when you see your healthcare provider to get screened for health problems  Your healthcare provider will also give you advice on how to stay healthy  Write down your questions so you remember to ask them  Ask your healthcare provider how often you should have a wellness visit  What happens at a wellness visit:  Your healthcare provider will ask about your health, and your family history of health problems  This includes high blood pressure, heart disease, and cancer  He or she will ask if you have symptoms that concern you, if you smoke, and about your mood  You may also be asked about your intake of medicines, supplements, food, and alcohol  Any of the following may be done: Your weight  will be checked  Your height may also be checked so your body mass index (BMI) can be calculated  Your BMI shows if you are at a healthy weight  Your blood pressure  and heart rate will be checked  Your temperature may also be checked  Blood and urine tests  may be done  Blood tests may be done to check your cholesterol levels  Abnormal cholesterol levels increase your risk for heart disease and stroke  You may also need a blood or urine test to check for diabetes if you are at increased risk  Urine tests may be done to look for signs of an infection or kidney disease  A physical exam  includes checking your heartbeat and lungs with a stethoscope  Your healthcare provider may also check your skin to look for sun damage  Screening tests  may be recommended  A screening test is done to check for diseases that may not cause symptoms  The screening tests you may need depend on your age, gender, family history, and lifestyle habits  For example, colorectal screening may be recommended if you are 48years old or older  Screening tests you need if you are a woman:   A Pap smear  is used to screen for cervical cancer   Pap smears are usually done every 3 to 5 years depending on your age  You may need them more often if you have had abnormal Pap smear test results in the past  Ask your healthcare provider how often you should have a Pap smear  A mammogram  is an x-ray of your breasts to screen for breast cancer  Experts recommend mammograms every 2 years starting at age 48 years  You may need a mammogram at age 52 years or younger if you have an increased risk for breast cancer  Talk to your healthcare provider about when you should start having mammograms and how often you need them  Vaccines you may need:   Get an influenza vaccine  every year  The influenza vaccine protects you from the flu  Several types of viruses cause the flu  The viruses change over time, so new vaccines are made each year  Get a tetanus-diphtheria (Td) booster vaccine  every 10 years  This vaccine protects you against tetanus and diphtheria  Tetanus is a severe infection that may cause painful muscle spasms and lockjaw  Diphtheria is a severe bacterial infection that causes a thick covering in the back of your mouth and throat  Get a human papillomavirus (HPV) vaccine  if you are female and aged 23 to 32 or male 23 to 24 and never received it  This vaccine protects you from HPV infection  HPV is the most common infection spread by sexual contact  HPV may also cause vaginal, penile, and anal cancers  Get a pneumococcal vaccine  if you are aged 72 years or older  The pneumococcal vaccine is an injection given to protect you from pneumococcal disease  Pneumococcal disease is an infection caused by pneumococcal bacteria  The infection may cause pneumonia, meningitis, or an ear infection  Get a shingles vaccine  if you are 60 or older, even if you have had shingles before  The shingles vaccine is an injection to protect you from the varicella-zoster virus  This is the same virus that causes chickenpox   Shingles is a painful rash that develops in people who had chickenpox or have been exposed to the virus  How to eat healthy:  My Plate is a model for planning healthy meals  It shows the types and amounts of foods that should go on your plate  Fruits and vegetables make up about half of your plate, and grains and protein make up the other half  A serving of dairy is included on the side of your plate  The amount of calories and serving sizes you need depends on your age, gender, weight, and height  Examples of healthy foods are listed below:  Eat a variety of vegetables  such as dark green, red, and orange vegetables  You can also include canned vegetables low in sodium (salt) and frozen vegetables without added butter or sauces  Eat a variety of fresh fruits , canned fruit in 100% juice, frozen fruit, and dried fruit  Include whole grains  At least half of the grains you eat should be whole grains  Examples include whole-wheat bread, wheat pasta, brown rice, and whole-grain cereals such as oatmeal     Eat a variety of protein foods such as seafood (fish and shellfish), lean meat, and poultry without skin (turkey and chicken)  Examples of lean meats include pork leg, shoulder, or tenderloin, and beef round, sirloin, tenderloin, and extra lean ground beef  Other protein foods include eggs and egg substitutes, beans, peas, soy products, nuts, and seeds  Choose low-fat dairy products such as skim or 1% milk or low-fat yogurt, cheese, and cottage cheese  Limit unhealthy fats  such as butter, hard margarine, and shortening  Exercise:  Exercise at least 30 minutes per day on most days of the week  Some examples of exercise include walking, biking, dancing, and swimming  You can also fit in more physical activity by taking the stairs instead of the elevator or parking farther away from stores  Include muscle strengthening activities 2 days each week  Regular exercise provides many health benefits   It helps you manage your weight, and decreases your risk for type 2 diabetes, heart disease, stroke, and high blood pressure  Exercise can also help improve your mood  Ask your healthcare provider about the best exercise plan for you  General health and safety guidelines:   Do not smoke  Nicotine and other chemicals in cigarettes and cigars can cause lung damage  Ask your healthcare provider for information if you currently smoke and need help to quit  E-cigarettes or smokeless tobacco still contain nicotine  Talk to your healthcare provider before you use these products  Limit alcohol  A drink of alcohol is 12 ounces of beer, 5 ounces of wine, or 1½ ounces of liquor  Lose weight, if needed  Being overweight increases your risk of certain health conditions  These include heart disease, high blood pressure, type 2 diabetes, and certain types of cancer  Protect your skin  Do not sunbathe or use tanning beds  Use sunscreen with a SPF 15 or higher  Apply sunscreen at least 15 minutes before you go outside  Reapply sunscreen every 2 hours  Wear protective clothing, hats, and sunglasses when you are outside  Drive safely  Always wear your seatbelt  Make sure everyone in your car wears a seatbelt  A seatbelt can save your life if you are in an accident  Do not use your cell phone when you are driving  This could distract you and cause an accident  Pull over if you need to make a call or send a text message  Practice safe sex  Use latex condoms if are sexually active and have more than one partner  Your healthcare provider may recommend screening tests for sexually transmitted infections (STIs)  Wear helmets, lifejackets, and protective gear  Always wear a helmet when you ride a bike or motorcycle, go skiing, or play sports that could cause a head injury  Wear protective equipment when you play sports  Wear a lifejacket when you are on a boat or doing water sports      © Copyright "[employer] 2022 Information is for End User's use only and may not be sold, redistributed or otherwise used for commercial purposes  All illustrations and images included in CareNotes® are the copyrighted property of [employer]  or [employer]  The above information is an  only  It is not intended as medical advice for individual conditions or treatments  Talk to your doctor, nurse or pharmacist before following any medical regimen to see if it is safe and effective for you

## 2022-09-10 ENCOUNTER — HOSPITAL ENCOUNTER (EMERGENCY)
Facility: HOSPITAL | Age: 42
End: 2022-09-10
Attending: INTERNAL MEDICINE
Payer: COMMERCIAL

## 2022-09-10 ENCOUNTER — HOSPITAL ENCOUNTER (INPATIENT)
Facility: HOSPITAL | Age: 42
LOS: 6 days | Discharge: HOME | DRG: 405 | End: 2022-09-16
Attending: SURGERY | Admitting: SURGERY
Payer: COMMERCIAL

## 2022-09-10 ENCOUNTER — APPOINTMENT (EMERGENCY)
Dept: CT IMAGING | Facility: HOSPITAL | Age: 42
End: 2022-09-10
Payer: COMMERCIAL

## 2022-09-10 ENCOUNTER — APPOINTMENT (OUTPATIENT)
Dept: RADIOLOGY | Facility: HOSPITAL | Age: 42
End: 2022-09-10
Payer: COMMERCIAL

## 2022-09-10 VITALS
OXYGEN SATURATION: 97 % | WEIGHT: 120 LBS | SYSTOLIC BLOOD PRESSURE: 107 MMHG | BODY MASS INDEX: 20.49 KG/M2 | RESPIRATION RATE: 16 BRPM | TEMPERATURE: 98.2 F | HEART RATE: 74 BPM | HEIGHT: 64 IN | DIASTOLIC BLOOD PRESSURE: 61 MMHG

## 2022-09-10 DIAGNOSIS — R18.8 FREE FLUID IN PELVIS: ICD-10-CM
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
## 2022-09-10 DIAGNOSIS — R10.9 ABDOMINAL PAIN: Primary | ICD-10-CM

## 2022-09-10 DIAGNOSIS — S36.209A: ICD-10-CM

## 2022-09-10 LAB
ANION GAP SERPL CALCULATED.3IONS-SCNC: 9 MMOL/L (ref 4–13)
APTT PPP: 27 SECONDS (ref 23–37)
BACTERIA UR QL AUTO: ABNORMAL /HPF
BASOPHILS # BLD AUTO: 0.04 THOUSANDS/ΜL (ref 0–0.1)
BASOPHILS NFR BLD AUTO: 0 % (ref 0–1)
BILIRUB UR QL STRIP: NEGATIVE
BUN SERPL-MCNC: 11 MG/DL (ref 5–25)
CALCIUM SERPL-MCNC: 9.1 MG/DL (ref 8.4–10.2)
CHLORIDE SERPL-SCNC: 105 MMOL/L (ref 96–108)
CLARITY UR: ABNORMAL
CO2 SERPL-SCNC: 24 MMOL/L (ref 21–32)
COLOR UR: YELLOW
CREAT SERPL-MCNC: 0.95 MG/DL (ref 0.6–1.3)
EOSINOPHIL # BLD AUTO: 0.1 THOUSAND/ΜL (ref 0–0.61)
EOSINOPHIL NFR BLD AUTO: 1 % (ref 0–6)
ERYTHROCYTE [DISTWIDTH] IN BLOOD BY AUTOMATED COUNT: 12.8 % (ref 11.6–15.1)
EXT PREG TEST URINE: NEGATIVE
EXT. CONTROL ED NAV: NORMAL
GFR SERPL CREATININE-BSD FRML MDRD: 73 ML/MIN/1.73SQ M
GLUCOSE SERPL-MCNC: 109 MG/DL (ref 65–140)
GLUCOSE SERPL-MCNC: 119 MG/DL (ref 65–140)
GLUCOSE UR STRIP-MCNC: NEGATIVE MG/DL
HCT VFR BLD AUTO: 39.3 % (ref 34.8–46.1)
HGB BLD-MCNC: 12.7 G/DL (ref 11.5–15.4)
HGB UR QL STRIP.AUTO: ABNORMAL
IMM GRANULOCYTES # BLD AUTO: 0.05 THOUSAND/UL (ref 0–0.2)
IMM GRANULOCYTES NFR BLD AUTO: 1 % (ref 0–2)
INR PPP: 1.06 (ref 0.84–1.19)
KETONES UR STRIP-MCNC: ABNORMAL MG/DL
LEUKOCYTE ESTERASE UR QL STRIP: NEGATIVE
LYMPHOCYTES # BLD AUTO: 1.6 THOUSANDS/ΜL (ref 0.6–4.47)
LYMPHOCYTES NFR BLD AUTO: 18 % (ref 14–44)
MCH RBC QN AUTO: 31.7 PG (ref 26.8–34.3)
MCHC RBC AUTO-ENTMCNC: 32.3 G/DL (ref 31.4–37.4)
MCV RBC AUTO: 98 FL (ref 82–98)
MONOCYTES # BLD AUTO: 0.6 THOUSAND/ΜL (ref 0.17–1.22)
MONOCYTES NFR BLD AUTO: 7 % (ref 4–12)
NEUTROPHILS # BLD AUTO: 6.73 THOUSANDS/ΜL (ref 1.85–7.62)
NEUTS SEG NFR BLD AUTO: 73 % (ref 43–75)
NITRITE UR QL STRIP: NEGATIVE
NON-SQ EPI CELLS URNS QL MICRO: ABNORMAL /HPF
NRBC BLD AUTO-RTO: 0 /100 WBCS
PH UR STRIP.AUTO: 5 [PH]
PLATELET # BLD AUTO: 276 THOUSANDS/UL (ref 149–390)
PMV BLD AUTO: 10.3 FL (ref 8.9–12.7)
POTASSIUM SERPL-SCNC: 3.5 MMOL/L (ref 3.5–5.3)
PROT UR STRIP-MCNC: NEGATIVE MG/DL
PROTHROMBIN TIME: 13.9 SECONDS (ref 11.6–14.5)
RBC # BLD AUTO: 4.01 MILLION/UL (ref 3.81–5.12)
RBC #/AREA URNS AUTO: ABNORMAL /HPF
SODIUM SERPL-SCNC: 138 MMOL/L (ref 135–147)
SP GR UR STRIP.AUTO: 1.01 (ref 1–1.03)
UROBILINOGEN UR QL STRIP.AUTO: 0.2 E.U./DL
WBC # BLD AUTO: 9.12 THOUSAND/UL (ref 4.31–10.16)
WBC #/AREA URNS AUTO: ABNORMAL /HPF

## 2022-09-10 PROCEDURE — 81003 URINALYSIS AUTO W/O SCOPE: CPT | Performed by: PHYSICIAN ASSISTANT

## 2022-09-10 PROCEDURE — 71045 X-RAY EXAM CHEST 1 VIEW: CPT

## 2022-09-10 PROCEDURE — 81025 URINE PREGNANCY TEST: CPT | Performed by: PHYSICIAN ASSISTANT

## 2022-09-10 PROCEDURE — 80048 BASIC METABOLIC PNL TOTAL CA: CPT | Performed by: PHYSICIAN ASSISTANT

## 2022-09-10 PROCEDURE — 99222 1ST HOSP IP/OBS MODERATE 55: CPT | Performed by: SURGERY

## 2022-09-10 PROCEDURE — 82948 REAGENT STRIP/BLOOD GLUCOSE: CPT

## 2022-09-10 PROCEDURE — 74177 CT ABD & PELVIS W/CONTRAST: CPT

## 2022-09-10 PROCEDURE — 85730 THROMBOPLASTIN TIME PARTIAL: CPT | Performed by: PHYSICIAN ASSISTANT

## 2022-09-10 PROCEDURE — 93005 ELECTROCARDIOGRAM TRACING: CPT

## 2022-09-10 PROCEDURE — 81001 URINALYSIS AUTO W/SCOPE: CPT | Performed by: PHYSICIAN ASSISTANT

## 2022-09-10 PROCEDURE — 99285 EMERGENCY DEPT VISIT HI MDM: CPT

## 2022-09-10 PROCEDURE — 85610 PROTHROMBIN TIME: CPT | Performed by: PHYSICIAN ASSISTANT

## 2022-09-10 PROCEDURE — 85025 COMPLETE CBC W/AUTO DIFF WBC: CPT | Performed by: PHYSICIAN ASSISTANT

## 2022-09-10 PROCEDURE — 36415 COLL VENOUS BLD VENIPUNCTURE: CPT | Performed by: PHYSICIAN ASSISTANT

## 2022-09-10 PROCEDURE — 99285 EMERGENCY DEPT VISIT HI MDM: CPT | Performed by: PHYSICIAN ASSISTANT

## 2022-09-10 RX ORDER — LIDOCAINE 50 MG/G
1 PATCH TOPICAL DAILY
Status: DISCONTINUED | OUTPATIENT
Start: 2022-09-10 | End: 2022-09-11

## 2022-09-10 RX ORDER — ONDANSETRON 2 MG/ML
4 INJECTION INTRAMUSCULAR; INTRAVENOUS EVERY 6 HOURS PRN
Status: DISCONTINUED | OUTPATIENT
Start: 2022-09-10 | End: 2022-09-11

## 2022-09-10 RX ORDER — ACETAMINOPHEN 325 MG/1
975 TABLET ORAL EVERY 6 HOURS SCHEDULED
Status: DISCONTINUED | OUTPATIENT
Start: 2022-09-10 | End: 2022-09-11

## 2022-09-10 RX ORDER — ACETAMINOPHEN 325 MG/1
975 TABLET ORAL ONCE
Status: COMPLETED | OUTPATIENT
Start: 2022-09-10 | End: 2022-09-10

## 2022-09-10 RX ORDER — ACETAMINOPHEN 325 MG/1
650 TABLET ORAL EVERY 6 HOURS SCHEDULED
Status: DISCONTINUED | OUTPATIENT
Start: 2022-09-10 | End: 2022-09-10

## 2022-09-10 RX ORDER — HYDROMORPHONE HCL/PF 1 MG/ML
0.5 SYRINGE (ML) INJECTION
Status: DISCONTINUED | OUTPATIENT
Start: 2022-09-10 | End: 2022-09-11

## 2022-09-10 RX ORDER — OXYCODONE HYDROCHLORIDE 5 MG/1
5 TABLET ORAL EVERY 4 HOURS PRN
Status: DISCONTINUED | OUTPATIENT
Start: 2022-09-10 | End: 2022-09-11

## 2022-09-10 RX ORDER — ENOXAPARIN SODIUM 100 MG/ML
40 INJECTION SUBCUTANEOUS DAILY
Status: DISCONTINUED | OUTPATIENT
Start: 2022-09-11 | End: 2022-09-16 | Stop reason: HOSPADM

## 2022-09-10 RX ORDER — METHOCARBAMOL 500 MG/1
500 TABLET, FILM COATED ORAL EVERY 6 HOURS SCHEDULED
Status: DISCONTINUED | OUTPATIENT
Start: 2022-09-11 | End: 2022-09-11

## 2022-09-10 RX ORDER — OXYCODONE HYDROCHLORIDE 5 MG/1
2.5 TABLET ORAL EVERY 4 HOURS PRN
Status: DISCONTINUED | OUTPATIENT
Start: 2022-09-10 | End: 2022-09-11

## 2022-09-10 RX ORDER — DOCUSATE SODIUM 100 MG/1
100 CAPSULE, LIQUID FILLED ORAL 2 TIMES DAILY
Status: DISCONTINUED | OUTPATIENT
Start: 2022-09-11 | End: 2022-09-16 | Stop reason: HOSPADM

## 2022-09-10 RX ORDER — GABAPENTIN 100 MG/1
100 CAPSULE ORAL 3 TIMES DAILY
Status: DISCONTINUED | OUTPATIENT
Start: 2022-09-10 | End: 2022-09-11

## 2022-09-10 RX ORDER — ONDANSETRON 2 MG/ML
1 INJECTION INTRAMUSCULAR; INTRAVENOUS ONCE
Status: COMPLETED | OUTPATIENT
Start: 2022-09-10 | End: 2022-09-10

## 2022-09-10 RX ORDER — SODIUM CHLORIDE, SODIUM LACTATE, POTASSIUM CHLORIDE, CALCIUM CHLORIDE 600; 310; 30; 20 MG/100ML; MG/100ML; MG/100ML; MG/100ML
100 INJECTION, SOLUTION INTRAVENOUS CONTINUOUS
Status: DISCONTINUED | OUTPATIENT
Start: 2022-09-10 | End: 2022-09-11

## 2022-09-10 RX ADMIN — ACETAMINOPHEN 975 MG: 325 TABLET ORAL at 17:12

## 2022-09-10 RX ADMIN — IOHEXOL 100 ML: 350 INJECTION, SOLUTION INTRAVENOUS at 17:42

## 2022-09-10 RX ADMIN — METHOCARBAMOL 500 MG: 500 TABLET ORAL at 23:15

## 2022-09-10 RX ADMIN — ACETAMINOPHEN 975 MG: 325 TABLET ORAL at 23:15

## 2022-09-11 ENCOUNTER — ANESTHESIA EVENT (INPATIENT)
Dept: PERIOP | Facility: HOSPITAL | Age: 42
DRG: 406 | End: 2022-09-11
Payer: COMMERCIAL

## 2022-09-11 ENCOUNTER — ANESTHESIA (INPATIENT)
Dept: PERIOP | Facility: HOSPITAL | Age: 42
DRG: 406 | End: 2022-09-11
Payer: COMMERCIAL
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
## 2022-09-11 LAB
ABO GROUP BLD: NORMAL
ANION GAP SERPL CALCULATED.3IONS-SCNC: 4 MMOL/L (ref 4–13)
ANION GAP SERPL CALCULATED.3IONS-SCNC: 8 MMOL/L (ref 4–13)
ATRIAL RATE: 82 BPM
BASOPHILS # BLD AUTO: 0.03 THOUSANDS/ÂΜL (ref 0–0.1)
BASOPHILS # BLD AUTO: 0.05 THOUSANDS/ÂΜL (ref 0–0.1)
BASOPHILS NFR BLD AUTO: 0 % (ref 0–1)
BASOPHILS NFR BLD AUTO: 0 % (ref 0–1)
BLD GP AB SCN SERPL QL: NEGATIVE
BUN SERPL-MCNC: 8 MG/DL (ref 5–25)
BUN SERPL-MCNC: 9 MG/DL (ref 5–25)
CA-I BLD-SCNC: 0.97 MMOL/L (ref 1.12–1.32)
CALCIUM SERPL-MCNC: 7 MG/DL (ref 8.3–10.1)
CALCIUM SERPL-MCNC: 8.9 MG/DL (ref 8.3–10.1)
CHLORIDE SERPL-SCNC: 108 MMOL/L (ref 96–108)
CHLORIDE SERPL-SCNC: 114 MMOL/L (ref 96–108)
CO2 SERPL-SCNC: 18 MMOL/L (ref 21–32)
CO2 SERPL-SCNC: 26 MMOL/L (ref 21–32)
CREAT SERPL-MCNC: 0.68 MG/DL (ref 0.6–1.3)
CREAT SERPL-MCNC: 0.7 MG/DL (ref 0.6–1.3)
EOSINOPHIL # BLD AUTO: 0 THOUSAND/ÂΜL (ref 0–0.61)
EOSINOPHIL # BLD AUTO: 0.02 THOUSAND/ÂΜL (ref 0–0.61)
EOSINOPHIL NFR BLD AUTO: 0 % (ref 0–6)
EOSINOPHIL NFR BLD AUTO: 0 % (ref 0–6)
ERYTHROCYTE [DISTWIDTH] IN BLOOD BY AUTOMATED COUNT: 13.1 % (ref 11.6–15.1)
ERYTHROCYTE [DISTWIDTH] IN BLOOD BY AUTOMATED COUNT: 13.1 % (ref 11.6–15.1)
GFR SERPL CREATININE-BSD FRML MDRD: 106 ML/MIN/1.73SQ M
GFR SERPL CREATININE-BSD FRML MDRD: 107 ML/MIN/1.73SQ M
GLUCOSE SERPL-MCNC: 113 MG/DL (ref 65–140)
GLUCOSE SERPL-MCNC: 152 MG/DL (ref 65–140)
HCT VFR BLD AUTO: 35.1 % (ref 34.8–46.1)
HCT VFR BLD AUTO: 37.8 % (ref 34.8–46.1)
HGB BLD-MCNC: 11.3 G/DL (ref 11.5–15.4)
HGB BLD-MCNC: 12.5 G/DL (ref 11.5–15.4)
IMM GRANULOCYTES # BLD AUTO: 0.04 THOUSAND/UL (ref 0–0.2)
IMM GRANULOCYTES # BLD AUTO: 0.05 THOUSAND/UL (ref 0–0.2)
IMM GRANULOCYTES NFR BLD AUTO: 0 % (ref 0–2)
IMM GRANULOCYTES NFR BLD AUTO: 0 % (ref 0–2)
LYMPHOCYTES # BLD AUTO: 0.62 THOUSANDS/ÂΜL (ref 0.6–4.47)
LYMPHOCYTES # BLD AUTO: 1.49 THOUSANDS/ÂΜL (ref 0.6–4.47)
LYMPHOCYTES NFR BLD AUTO: 12 % (ref 14–44)
LYMPHOCYTES NFR BLD AUTO: 5 % (ref 14–44)
MAGNESIUM SERPL-MCNC: 1.4 MG/DL (ref 1.6–2.6)
MCH RBC QN AUTO: 31.3 PG (ref 26.8–34.3)
MCH RBC QN AUTO: 31.4 PG (ref 26.8–34.3)
MCHC RBC AUTO-ENTMCNC: 32.2 G/DL (ref 31.4–37.4)
MCHC RBC AUTO-ENTMCNC: 33.1 G/DL (ref 31.4–37.4)
MCV RBC AUTO: 95 FL (ref 82–98)
MCV RBC AUTO: 97 FL (ref 82–98)
MONOCYTES # BLD AUTO: 1.3 THOUSAND/ÂΜL (ref 0.17–1.22)
MONOCYTES # BLD AUTO: 1.42 THOUSAND/ÂΜL (ref 0.17–1.22)
MONOCYTES NFR BLD AUTO: 10 % (ref 4–12)
MONOCYTES NFR BLD AUTO: 11 % (ref 4–12)
NEUTROPHILS # BLD AUTO: 10.96 THOUSANDS/ÂΜL (ref 1.85–7.62)
NEUTROPHILS # BLD AUTO: 9.68 THOUSANDS/ÂΜL (ref 1.85–7.62)
NEUTS SEG NFR BLD AUTO: 78 % (ref 43–75)
NEUTS SEG NFR BLD AUTO: 84 % (ref 43–75)
NRBC BLD AUTO-RTO: 0 /100 WBCS
NRBC BLD AUTO-RTO: 0 /100 WBCS
P AXIS: 59 DEGREES
PHOSPHATE SERPL-MCNC: 2.5 MG/DL (ref 2.7–4.5)
PLATELET # BLD AUTO: 244 THOUSANDS/UL (ref 149–390)
PLATELET # BLD AUTO: 263 THOUSANDS/UL (ref 149–390)
PMV BLD AUTO: 9.7 FL (ref 8.9–12.7)
PMV BLD AUTO: 9.8 FL (ref 8.9–12.7)
POTASSIUM SERPL-SCNC: 3.9 MMOL/L (ref 3.5–5.3)
POTASSIUM SERPL-SCNC: 4 MMOL/L (ref 3.5–5.3)
PR INTERVAL: 116 MS
QRS AXIS: 52 DEGREES
QRSD INTERVAL: 84 MS
QT INTERVAL: 374 MS
QTC INTERVAL: 436 MS
RBC # BLD AUTO: 3.61 MILLION/UL (ref 3.81–5.12)
RBC # BLD AUTO: 3.98 MILLION/UL (ref 3.81–5.12)
RH BLD: POSITIVE
SODIUM SERPL-SCNC: 138 MMOL/L (ref 135–147)
SODIUM SERPL-SCNC: 140 MMOL/L (ref 135–147)
SPECIMEN EXPIRATION DATE: NORMAL
T WAVE AXIS: 37 DEGREES
VENTRICULAR RATE: 82 BPM
WBC # BLD AUTO: 12.59 THOUSAND/UL (ref 4.31–10.16)
WBC # BLD AUTO: 13.07 THOUSAND/UL (ref 4.31–10.16)

## 2022-09-11 PROCEDURE — 84295 ASSAY OF SERUM SODIUM: CPT

## 2022-09-11 PROCEDURE — 82947 ASSAY GLUCOSE BLOOD QUANT: CPT

## 2022-09-11 PROCEDURE — 86900 BLOOD TYPING SEROLOGIC ABO: CPT | Performed by: SURGERY

## 2022-09-11 PROCEDURE — 82803 BLOOD GASES ANY COMBINATION: CPT

## 2022-09-11 PROCEDURE — 86923 COMPATIBILITY TEST ELECTRIC: CPT

## 2022-09-11 PROCEDURE — 82330 ASSAY OF CALCIUM: CPT | Performed by: SURGERY

## 2022-09-11 PROCEDURE — 83735 ASSAY OF MAGNESIUM: CPT | Performed by: SURGERY

## 2022-09-11 PROCEDURE — 86901 BLOOD TYPING SEROLOGIC RH(D): CPT | Performed by: SURGERY

## 2022-09-11 PROCEDURE — 84100 ASSAY OF PHOSPHORUS: CPT | Performed by: SURGERY

## 2022-09-11 PROCEDURE — 0WJG4ZZ INSPECTION OF PERITONEAL CAVITY, PERCUTANEOUS ENDOSCOPIC APPROACH: ICD-10-PCS | Performed by: SURGERY

## 2022-09-11 PROCEDURE — 85014 HEMATOCRIT: CPT

## 2022-09-11 PROCEDURE — NC001 PR NO CHARGE: Performed by: SURGERY

## 2022-09-11 PROCEDURE — 88307 TISSUE EXAM BY PATHOLOGIST: CPT | Performed by: PATHOLOGY

## 2022-09-11 PROCEDURE — 82330 ASSAY OF CALCIUM: CPT

## 2022-09-11 PROCEDURE — 84132 ASSAY OF SERUM POTASSIUM: CPT

## 2022-09-11 PROCEDURE — C1781 MESH (IMPLANTABLE): HCPCS | Performed by: SURGERY

## 2022-09-11 PROCEDURE — 80048 BASIC METABOLIC PNL TOTAL CA: CPT | Performed by: SURGERY

## 2022-09-11 PROCEDURE — 86850 RBC ANTIBODY SCREEN: CPT | Performed by: SURGERY

## 2022-09-11 PROCEDURE — 85025 COMPLETE CBC W/AUTO DIFF WBC: CPT | Performed by: SURGERY

## 2022-09-11 PROCEDURE — 48140 PARTIAL REMOVAL OF PANCREAS: CPT | Performed by: SURGERY

## 2022-09-11 PROCEDURE — 07TP0ZZ RESECTION OF SPLEEN, OPEN APPROACH: ICD-10-PCS | Performed by: SURGERY

## 2022-09-11 PROCEDURE — 93010 ELECTROCARDIOGRAM REPORT: CPT | Performed by: INTERNAL MEDICINE

## 2022-09-11 PROCEDURE — 88305 TISSUE EXAM BY PATHOLOGIST: CPT | Performed by: PATHOLOGY

## 2022-09-11 PROCEDURE — 0FBG0ZZ EXCISION OF PANCREAS, OPEN APPROACH: ICD-10-PCS | Performed by: SURGERY

## 2022-09-11 DEVICE — SEAMGUARD STPL REINF ECHELON FLEX ENDOPATH 45: Type: IMPLANTABLE DEVICE | Site: PANCREAS | Status: FUNCTIONAL

## 2022-09-11 RX ORDER — PROMETHAZINE HYDROCHLORIDE 25 MG/ML
12.5 INJECTION, SOLUTION INTRAMUSCULAR; INTRAVENOUS ONCE AS NEEDED
Status: DISCONTINUED | OUTPATIENT
Start: 2022-09-11 | End: 2022-09-11 | Stop reason: HOSPADM

## 2022-09-11 RX ORDER — GLYCOPYRROLATE 0.2 MG/ML
INJECTION INTRAMUSCULAR; INTRAVENOUS AS NEEDED
Status: DISCONTINUED | OUTPATIENT
Start: 2022-09-11 | End: 2022-09-11

## 2022-09-11 RX ORDER — GABAPENTIN 250 MG/5ML
400 SOLUTION ORAL 4 TIMES DAILY
Status: DISCONTINUED | OUTPATIENT
Start: 2022-09-11 | End: 2022-09-11

## 2022-09-11 RX ORDER — PROPOFOL 10 MG/ML
INJECTION, EMULSION INTRAVENOUS AS NEEDED
Status: DISCONTINUED | OUTPATIENT
Start: 2022-09-11 | End: 2022-09-11

## 2022-09-11 RX ORDER — SODIUM CHLORIDE, SODIUM LACTATE, POTASSIUM CHLORIDE, CALCIUM CHLORIDE 600; 310; 30; 20 MG/100ML; MG/100ML; MG/100ML; MG/100ML
INJECTION, SOLUTION INTRAVENOUS CONTINUOUS PRN
Status: DISCONTINUED | OUTPATIENT
Start: 2022-09-11 | End: 2022-09-11

## 2022-09-11 RX ORDER — ONDANSETRON 2 MG/ML
4 INJECTION INTRAMUSCULAR; INTRAVENOUS ONCE AS NEEDED
Status: DISCONTINUED | OUTPATIENT
Start: 2022-09-11 | End: 2022-09-11 | Stop reason: HOSPADM

## 2022-09-11 RX ORDER — HYDROMORPHONE HCL IN WATER/PF 6 MG/30 ML
0.2 PATIENT CONTROLLED ANALGESIA SYRINGE INTRAVENOUS
Status: DISCONTINUED | OUTPATIENT
Start: 2022-09-11 | End: 2022-09-11 | Stop reason: HOSPADM

## 2022-09-11 RX ORDER — DIPHENHYDRAMINE HYDROCHLORIDE 50 MG/ML
25 INJECTION INTRAMUSCULAR; INTRAVENOUS EVERY 6 HOURS PRN
Status: DISCONTINUED | OUTPATIENT
Start: 2022-09-11 | End: 2022-09-13

## 2022-09-11 RX ORDER — LIDOCAINE HYDROCHLORIDE 10 MG/ML
INJECTION, SOLUTION EPIDURAL; INFILTRATION; INTRACAUDAL; PERINEURAL AS NEEDED
Status: DISCONTINUED | OUTPATIENT
Start: 2022-09-11 | End: 2022-09-11

## 2022-09-11 RX ORDER — GABAPENTIN 250 MG/5ML
300 SOLUTION ORAL 3 TIMES DAILY
Status: DISCONTINUED | OUTPATIENT
Start: 2022-09-11 | End: 2022-09-13

## 2022-09-11 RX ORDER — METRONIDAZOLE 500 MG/100ML
500 INJECTION, SOLUTION INTRAVENOUS ONCE
Status: COMPLETED | OUTPATIENT
Start: 2022-09-11 | End: 2022-09-11

## 2022-09-11 RX ORDER — BUPIVACAINE HYDROCHLORIDE 2.5 MG/ML
INJECTION, SOLUTION EPIDURAL; INFILTRATION; INTRACAUDAL AS NEEDED
Status: DISCONTINUED | OUTPATIENT
Start: 2022-09-11 | End: 2022-09-11

## 2022-09-11 RX ORDER — GABAPENTIN 250 MG/5ML
300 SOLUTION ORAL 4 TIMES DAILY
Status: DISCONTINUED | OUTPATIENT
Start: 2022-09-12 | End: 2022-09-11

## 2022-09-11 RX ORDER — ACETAMINOPHEN 160 MG/5ML
975 SUSPENSION, ORAL (FINAL DOSE FORM) ORAL EVERY 8 HOURS SCHEDULED
Status: DISCONTINUED | OUTPATIENT
Start: 2022-09-11 | End: 2022-09-13

## 2022-09-11 RX ORDER — METOCLOPRAMIDE HYDROCHLORIDE 5 MG/ML
10 INJECTION INTRAMUSCULAR; INTRAVENOUS ONCE AS NEEDED
Status: DISCONTINUED | OUTPATIENT
Start: 2022-09-11 | End: 2022-09-11 | Stop reason: HOSPADM

## 2022-09-11 RX ORDER — ALBUTEROL SULFATE 2.5 MG/3ML
2.5 SOLUTION RESPIRATORY (INHALATION) ONCE AS NEEDED
Status: DISCONTINUED | OUTPATIENT
Start: 2022-09-11 | End: 2022-09-11 | Stop reason: HOSPADM

## 2022-09-11 RX ORDER — SODIUM CHLORIDE, SODIUM LACTATE, POTASSIUM CHLORIDE, CALCIUM CHLORIDE 600; 310; 30; 20 MG/100ML; MG/100ML; MG/100ML; MG/100ML
125 INJECTION, SOLUTION INTRAVENOUS CONTINUOUS
Status: CANCELLED | OUTPATIENT
Start: 2022-09-11

## 2022-09-11 RX ORDER — MIDAZOLAM HYDROCHLORIDE 2 MG/2ML
INJECTION, SOLUTION INTRAMUSCULAR; INTRAVENOUS AS NEEDED
Status: DISCONTINUED | OUTPATIENT
Start: 2022-09-11 | End: 2022-09-11

## 2022-09-11 RX ORDER — DEXAMETHASONE SODIUM PHOSPHATE 10 MG/ML
INJECTION, SOLUTION INTRAMUSCULAR; INTRAVENOUS AS NEEDED
Status: DISCONTINUED | OUTPATIENT
Start: 2022-09-11 | End: 2022-09-11

## 2022-09-11 RX ORDER — HYDRALAZINE HYDROCHLORIDE 20 MG/ML
5 INJECTION INTRAMUSCULAR; INTRAVENOUS
Status: DISCONTINUED | OUTPATIENT
Start: 2022-09-11 | End: 2022-09-11 | Stop reason: HOSPADM

## 2022-09-11 RX ORDER — OXYCODONE HYDROCHLORIDE 10 MG/1
10 TABLET ORAL EVERY 4 HOURS PRN
Status: DISCONTINUED | OUTPATIENT
Start: 2022-09-11 | End: 2022-09-11

## 2022-09-11 RX ORDER — METOCLOPRAMIDE HYDROCHLORIDE 5 MG/ML
5 INJECTION INTRAMUSCULAR; INTRAVENOUS EVERY 6 HOURS PRN
Status: DISCONTINUED | OUTPATIENT
Start: 2022-09-11 | End: 2022-09-15

## 2022-09-11 RX ORDER — XYLITOL/YERBA SANTA
5 AEROSOL, SPRAY WITH PUMP (ML) MUCOUS MEMBRANE 4 TIMES DAILY PRN
Status: DISCONTINUED | OUTPATIENT
Start: 2022-09-11 | End: 2022-09-15

## 2022-09-11 RX ORDER — NEOSTIGMINE METHYLSULFATE 1 MG/ML
INJECTION INTRAVENOUS AS NEEDED
Status: DISCONTINUED | OUTPATIENT
Start: 2022-09-11 | End: 2022-09-11

## 2022-09-11 RX ORDER — CEFAZOLIN SODIUM 1 G/3ML
INJECTION, POWDER, FOR SOLUTION INTRAMUSCULAR; INTRAVENOUS AS NEEDED
Status: DISCONTINUED | OUTPATIENT
Start: 2022-09-11 | End: 2022-09-11

## 2022-09-11 RX ORDER — METOCLOPRAMIDE HYDROCHLORIDE 5 MG/ML
5 INJECTION INTRAMUSCULAR; INTRAVENOUS EVERY 6 HOURS PRN
Status: DISCONTINUED | OUTPATIENT
Start: 2022-09-11 | End: 2022-09-11

## 2022-09-11 RX ORDER — METRONIDAZOLE 500 MG/100ML
500 INJECTION, SOLUTION INTRAVENOUS EVERY 8 HOURS
Status: DISCONTINUED | OUTPATIENT
Start: 2022-09-11 | End: 2022-09-11

## 2022-09-11 RX ORDER — ROCURONIUM BROMIDE 10 MG/ML
INJECTION, SOLUTION INTRAVENOUS AS NEEDED
Status: DISCONTINUED | OUTPATIENT
Start: 2022-09-11 | End: 2022-09-11

## 2022-09-11 RX ORDER — LABETALOL HYDROCHLORIDE 5 MG/ML
10 INJECTION, SOLUTION INTRAVENOUS EVERY 4 HOURS PRN
Status: DISCONTINUED | OUTPATIENT
Start: 2022-09-11 | End: 2022-09-15

## 2022-09-11 RX ORDER — FENTANYL CITRATE 50 UG/ML
INJECTION, SOLUTION INTRAMUSCULAR; INTRAVENOUS AS NEEDED
Status: DISCONTINUED | OUTPATIENT
Start: 2022-09-11 | End: 2022-09-11

## 2022-09-11 RX ORDER — GABAPENTIN 300 MG/1
300 CAPSULE ORAL 3 TIMES DAILY
Status: DISCONTINUED | OUTPATIENT
Start: 2022-09-11 | End: 2022-09-11

## 2022-09-11 RX ORDER — ALBUTEROL SULFATE 2.5 MG/3ML
2.5 SOLUTION RESPIRATORY (INHALATION) EVERY 4 HOURS PRN
Status: CANCELLED | OUTPATIENT
Start: 2022-09-11

## 2022-09-11 RX ORDER — METHOCARBAMOL 100 MG/ML
1000 INJECTION, SOLUTION INTRAMUSCULAR; INTRAVENOUS EVERY 8 HOURS SCHEDULED
Status: DISCONTINUED | OUTPATIENT
Start: 2022-09-11 | End: 2022-09-13

## 2022-09-11 RX ORDER — MAGNESIUM HYDROXIDE 1200 MG/15ML
LIQUID ORAL AS NEEDED
Status: DISCONTINUED | OUTPATIENT
Start: 2022-09-11 | End: 2022-09-11 | Stop reason: HOSPADM

## 2022-09-11 RX ORDER — SODIUM CHLORIDE, SODIUM LACTATE, POTASSIUM CHLORIDE, CALCIUM CHLORIDE 600; 310; 30; 20 MG/100ML; MG/100ML; MG/100ML; MG/100ML
125 INJECTION, SOLUTION INTRAVENOUS CONTINUOUS
Status: DISCONTINUED | OUTPATIENT
Start: 2022-09-11 | End: 2022-09-12

## 2022-09-11 RX ORDER — ALBUMIN, HUMAN INJ 5% 5 %
SOLUTION INTRAVENOUS CONTINUOUS PRN
Status: DISCONTINUED | OUTPATIENT
Start: 2022-09-11 | End: 2022-09-11

## 2022-09-11 RX ORDER — HYDROMORPHONE HCL/PF 1 MG/ML
0.5 SYRINGE (ML) INJECTION
Status: CANCELLED | OUTPATIENT
Start: 2022-09-11

## 2022-09-11 RX ORDER — HYDROMORPHONE HCL/PF 1 MG/ML
1 SYRINGE (ML) INJECTION
Status: DISCONTINUED | OUTPATIENT
Start: 2022-09-11 | End: 2022-09-15

## 2022-09-11 RX ORDER — METHOCARBAMOL 500 MG/1
1000 TABLET, FILM COATED ORAL EVERY 6 HOURS SCHEDULED
Status: DISCONTINUED | OUTPATIENT
Start: 2022-09-11 | End: 2022-09-11

## 2022-09-11 RX ORDER — SODIUM CHLORIDE, SODIUM LACTATE, POTASSIUM CHLORIDE, CALCIUM CHLORIDE 600; 310; 30; 20 MG/100ML; MG/100ML; MG/100ML; MG/100ML
125 INJECTION, SOLUTION INTRAVENOUS CONTINUOUS
Status: DISCONTINUED | OUTPATIENT
Start: 2022-09-11 | End: 2022-09-11

## 2022-09-11 RX ORDER — LIDOCAINE 50 MG/G
2 PATCH TOPICAL DAILY
Status: DISCONTINUED | OUTPATIENT
Start: 2022-09-11 | End: 2022-09-16 | Stop reason: HOSPADM

## 2022-09-11 RX ORDER — LABETALOL HYDROCHLORIDE 5 MG/ML
10 INJECTION, SOLUTION INTRAVENOUS
Status: DISCONTINUED | OUTPATIENT
Start: 2022-09-11 | End: 2022-09-11 | Stop reason: HOSPADM

## 2022-09-11 RX ORDER — ONDANSETRON 2 MG/ML
INJECTION INTRAMUSCULAR; INTRAVENOUS AS NEEDED
Status: DISCONTINUED | OUTPATIENT
Start: 2022-09-11 | End: 2022-09-11

## 2022-09-11 RX ORDER — FENTANYL CITRATE/PF 50 MCG/ML
25 SYRINGE (ML) INJECTION
Status: DISCONTINUED | OUTPATIENT
Start: 2022-09-11 | End: 2022-09-11 | Stop reason: HOSPADM

## 2022-09-11 RX ORDER — HYDROMORPHONE HCL/PF 1 MG/ML
0.5 SYRINGE (ML) INJECTION
Status: DISCONTINUED | OUTPATIENT
Start: 2022-09-11 | End: 2022-09-11 | Stop reason: HOSPADM

## 2022-09-11 RX ORDER — HYDROMORPHONE HCL/PF 1 MG/ML
1 SYRINGE (ML) INJECTION ONCE
Status: COMPLETED | OUTPATIENT
Start: 2022-09-11 | End: 2022-09-11

## 2022-09-11 RX ORDER — MAGNESIUM SULFATE HEPTAHYDRATE 40 MG/ML
2 INJECTION, SOLUTION INTRAVENOUS ONCE
Status: COMPLETED | OUTPATIENT
Start: 2022-09-11 | End: 2022-09-12

## 2022-09-11 RX ORDER — ONDANSETRON 2 MG/ML
4 INJECTION INTRAMUSCULAR; INTRAVENOUS EVERY 4 HOURS PRN
Status: DISCONTINUED | OUTPATIENT
Start: 2022-09-11 | End: 2022-09-16 | Stop reason: HOSPADM

## 2022-09-11 RX ORDER — FENTANYL CITRATE/PF 50 MCG/ML
25 SYRINGE (ML) INJECTION
Status: CANCELLED | OUTPATIENT
Start: 2022-09-11

## 2022-09-11 RX ORDER — HYDRALAZINE HYDROCHLORIDE 20 MG/ML
10 INJECTION INTRAMUSCULAR; INTRAVENOUS EVERY 4 HOURS PRN
Status: DISCONTINUED | OUTPATIENT
Start: 2022-09-11 | End: 2022-09-15

## 2022-09-11 RX ORDER — EPHEDRINE SULFATE 50 MG/ML
INJECTION INTRAVENOUS AS NEEDED
Status: DISCONTINUED | OUTPATIENT
Start: 2022-09-11 | End: 2022-09-11

## 2022-09-11 RX ORDER — ONDANSETRON 2 MG/ML
4 INJECTION INTRAMUSCULAR; INTRAVENOUS ONCE AS NEEDED
Status: CANCELLED | OUTPATIENT
Start: 2022-09-11

## 2022-09-11 RX ORDER — SODIUM CHLORIDE 9 MG/ML
INJECTION, SOLUTION INTRAVENOUS CONTINUOUS PRN
Status: DISCONTINUED | OUTPATIENT
Start: 2022-09-11 | End: 2022-09-11

## 2022-09-11 RX ORDER — CEFAZOLIN SODIUM 2 G/50ML
2000 SOLUTION INTRAVENOUS ONCE
Status: DISCONTINUED | OUTPATIENT
Start: 2022-09-11 | End: 2022-09-11

## 2022-09-11 RX ORDER — HYDROMORPHONE HCL/PF 1 MG/ML
SYRINGE (ML) INJECTION AS NEEDED
Status: DISCONTINUED | OUTPATIENT
Start: 2022-09-11 | End: 2022-09-11

## 2022-09-11 RX ORDER — OXYCODONE HYDROCHLORIDE 5 MG/1
5 TABLET ORAL EVERY 4 HOURS PRN
Status: DISCONTINUED | OUTPATIENT
Start: 2022-09-11 | End: 2022-09-11

## 2022-09-11 RX ORDER — CEFAZOLIN SODIUM 2 G/50ML
2000 SOLUTION INTRAVENOUS EVERY 8 HOURS
Status: COMPLETED | OUTPATIENT
Start: 2022-09-11 | End: 2022-09-11

## 2022-09-11 RX ADMIN — GABAPENTIN 300 MG: 250 SOLUTION ORAL at 23:24

## 2022-09-11 RX ADMIN — SODIUM CHLORIDE, SODIUM LACTATE, POTASSIUM CHLORIDE, AND CALCIUM CHLORIDE 100 ML/HR: .6; .31; .03; .02 INJECTION, SOLUTION INTRAVENOUS at 07:46

## 2022-09-11 RX ADMIN — Medication 100 MCG: at 17:28

## 2022-09-11 RX ADMIN — FENTANYL CITRATE 50 MCG: 50 INJECTION INTRAMUSCULAR; INTRAVENOUS at 14:25

## 2022-09-11 RX ADMIN — HYDROMORPHONE HYDROCHLORIDE 0.5 MG: 1 INJECTION, SOLUTION INTRAMUSCULAR; INTRAVENOUS; SUBCUTANEOUS at 17:19

## 2022-09-11 RX ADMIN — HYDROMORPHONE HYDROCHLORIDE 0.5 MG: 1 INJECTION, SOLUTION INTRAMUSCULAR; INTRAVENOUS; SUBCUTANEOUS at 19:00

## 2022-09-11 RX ADMIN — LIDOCAINE 5% 1 PATCH: 700 PATCH TOPICAL at 09:02

## 2022-09-11 RX ADMIN — METRONIDAZOLE: 500 INJECTION, SOLUTION INTRAVENOUS at 14:30

## 2022-09-11 RX ADMIN — EPHEDRINE SULFATE 5 MG: 50 INJECTION INTRAVENOUS at 14:40

## 2022-09-11 RX ADMIN — SODIUM CHLORIDE: 9 INJECTION INTRAMUSCULAR; INTRAVENOUS; SUBCUTANEOUS at 19:50

## 2022-09-11 RX ADMIN — MIDAZOLAM 2 MG: 1 INJECTION INTRAMUSCULAR; INTRAVENOUS at 14:14

## 2022-09-11 RX ADMIN — HYDROMORPHONE HYDROCHLORIDE 0.5 MG: 1 INJECTION, SOLUTION INTRAMUSCULAR; INTRAVENOUS; SUBCUTANEOUS at 19:25

## 2022-09-11 RX ADMIN — LIDOCAINE 5% 2 PATCH: 700 PATCH TOPICAL at 21:20

## 2022-09-11 RX ADMIN — GABAPENTIN 100 MG: 100 CAPSULE ORAL at 08:41

## 2022-09-11 RX ADMIN — Medication 25 MCG: at 19:10

## 2022-09-11 RX ADMIN — CEFAZOLIN SODIUM 2000 MG: 2 SOLUTION INTRAVENOUS at 09:33

## 2022-09-11 RX ADMIN — MAGNESIUM SULFATE HEPTAHYDRATE 2 G: 40 INJECTION, SOLUTION INTRAVENOUS at 23:29

## 2022-09-11 RX ADMIN — PROPOFOL 110 MG: 10 INJECTION, EMULSION INTRAVENOUS at 14:25

## 2022-09-11 RX ADMIN — HYDROMORPHONE HYDROCHLORIDE 0.5 MG: 1 INJECTION, SOLUTION INTRAMUSCULAR; INTRAVENOUS; SUBCUTANEOUS at 12:30

## 2022-09-11 RX ADMIN — METRONIDAZOLE 500 MG: 500 INJECTION, SOLUTION INTRAVENOUS at 10:41

## 2022-09-11 RX ADMIN — OXYCODONE HYDROCHLORIDE 5 MG: 5 TABLET ORAL at 08:41

## 2022-09-11 RX ADMIN — GLYCOPYRROLATE 0.4 MG: 0.2 INJECTION, SOLUTION INTRAMUSCULAR; INTRAVENOUS at 18:45

## 2022-09-11 RX ADMIN — DEXAMETHASONE SODIUM PHOSPHATE 10 MG: 10 INJECTION, SOLUTION INTRAMUSCULAR; INTRAVENOUS at 14:25

## 2022-09-11 RX ADMIN — ONDANSETRON 4 MG: 2 INJECTION INTRAMUSCULAR; INTRAVENOUS at 14:25

## 2022-09-11 RX ADMIN — ACETAMINOPHEN 975 MG: 325 TABLET ORAL at 07:28

## 2022-09-11 RX ADMIN — ALBUMIN (HUMAN): 12.5 INJECTION, SOLUTION INTRAVENOUS at 15:38

## 2022-09-11 RX ADMIN — ACETAMINOPHEN 975 MG: 650 SUSPENSION ORAL at 23:25

## 2022-09-11 RX ADMIN — ROCURONIUM BROMIDE 10 MG: 50 INJECTION INTRAVENOUS at 17:49

## 2022-09-11 RX ADMIN — CEFAZOLIN 1000 MG: 1 INJECTION, POWDER, FOR SOLUTION INTRAMUSCULAR; INTRAVENOUS at 14:25

## 2022-09-11 RX ADMIN — METHOCARBAMOL 1000 MG: 100 INJECTION INTRAMUSCULAR; INTRAVENOUS at 21:20

## 2022-09-11 RX ADMIN — CEFAZOLIN 1000 MG: 1 INJECTION, POWDER, FOR SOLUTION INTRAMUSCULAR; INTRAVENOUS at 18:15

## 2022-09-11 RX ADMIN — LIDOCAINE HYDROCHLORIDE 50 MG: 10 INJECTION, SOLUTION EPIDURAL; INFILTRATION; INTRACAUDAL; PERINEURAL at 14:25

## 2022-09-11 RX ADMIN — ONDANSETRON 4 MG: 2 INJECTION INTRAMUSCULAR; INTRAVENOUS at 02:32

## 2022-09-11 RX ADMIN — SODIUM CHLORIDE: 0.9 INJECTION, SOLUTION INTRAVENOUS at 17:43

## 2022-09-11 RX ADMIN — ALBUMIN (HUMAN): 12.5 INJECTION, SOLUTION INTRAVENOUS at 17:48

## 2022-09-11 RX ADMIN — HYDROMORPHONE HYDROCHLORIDE 1 MG: 1 INJECTION, SOLUTION INTRAMUSCULAR; INTRAVENOUS; SUBCUTANEOUS at 09:31

## 2022-09-11 RX ADMIN — SODIUM CHLORIDE, SODIUM LACTATE, POTASSIUM CHLORIDE, AND CALCIUM CHLORIDE 100 ML/HR: .6; .31; .03; .02 INJECTION, SOLUTION INTRAVENOUS at 09:01

## 2022-09-11 RX ADMIN — ROCURONIUM BROMIDE 10 MG: 50 INJECTION INTRAVENOUS at 15:58

## 2022-09-11 RX ADMIN — ALBUMIN (HUMAN): 12.5 INJECTION, SOLUTION INTRAVENOUS at 17:24

## 2022-09-11 RX ADMIN — SODIUM CHLORIDE: 0.9 INJECTION, SOLUTION INTRAVENOUS at 15:41

## 2022-09-11 RX ADMIN — GABAPENTIN 100 MG: 100 CAPSULE ORAL at 02:32

## 2022-09-11 RX ADMIN — SODIUM CHLORIDE, SODIUM LACTATE, POTASSIUM CHLORIDE, AND CALCIUM CHLORIDE: .6; .31; .03; .02 INJECTION, SOLUTION INTRAVENOUS at 14:07

## 2022-09-11 RX ADMIN — BUPIVACAINE HYDROCHLORIDE 20 ML: 2.5 INJECTION, SOLUTION EPIDURAL; INFILTRATION; INTRACAUDAL at 18:42

## 2022-09-11 RX ADMIN — Medication 25 MCG: at 19:05

## 2022-09-11 RX ADMIN — NEOSTIGMINE METHYLSULFATE 3 MG: 1 INJECTION INTRAVENOUS at 18:45

## 2022-09-11 RX ADMIN — ROCURONIUM BROMIDE 50 MG: 50 INJECTION INTRAVENOUS at 14:25

## 2022-09-11 RX ADMIN — Medication 100 MCG: at 17:25

## 2022-09-11 RX ADMIN — Medication 25 MCG: at 19:24

## 2022-09-11 RX ADMIN — Medication 25 MCG: at 19:19

## 2022-09-11 RX ADMIN — BUPIVACAINE HYDROCHLORIDE 20 ML: 2.5 INJECTION, SOLUTION EPIDURAL; INFILTRATION; INTRACAUDAL at 18:45

## 2022-09-11 RX ADMIN — EPHEDRINE SULFATE 5 MG: 50 INJECTION INTRAVENOUS at 18:04

## 2022-09-11 RX ADMIN — SODIUM CHLORIDE: 0.9 INJECTION, SOLUTION INTRAVENOUS at 14:28

## 2022-09-11 RX ADMIN — FENTANYL CITRATE 50 MCG: 50 INJECTION INTRAMUSCULAR; INTRAVENOUS at 14:17

## 2022-09-11 RX ADMIN — METHOCARBAMOL 500 MG: 500 TABLET ORAL at 07:28
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
## 2022-09-11 NOTE — QUICK NOTE
Surgery  Quick note    Pt seen and examined for serial abdominal exam  He abdomen has remained stable throughout the course of the night  Soft, mild tender, non distended  No rebound guarding or peritonitis       Iraida Dasilva MD  4:03 PM  9/11/22
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
## 2022-09-11 NOTE — EMTALA/ACUTE CARE TRANSFER
[address] [facility] [address]  Dept: [phone]      EMTALA TRANSFER CONSENT    NAME Shirley Winters                                         1980                              MRN [id number]    I have been informed of my rights regarding examination, treatment, and transfer   by Dr Yanique Tolentino MD    Benefits: Specialized equipment and/or services available at the receiving facility (Include comment)________________________, Continuity of care, Patient preference    Risks: Potential for delay in receiving treatment, Potential deterioration of medical condition, Loss of IV, Increased discomfort during transfer, Possible worsening of condition or death during transfer      Transfer Request   I acknowledge that my medical condition has been evaluated and explained to me by the emergency department physician or other qualified medical person and/or my attending physician who has recommended and offered to me further medical examination and treatment  I understand the Hospital's obligation with respect to the treatment and stabilization of my emergency medical condition  I nevertheless request to be transferred  I release the Hospital, the doctor, and any other persons caring for me from all responsibility or liability for any injury or ill effects that may result from my transfer and agree to accept all responsibility for the consequences of my choice to transfer, rather than receive stabilizing treatment at the Hospital  I understand that because the transfer is my request, my insurance may not provide reimbursement for the services  The Hospital will assist and direct me and my family in how to make arrangements for transfer, but the hospital is not liable for any fees charged by the transport service    In spite of this understanding, I refuse to consent to further medical examination and treatment which has been offered to me, and request transfer to [address] [facility]  I authorize the performance of emergency medical procedures and treatments upon me in both transit and upon arrival at the receiving facility  Additionally, I authorize the release of any and all medical records to the receiving facility and request they be transported with me, if possible  I authorize the performance of emergency medical procedures and treatments upon me in both transit and upon arrival at the receiving facility  Additionally, I authorize the release of any and all medical records to the receiving facility and request they be transported with me, if possible  I understand that the safest mode of transportation during a medical emergency is an ambulance and that the Hospital advocates the use of this mode of transport  Risks of traveling to the receiving facility by car, including absence of medical control, life sustaining equipment, such as oxygen, and medical personnel has been explained to me and I fully understand them  (ERLINDA CORRECT BOX BELOW)  [  ]  I consent to the stated transfer and to be transported by ambulance/helicopter  [  ]  I consent to the stated transfer, but refuse transportation by ambulance and accept full responsibility for my transportation by car  I understand the risks of non-ambulance transfers and I exonerate the Hospital and its staff from any deterioration in my condition that results from this refusal     X___________________________________________    DATE  09/10/22  TIME________  Signature of patient or legally responsible individual signing on patient behalf           RELATIONSHIP TO PATIENT_________________________          Provider Certification    NAME Leslie Walsh                                         1980                              MRN [id number]    A medical screening exam was performed on the above named patient    Based on the examination:    Condition Necessitating Transfer The encounter diagnosis was Abdominal pain  Patient Condition: The patient has been stabilized such that within reasonable medical probability, no material deterioration of the patient condition or the condition of the unborn child(lamin) is likely to result from the transfer, An emergency transfer is being made prior to stabilization due to the need for definitive care and the benefit of transfer outweighs the risk    Reason for Transfer: Level of Care needed not available at this facility, Patient/Family request    Transfer Requirements: Facility [facility]   · Space available and qualified personnel available for treatment as acknowledged by    · Agreed to accept transfer and to provide appropriate medical treatment as acknowledged by       Dr Abhishek Hubbard  · Appropriate medical records of the examination and treatment of the patient are provided at the time of transfer   [address] [facility], [address] _______  · Transfer will be performed by qualified personnel from    and appropriate transfer equipment as required, including the use of necessary and appropriate life support measures  Provider Certification: I have examined the patient and explained the following risks and benefits of being transferred/refusing transfer to the patient/family:         Based on these reasonable risks and benefits to the patient and/or the unborn child(lamin), and based upon the information available at the time of the patients examination, I certify that the medical benefits reasonably to be expected from the provision of appropriate medical treatments at another medical facility outweigh the increasing risks, if any, to the individuals medical condition, and in the case of labor to the unborn child, from effecting the transfer      X____________________________________________ DATE 09/10/22        TIME_______      ORIGINAL - SEND TO MEDICAL RECORDS   COPY - SEND WITH PATIENT DURING TRANSFER

## 2022-09-11 NOTE — ANESTHESIA PROCEDURE NOTES
Arterial Line Insertion  Performed by: Alona Coy CRNA  Authorized by: Khushi Dowell MD   Consent: Written consent obtained  Risks and benefits: risks, benefits and alternatives were discussed  Consent given by: patient  Patient understanding: patient states understanding of the procedure being performed  Patient consent: the patient's understanding of the procedure matches consent given  Procedure consent: procedure consent matches procedure scheduled  Relevant documents: relevant documents present and verified  Test results: test results available and properly labeled  Site marked: the operative site was marked  Radiology Images: Radiology Images displayed and confirmed  If images not available, report reviewed  Patient identity confirmed: arm band  Time out: Immediately prior to procedure a "time out" was called to verify the correct patient, procedure, equipment, support staff and site/side marked as required    Indications: multiple ABGs and hemodynamic monitoring  Orientation:  Left  Location: radial artery  Procedure Details:  Needle gauge: 20  Number of attempts: 2    Post-procedure:  Post-procedure: dressing applied
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
## 2022-09-11 NOTE — ANESTHESIA POSTPROCEDURE EVALUATION
Post-Op Assessment Note    CV Status:  Stable    Pain management: adequate     Mental Status:  Sleepy and arousable   Hydration Status:  Euvolemic   PONV Controlled:  Controlled   Airway Patency:  Patent      Post Op Vitals Reviewed: Yes      Staff: Anesthesiologist, CRNA         No complications documented      /54 (09/11/22 1857)    Temp   97 0   Pulse (!) 114 (09/11/22 1857)   Resp 16 (09/11/22 1857)    SpO2 100 % (09/11/22 1857)

## 2022-09-12 PROBLEM — S36.209A PANCREATIC INJURY, INITIAL ENCOUNTER: Status: ACTIVE | Noted: 2022-09-12

## 2022-09-12 LAB
ALBUMIN SERPL BCP-MCNC: 3.3 G/DL (ref 3.5–5)
ALP SERPL-CCNC: 40 U/L (ref 46–116)
ALT SERPL W P-5'-P-CCNC: 44 U/L (ref 12–78)
ANION GAP SERPL CALCULATED.3IONS-SCNC: 3 MMOL/L (ref 4–13)
AST SERPL W P-5'-P-CCNC: 41 U/L (ref 5–45)
BASE EXCESS BLDA CALC-SCNC: -7 MMOL/L (ref -2–3)
BASOPHILS # BLD AUTO: 0.01 THOUSANDS/ÂΜL (ref 0–0.1)
BASOPHILS NFR BLD AUTO: 0 % (ref 0–1)
BILIRUB DIRECT SERPL-MCNC: 0.27 MG/DL (ref 0–0.2)
BILIRUB SERPL-MCNC: 1.69 MG/DL (ref 0.2–1)
BUN SERPL-MCNC: 9 MG/DL (ref 5–25)
CA-I BLD-SCNC: 1.06 MMOL/L (ref 1.12–1.32)
CALCIUM SERPL-MCNC: 8.1 MG/DL (ref 8.3–10.1)
CHLORIDE SERPL-SCNC: 110 MMOL/L (ref 96–108)
CO2 SERPL-SCNC: 24 MMOL/L (ref 21–32)
CREAT SERPL-MCNC: 0.83 MG/DL (ref 0.6–1.3)
EOSINOPHIL # BLD AUTO: 0 THOUSAND/ÂΜL (ref 0–0.61)
EOSINOPHIL NFR BLD AUTO: 0 % (ref 0–6)
ERYTHROCYTE [DISTWIDTH] IN BLOOD BY AUTOMATED COUNT: 13.2 % (ref 11.6–15.1)
EST. AVERAGE GLUCOSE BLD GHB EST-MCNC: 111 MG/DL
GFR SERPL CREATININE-BSD FRML MDRD: 86 ML/MIN/1.73SQ M
GLUCOSE SERPL-MCNC: 120 MG/DL (ref 65–140)
GLUCOSE SERPL-MCNC: 140 MG/DL (ref 65–140)
GLUCOSE SERPL-MCNC: 192 MG/DL (ref 65–140)
GLUCOSE SERPL-MCNC: 197 MG/DL (ref 65–140)
GLUCOSE SERPL-MCNC: 200 MG/DL (ref 65–140)
GLUCOSE SERPL-MCNC: 244 MG/DL (ref 65–140)
HBA1C MFR BLD: 5.5 %
HCO3 BLDA-SCNC: 19.1 MMOL/L (ref 24–30)
HCT VFR BLD AUTO: 35.9 % (ref 34.8–46.1)
HCT VFR BLD CALC: 32 % (ref 34.8–46.1)
HGB BLD-MCNC: 11.3 G/DL (ref 11.5–15.4)
HGB BLDA-MCNC: 10.9 G/DL (ref 11.5–15.4)
IMM GRANULOCYTES # BLD AUTO: 0.06 THOUSAND/UL (ref 0–0.2)
IMM GRANULOCYTES NFR BLD AUTO: 0 % (ref 0–2)
LYMPHOCYTES # BLD AUTO: 0.61 THOUSANDS/ÂΜL (ref 0.6–4.47)
LYMPHOCYTES NFR BLD AUTO: 4 % (ref 14–44)
MCH RBC QN AUTO: 31 PG (ref 26.8–34.3)
MCHC RBC AUTO-ENTMCNC: 31.5 G/DL (ref 31.4–37.4)
MCV RBC AUTO: 98 FL (ref 82–98)
MONOCYTES # BLD AUTO: 1.2 THOUSAND/ÂΜL (ref 0.17–1.22)
MONOCYTES NFR BLD AUTO: 8 % (ref 4–12)
NEUTROPHILS # BLD AUTO: 12.46 THOUSANDS/ÂΜL (ref 1.85–7.62)
NEUTS SEG NFR BLD AUTO: 88 % (ref 43–75)
NRBC BLD AUTO-RTO: 0 /100 WBCS
PCO2 BLD: 20 MMOL/L (ref 21–32)
PCO2 BLD: 39 MM HG (ref 42–50)
PH BLD: 7.3 [PH] (ref 7.3–7.4)
PLATELET # BLD AUTO: 240 THOUSANDS/UL (ref 149–390)
PMV BLD AUTO: 9.9 FL (ref 8.9–12.7)
PO2 BLD: 233 MM HG (ref 35–45)
POTASSIUM BLD-SCNC: 3.7 MMOL/L (ref 3.5–5.3)
POTASSIUM SERPL-SCNC: 3.8 MMOL/L (ref 3.5–5.3)
PROT SERPL-MCNC: 6.2 G/DL (ref 6.4–8.4)
RBC # BLD AUTO: 3.65 MILLION/UL (ref 3.81–5.12)
SAO2 % BLD FROM PO2: 100 % (ref 60–85)
SODIUM BLD-SCNC: 140 MMOL/L (ref 136–145)
SODIUM SERPL-SCNC: 137 MMOL/L (ref 135–147)
SPECIMEN SOURCE: ABNORMAL
WBC # BLD AUTO: 14.34 THOUSAND/UL (ref 4.31–10.16)

## 2022-09-12 PROCEDURE — 99024 POSTOP FOLLOW-UP VISIT: CPT | Performed by: SURGERY

## 2022-09-12 PROCEDURE — 85025 COMPLETE CBC W/AUTO DIFF WBC: CPT | Performed by: SURGERY

## 2022-09-12 PROCEDURE — 97166 OT EVAL MOD COMPLEX 45 MIN: CPT

## 2022-09-12 PROCEDURE — 97163 PT EVAL HIGH COMPLEX 45 MIN: CPT

## 2022-09-12 PROCEDURE — 80076 HEPATIC FUNCTION PANEL: CPT | Performed by: SURGERY

## 2022-09-12 PROCEDURE — 99223 1ST HOSP IP/OBS HIGH 75: CPT | Performed by: INTERNAL MEDICINE

## 2022-09-12 PROCEDURE — 99024 POSTOP FOLLOW-UP VISIT: CPT | Performed by: EMERGENCY MEDICINE

## 2022-09-12 PROCEDURE — 99232 SBSQ HOSP IP/OBS MODERATE 35: CPT | Performed by: STUDENT IN AN ORGANIZED HEALTH CARE EDUCATION/TRAINING PROGRAM

## 2022-09-12 PROCEDURE — 80048 BASIC METABOLIC PNL TOTAL CA: CPT | Performed by: SURGERY

## 2022-09-12 PROCEDURE — 83036 HEMOGLOBIN GLYCOSYLATED A1C: CPT | Performed by: STUDENT IN AN ORGANIZED HEALTH CARE EDUCATION/TRAINING PROGRAM

## 2022-09-12 PROCEDURE — 82948 REAGENT STRIP/BLOOD GLUCOSE: CPT

## 2022-09-12 RX ORDER — INSULIN GLARGINE 100 [IU]/ML
5 INJECTION, SOLUTION SUBCUTANEOUS
Status: DISCONTINUED | OUTPATIENT
Start: 2022-09-12 | End: 2022-09-15

## 2022-09-12 RX ORDER — INSULIN LISPRO 100 [IU]/ML
1-5 INJECTION, SOLUTION INTRAVENOUS; SUBCUTANEOUS
Status: DISCONTINUED | OUTPATIENT
Start: 2022-09-12 | End: 2022-09-15

## 2022-09-12 RX ORDER — DEXTROSE, SODIUM CHLORIDE, AND POTASSIUM CHLORIDE 5; .45; .15 G/100ML; G/100ML; G/100ML
100 INJECTION INTRAVENOUS CONTINUOUS
Status: DISCONTINUED | OUTPATIENT
Start: 2022-09-12 | End: 2022-09-13

## 2022-09-12 RX ADMIN — INSULIN LISPRO 1 UNITS: 100 INJECTION, SOLUTION INTRAVENOUS; SUBCUTANEOUS at 18:04

## 2022-09-12 RX ADMIN — DEXTROSE, SODIUM CHLORIDE, AND POTASSIUM CHLORIDE 100 ML/HR: 5; .45; .15 INJECTION INTRAVENOUS at 09:20

## 2022-09-12 RX ADMIN — METHOCARBAMOL 1000 MG: 100 INJECTION INTRAMUSCULAR; INTRAVENOUS at 15:34

## 2022-09-12 RX ADMIN — METHOCARBAMOL 1000 MG: 100 INJECTION INTRAMUSCULAR; INTRAVENOUS at 22:32

## 2022-09-12 RX ADMIN — ACETAMINOPHEN 975 MG: 650 SUSPENSION ORAL at 22:32

## 2022-09-12 RX ADMIN — POTASSIUM PHOSPHATE, MONOBASIC AND POTASSIUM PHOSPHATE, DIBASIC 9 MMOL: 224; 236 INJECTION, SOLUTION, CONCENTRATE INTRAVENOUS at 01:35

## 2022-09-12 RX ADMIN — INSULIN GLARGINE 5 UNITS: 100 INJECTION, SOLUTION SUBCUTANEOUS at 22:41

## 2022-09-12 RX ADMIN — LIDOCAINE 5% 2 PATCH: 700 PATCH TOPICAL at 09:20

## 2022-09-12 RX ADMIN — ACETAMINOPHEN 975 MG: 650 SUSPENSION ORAL at 15:38

## 2022-09-12 RX ADMIN — GABAPENTIN 300 MG: 250 SOLUTION ORAL at 22:32

## 2022-09-12 RX ADMIN — METHOCARBAMOL 1000 MG: 100 INJECTION INTRAMUSCULAR; INTRAVENOUS at 05:18

## 2022-09-12 RX ADMIN — DEXTROSE, SODIUM CHLORIDE, AND POTASSIUM CHLORIDE 100 ML/HR: 5; .45; .15 INJECTION INTRAVENOUS at 19:34

## 2022-09-12 RX ADMIN — ACETAMINOPHEN 975 MG: 650 SUSPENSION ORAL at 05:24
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
## 2022-09-12 NOTE — QUICK NOTE
Post Op Check:    Patient is s/p laparoscopic diagnostic converted to open splenectomy and distal pancreatectomy  She states pain is well controlled  Currently using the PCA for adequate pain analgesia  Currently denies any nausea, chest pain, or shortness of breath  Denies vomiting, denies having bowel movements, and denies passing flatus  MAIN drain producing ss output  Weber producing light yellow urine  Incisions appear c/d/i  NG tube with bilious output  Weber and NG tube in place  General: NAD  HENT: NCAT MMM  Neck: supple, no JVD  CV: nl rate  Lungs: nl wob  No resp distress  ABD: Soft, distended, tender in the RUQ and LUQ     Extrem: No CCE  Neuro: AAOx3     Plan:  Diet NPO; Sips with meds  Continue to monitor  Pain and nausea control PRN    Mable Rivas MD  General Surgery Resident

## 2022-09-12 NOTE — ANESTHESIA PROCEDURE NOTES
Peripheral Block    Patient location during procedure: holding area  Start time: 9/11/2022 6:45 PM  Reason for block: procedure for pain, at surgeon's request and post-op pain management  Staffing  Performed: Anesthesiologist   Anesthesiologist: Brandon Colin MD  Preanesthetic Checklist  Completed: patient identified, IV checked, site marked, risks and benefits discussed, surgical consent, monitors and equipment checked, pre-op evaluation and timeout performed  Peripheral Block  Patient position: supine  Prep: ChloraPrep  Patient monitoring: continuous pulse ox and frequent blood pressure checks  Anesthesia block type: Rectus Sheath  Laterality: bilateral  Injection technique: single-shot  Procedures: ultrasound guided, Ultrasound guidance required for the procedure to increase accuracy and safety of medication placement and decrease risk of complications    Ultrasound permanent image saved  Needle  Needle type: Stimuplex   Needle gauge: 22 G  Needle length: 10 cm  Needle localization: anatomical landmarks and ultrasound guidance  Test dose: negative  Assessment  Injection assessment: incremental injection, local visualized surrounding nerve on ultrasound, negative aspiration for CSF, negative aspiration for heme, transient paresthesias and no paresthesia on injection  Paresthesia pain: none  Heart rate change: no  Slow fractionated injection: yes  Post-procedure:  site cleaned  patient tolerated the procedure well with no immediate complications  Additional Notes  Single needle pass, needle visualized throughout, minimal resistance on injection, appropriate lifting of rectus sheath off fascia
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
## 2022-09-12 NOTE — QUICK NOTE
Postoperative Assessment  Patient reports continuing to have some abdominal pain but reports improvement compared to prior to the operation  She denies chest pain, shortness of breath, nausea, or vomiting      Patient awake and alert, no acute distress, answering questions appropriately  Regular rate and rhythm, no murmurs  Clear to auscultation bilaterally  Abdomen with mild generalized tenderness, soft nondistended, no rebound or guarding    Vitals:    09/11/22 2239   BP: 99/56   Pulse: 74   Resp: 15   Temp: 97 9 °F (36 6 °C)   SpO2: 95%
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
## 2022-09-13 LAB
ABO GROUP BLD BPU: NORMAL
ABO GROUP BLD BPU: NORMAL
ANION GAP SERPL CALCULATED.3IONS-SCNC: 3 MMOL/L (ref 4–13)
BASOPHILS # BLD AUTO: 0.02 THOUSANDS/ÂΜL (ref 0–0.1)
BASOPHILS NFR BLD AUTO: 0 % (ref 0–1)
BPU ID: NORMAL
BPU ID: NORMAL
BUN SERPL-MCNC: 7 MG/DL (ref 5–25)
CALCIUM SERPL-MCNC: 8.1 MG/DL (ref 8.3–10.1)
CHLORIDE SERPL-SCNC: 109 MMOL/L (ref 96–108)
CO2 SERPL-SCNC: 27 MMOL/L (ref 21–32)
CREAT SERPL-MCNC: 0.58 MG/DL (ref 0.6–1.3)
CROSSMATCH: NORMAL
CROSSMATCH: NORMAL
EOSINOPHIL # BLD AUTO: 0.02 THOUSAND/ÂΜL (ref 0–0.61)
EOSINOPHIL NFR BLD AUTO: 0 % (ref 0–6)
ERYTHROCYTE [DISTWIDTH] IN BLOOD BY AUTOMATED COUNT: 13.5 % (ref 11.6–15.1)
GFR SERPL CREATININE-BSD FRML MDRD: 113 ML/MIN/1.73SQ M
GLUCOSE SERPL-MCNC: 127 MG/DL (ref 65–140)
GLUCOSE SERPL-MCNC: 144 MG/DL (ref 65–140)
GLUCOSE SERPL-MCNC: 157 MG/DL (ref 65–140)
GLUCOSE SERPL-MCNC: 90 MG/DL (ref 65–140)
GLUCOSE SERPL-MCNC: 98 MG/DL (ref 65–140)
HCT VFR BLD AUTO: 32.7 % (ref 34.8–46.1)
HGB BLD-MCNC: 10.4 G/DL (ref 11.5–15.4)
IMM GRANULOCYTES # BLD AUTO: 0.09 THOUSAND/UL (ref 0–0.2)
IMM GRANULOCYTES NFR BLD AUTO: 1 % (ref 0–2)
LYMPHOCYTES # BLD AUTO: 2.25 THOUSANDS/ÂΜL (ref 0.6–4.47)
LYMPHOCYTES NFR BLD AUTO: 14 % (ref 14–44)
MCH RBC QN AUTO: 31.1 PG (ref 26.8–34.3)
MCHC RBC AUTO-ENTMCNC: 31.8 G/DL (ref 31.4–37.4)
MCV RBC AUTO: 98 FL (ref 82–98)
MONOCYTES # BLD AUTO: 1.61 THOUSAND/ÂΜL (ref 0.17–1.22)
MONOCYTES NFR BLD AUTO: 10 % (ref 4–12)
NEUTROPHILS # BLD AUTO: 11.75 THOUSANDS/ÂΜL (ref 1.85–7.62)
NEUTS SEG NFR BLD AUTO: 75 % (ref 43–75)
NRBC BLD AUTO-RTO: 0 /100 WBCS
PLATELET # BLD AUTO: 242 THOUSANDS/UL (ref 149–390)
PMV BLD AUTO: 10.5 FL (ref 8.9–12.7)
POTASSIUM SERPL-SCNC: 3.7 MMOL/L (ref 3.5–5.3)
RBC # BLD AUTO: 3.34 MILLION/UL (ref 3.81–5.12)
SODIUM SERPL-SCNC: 139 MMOL/L (ref 135–147)
UNIT DISPENSE STATUS: NORMAL
UNIT DISPENSE STATUS: NORMAL
UNIT PRODUCT CODE: NORMAL
UNIT PRODUCT CODE: NORMAL
UNIT PRODUCT VOLUME: 350 ML
UNIT PRODUCT VOLUME: 350 ML
UNIT RH: NORMAL
UNIT RH: NORMAL
WBC # BLD AUTO: 15.74 THOUSAND/UL (ref 4.31–10.16)

## 2022-09-13 PROCEDURE — 80048 BASIC METABOLIC PNL TOTAL CA: CPT | Performed by: SURGERY

## 2022-09-13 PROCEDURE — 99024 POSTOP FOLLOW-UP VISIT: CPT | Performed by: EMERGENCY MEDICINE

## 2022-09-13 PROCEDURE — 82948 REAGENT STRIP/BLOOD GLUCOSE: CPT

## 2022-09-13 PROCEDURE — 85025 COMPLETE CBC W/AUTO DIFF WBC: CPT | Performed by: SURGERY

## 2022-09-13 RX ORDER — DEXTROSE, SODIUM CHLORIDE, AND POTASSIUM CHLORIDE 5; .45; .15 G/100ML; G/100ML; G/100ML
75 INJECTION INTRAVENOUS CONTINUOUS
Status: DISCONTINUED | OUTPATIENT
Start: 2022-09-13 | End: 2022-09-14

## 2022-09-13 RX ORDER — ACETAMINOPHEN 325 MG/1
975 TABLET ORAL EVERY 8 HOURS SCHEDULED
Status: DISCONTINUED | OUTPATIENT
Start: 2022-09-13 | End: 2022-09-15

## 2022-09-13 RX ORDER — OXYCODONE HYDROCHLORIDE 5 MG/1
5 TABLET ORAL EVERY 4 HOURS PRN
Status: DISCONTINUED | OUTPATIENT
Start: 2022-09-13 | End: 2022-09-15

## 2022-09-13 RX ORDER — GABAPENTIN 400 MG/1
400 CAPSULE ORAL 4 TIMES DAILY
Status: DISCONTINUED | OUTPATIENT
Start: 2022-09-13 | End: 2022-09-16 | Stop reason: HOSPADM

## 2022-09-13 RX ORDER — POTASSIUM CHLORIDE 20 MEQ/1
40 TABLET, EXTENDED RELEASE ORAL ONCE
Status: COMPLETED | OUTPATIENT
Start: 2022-09-13 | End: 2022-09-13

## 2022-09-13 RX ORDER — POLYETHYLENE GLYCOL 3350 17 G/17G
17 POWDER, FOR SOLUTION ORAL DAILY
Status: DISCONTINUED | OUTPATIENT
Start: 2022-09-13 | End: 2022-09-16 | Stop reason: HOSPADM

## 2022-09-13 RX ORDER — METHOCARBAMOL 500 MG/1
1000 TABLET, FILM COATED ORAL EVERY 6 HOURS SCHEDULED
Status: DISCONTINUED | OUTPATIENT
Start: 2022-09-13 | End: 2022-09-16 | Stop reason: HOSPADM

## 2022-09-13 RX ORDER — OXYCODONE HYDROCHLORIDE 10 MG/1
10 TABLET ORAL EVERY 4 HOURS PRN
Status: DISCONTINUED | OUTPATIENT
Start: 2022-09-13 | End: 2022-09-15

## 2022-09-13 RX ADMIN — OXYCODONE HYDROCHLORIDE 10 MG: 10 TABLET ORAL at 10:17

## 2022-09-13 RX ADMIN — ENOXAPARIN SODIUM 40 MG: 40 INJECTION SUBCUTANEOUS at 08:43

## 2022-09-13 RX ADMIN — INSULIN GLARGINE 5 UNITS: 100 INJECTION, SOLUTION SUBCUTANEOUS at 21:09

## 2022-09-13 RX ADMIN — METHOCARBAMOL TABLETS 1000 MG: 500 TABLET, COATED ORAL at 10:17

## 2022-09-13 RX ADMIN — POLYETHYLENE GLYCOL 3350 17 G: 17 POWDER, FOR SOLUTION ORAL at 10:18

## 2022-09-13 RX ADMIN — DOCUSATE SODIUM 100 MG: 100 CAPSULE, LIQUID FILLED ORAL at 17:46

## 2022-09-13 RX ADMIN — OXYCODONE HYDROCHLORIDE 10 MG: 10 TABLET ORAL at 14:48

## 2022-09-13 RX ADMIN — METHOCARBAMOL 1000 MG: 100 INJECTION INTRAMUSCULAR; INTRAVENOUS at 05:41

## 2022-09-13 RX ADMIN — ACETAMINOPHEN 975 MG: 325 TABLET ORAL at 12:17

## 2022-09-13 RX ADMIN — ACETAMINOPHEN 975 MG: 650 SUSPENSION ORAL at 05:41

## 2022-09-13 RX ADMIN — DEXTROSE, SODIUM CHLORIDE, AND POTASSIUM CHLORIDE 75 ML/HR: 5; .45; .15 INJECTION INTRAVENOUS at 17:51

## 2022-09-13 RX ADMIN — OXYCODONE HYDROCHLORIDE 10 MG: 10 TABLET ORAL at 18:41

## 2022-09-13 RX ADMIN — DEXTROSE, SODIUM CHLORIDE, AND POTASSIUM CHLORIDE 100 ML/HR: 5; .45; .15 INJECTION INTRAVENOUS at 05:49

## 2022-09-13 RX ADMIN — INSULIN LISPRO 1 UNITS: 100 INJECTION, SOLUTION INTRAVENOUS; SUBCUTANEOUS at 08:34

## 2022-09-13 RX ADMIN — GABAPENTIN 400 MG: 400 CAPSULE ORAL at 21:09

## 2022-09-13 RX ADMIN — ACETAMINOPHEN 975 MG: 325 TABLET ORAL at 21:09

## 2022-09-13 RX ADMIN — HYDROMORPHONE HYDROCHLORIDE 1 MG: 1 INJECTION, SOLUTION INTRAMUSCULAR; INTRAVENOUS; SUBCUTANEOUS at 15:34

## 2022-09-13 RX ADMIN — Medication 1 SPRAY: at 05:40

## 2022-09-13 RX ADMIN — GABAPENTIN 400 MG: 400 CAPSULE ORAL at 12:21

## 2022-09-13 RX ADMIN — LIDOCAINE 5% 2 PATCH: 700 PATCH TOPICAL at 18:00

## 2022-09-13 RX ADMIN — POTASSIUM CHLORIDE 40 MEQ: 1500 TABLET, EXTENDED RELEASE ORAL at 17:47

## 2022-09-13 RX ADMIN — GABAPENTIN 400 MG: 400 CAPSULE ORAL at 17:45

## 2022-09-13 RX ADMIN — METHOCARBAMOL TABLETS 1000 MG: 500 TABLET, COATED ORAL at 17:45

## 2022-09-13 RX ADMIN — HYDROMORPHONE HYDROCHLORIDE 1 MG: 1 INJECTION, SOLUTION INTRAMUSCULAR; INTRAVENOUS; SUBCUTANEOUS at 21:09
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
## 2022-09-14 LAB
AMYLASE FLD QL: 5693 U/L
AMYLASE SERPL-CCNC: 35 IU/L (ref 25–115)
ANION GAP SERPL CALCULATED.3IONS-SCNC: 0 MMOL/L (ref 4–13)
BASOPHILS # BLD AUTO: 0.02 THOUSANDS/ÂΜL (ref 0–0.1)
BASOPHILS NFR BLD AUTO: 0 % (ref 0–1)
BUN SERPL-MCNC: 4 MG/DL (ref 5–25)
CALCIUM SERPL-MCNC: 8 MG/DL (ref 8.3–10.1)
CHLORIDE SERPL-SCNC: 106 MMOL/L (ref 96–108)
CO2 SERPL-SCNC: 31 MMOL/L (ref 21–32)
CREAT SERPL-MCNC: 0.58 MG/DL (ref 0.6–1.3)
EOSINOPHIL # BLD AUTO: 0.05 THOUSAND/ÂΜL (ref 0–0.61)
EOSINOPHIL NFR BLD AUTO: 0 % (ref 0–6)
ERYTHROCYTE [DISTWIDTH] IN BLOOD BY AUTOMATED COUNT: 13.5 % (ref 11.6–15.1)
GFR SERPL CREATININE-BSD FRML MDRD: 113 ML/MIN/1.73SQ M
GLUCOSE SERPL-MCNC: 106 MG/DL (ref 65–140)
GLUCOSE SERPL-MCNC: 116 MG/DL (ref 65–140)
GLUCOSE SERPL-MCNC: 123 MG/DL (ref 65–140)
GLUCOSE SERPL-MCNC: 127 MG/DL (ref 65–140)
GLUCOSE SERPL-MCNC: 151 MG/DL (ref 65–140)
HCT VFR BLD AUTO: 33.2 % (ref 34.8–46.1)
HGB BLD-MCNC: 10.6 G/DL (ref 11.5–15.4)
IMM GRANULOCYTES # BLD AUTO: 0.08 THOUSAND/UL (ref 0–0.2)
IMM GRANULOCYTES NFR BLD AUTO: 1 % (ref 0–2)
LYMPHOCYTES # BLD AUTO: 1.52 THOUSANDS/ÂΜL (ref 0.6–4.47)
LYMPHOCYTES NFR BLD AUTO: 10 % (ref 14–44)
MCH RBC QN AUTO: 31 PG (ref 26.8–34.3)
MCHC RBC AUTO-ENTMCNC: 31.9 G/DL (ref 31.4–37.4)
MCV RBC AUTO: 97 FL (ref 82–98)
MONOCYTES # BLD AUTO: 1.59 THOUSAND/ÂΜL (ref 0.17–1.22)
MONOCYTES NFR BLD AUTO: 10 % (ref 4–12)
NEUTROPHILS # BLD AUTO: 12.26 THOUSANDS/ÂΜL (ref 1.85–7.62)
NEUTS SEG NFR BLD AUTO: 79 % (ref 43–75)
NRBC BLD AUTO-RTO: 0 /100 WBCS
PLATELET # BLD AUTO: 274 THOUSANDS/UL (ref 149–390)
PMV BLD AUTO: 10.5 FL (ref 8.9–12.7)
POTASSIUM SERPL-SCNC: 3.8 MMOL/L (ref 3.5–5.3)
RBC # BLD AUTO: 3.42 MILLION/UL (ref 3.81–5.12)
SODIUM SERPL-SCNC: 137 MMOL/L (ref 135–147)
WBC # BLD AUTO: 15.52 THOUSAND/UL (ref 4.31–10.16)

## 2022-09-14 PROCEDURE — 80048 BASIC METABOLIC PNL TOTAL CA: CPT | Performed by: SURGERY

## 2022-09-14 PROCEDURE — 85025 COMPLETE CBC W/AUTO DIFF WBC: CPT | Performed by: SURGERY

## 2022-09-14 PROCEDURE — 90670 PCV13 VACCINE IM: CPT | Performed by: SURGERY

## 2022-09-14 PROCEDURE — 99024 POSTOP FOLLOW-UP VISIT: CPT | Performed by: EMERGENCY MEDICINE

## 2022-09-14 PROCEDURE — 82150 ASSAY OF AMYLASE: CPT | Performed by: SURGERY

## 2022-09-14 PROCEDURE — 90648 HIB PRP-T VACCINE 4 DOSE IM: CPT | Performed by: SURGERY

## 2022-09-14 PROCEDURE — 90734 MENACWYD/MENACWYCRM VACC IM: CPT | Performed by: SURGERY

## 2022-09-14 PROCEDURE — 90471 IMMUNIZATION ADMIN: CPT | Performed by: SURGERY

## 2022-09-14 PROCEDURE — 82948 REAGENT STRIP/BLOOD GLUCOSE: CPT

## 2022-09-14 RX ORDER — POTASSIUM CHLORIDE 20 MEQ/1
20 TABLET, EXTENDED RELEASE ORAL ONCE
Status: COMPLETED | OUTPATIENT
Start: 2022-09-14 | End: 2022-09-14

## 2022-09-14 RX ADMIN — ENOXAPARIN SODIUM 40 MG: 40 INJECTION SUBCUTANEOUS at 10:51

## 2022-09-14 RX ADMIN — DOCUSATE SODIUM 100 MG: 100 CAPSULE, LIQUID FILLED ORAL at 18:51

## 2022-09-14 RX ADMIN — METHOCARBAMOL TABLETS 1000 MG: 500 TABLET, COATED ORAL at 00:18

## 2022-09-14 RX ADMIN — DEXTROSE, SODIUM CHLORIDE, AND POTASSIUM CHLORIDE 75 ML/HR: 5; .45; .15 INJECTION INTRAVENOUS at 04:30

## 2022-09-14 RX ADMIN — ACETAMINOPHEN 975 MG: 325 TABLET ORAL at 21:29

## 2022-09-14 RX ADMIN — LIDOCAINE 5% 2 PATCH: 700 PATCH TOPICAL at 10:48

## 2022-09-14 RX ADMIN — NEISSERIA MENINGITIDIS GROUP A CAPSULAR POLYSACCHARIDE DIPHTHERIA TOXOID CONJUGATE ANTIGEN, NEISSERIA MENINGITIDIS GROUP C CAPSULAR POLYSACCHARIDE DIPHTHERIA TOXOID CONJUGATE ANTIGEN, NEISSERIA MENINGITIDIS GROUP Y CAPSULAR POLYSACCHARIDE DIPHTHERIA TOXOID CONJUGATE ANTIGEN, AND NEISSERIA MENINGITIDIS GROUP W-135 CAPSULAR POLYSACCHARIDE DIPHTHERIA TOXOID CONJUGATE ANTIGEN 0.5 ML: 4; 4; 4; 4 INJECTION, SOLUTION INTRAMUSCULAR at 16:07

## 2022-09-14 RX ADMIN — GABAPENTIN 400 MG: 400 CAPSULE ORAL at 10:48

## 2022-09-14 RX ADMIN — METHOCARBAMOL TABLETS 1000 MG: 500 TABLET, COATED ORAL at 05:00

## 2022-09-14 RX ADMIN — PNEUMOCOCCAL 13-VALENT CONJUGATE VACCINE 0.5 ML: 2.2; 2.2; 2.2; 2.2; 2.2; 4.4; 2.2; 2.2; 2.2; 2.2; 2.2; 2.2; 2.2 INJECTION, SUSPENSION INTRAMUSCULAR at 16:15

## 2022-09-14 RX ADMIN — POLYETHYLENE GLYCOL 3350 17 G: 17 POWDER, FOR SOLUTION ORAL at 10:48

## 2022-09-14 RX ADMIN — OXYCODONE HYDROCHLORIDE 5 MG: 5 TABLET ORAL at 21:29

## 2022-09-14 RX ADMIN — GABAPENTIN 400 MG: 400 CAPSULE ORAL at 18:51

## 2022-09-14 RX ADMIN — INSULIN GLARGINE 5 UNITS: 100 INJECTION, SOLUTION SUBCUTANEOUS at 21:30

## 2022-09-14 RX ADMIN — METHOCARBAMOL TABLETS 1000 MG: 500 TABLET, COATED ORAL at 18:51

## 2022-09-14 RX ADMIN — ACETAMINOPHEN 975 MG: 325 TABLET ORAL at 13:54

## 2022-09-14 RX ADMIN — METHOCARBAMOL TABLETS 1000 MG: 500 TABLET, COATED ORAL at 11:01

## 2022-09-14 RX ADMIN — OXYCODONE HYDROCHLORIDE 5 MG: 5 TABLET ORAL at 00:19

## 2022-09-14 RX ADMIN — HYDROMORPHONE HYDROCHLORIDE 1 MG: 1 INJECTION, SOLUTION INTRAMUSCULAR; INTRAVENOUS; SUBCUTANEOUS at 04:00

## 2022-09-14 RX ADMIN — HAEMOPHILUS B POLYSACCHARIDE CONJUGATE VACCINE FOR INJ 0.5 ML: RECON SOLN at 16:02

## 2022-09-14 RX ADMIN — GABAPENTIN 400 MG: 400 CAPSULE ORAL at 13:54

## 2022-09-14 RX ADMIN — ACETAMINOPHEN 975 MG: 325 TABLET ORAL at 05:00

## 2022-09-14 RX ADMIN — POTASSIUM CHLORIDE 20 MEQ: 1500 TABLET, EXTENDED RELEASE ORAL at 10:48

## 2022-09-14 RX ADMIN — GABAPENTIN 400 MG: 400 CAPSULE ORAL at 21:29

## 2022-09-14 RX ADMIN — DOCUSATE SODIUM 100 MG: 100 CAPSULE, LIQUID FILLED ORAL at 10:48
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
## 2022-09-14 NOTE — PLAN OF CARE
Problem: MOBILITY - ADULT  Goal: Maintain or return to baseline ADL function  Description: INTERVENTIONS:  -  Assess patient's ability to carry out ADLs; assess patient's baseline for ADL function and identify physical deficits which impact ability to perform ADLs (bathing, care of mouth/teeth, toileting, grooming, dressing, etc )  - Assess/evaluate cause of self-care deficits   - Assess range of motion  - Assess patient's mobility; develop plan if impaired  - Assess patient's need for assistive devices and provide as appropriate  - Encourage maximum independence but intervene and supervise when necessary  - Involve family in performance of ADLs  - Assess for home care needs following discharge   - Consider OT consult to assist with ADL evaluation and planning for discharge  - Provide patient education as appropriate  Outcome: Progressing  Goal: Maintains/Returns to pre admission functional level  Description: INTERVENTIONS:  - Perform BMAT or MOVE assessment daily    - Set and communicate daily mobility goal to care team and patient/family/caregiver  - Collaborate with rehabilitation services on mobility goals if consulted  - Out of bed for toileting  - Record patient progress and toleration of activity level   Outcome: Progressing     Problem: Nutrition/Hydration-ADULT  Goal: Nutrient/Hydration intake appropriate for improving, restoring or maintaining nutritional needs  Description: Monitor and assess patient's nutrition/hydration status for malnutrition  Collaborate with interdisciplinary team and initiate plan and interventions as ordered  Monitor patient's weight and dietary intake as ordered or per policy  Utilize nutrition screening tool and intervene as necessary  Determine patient's food preferences and provide high-protein, high-caloric foods as appropriate       INTERVENTIONS:  - Monitor oral intake, urinary output, labs, and treatment plans  - Assess nutrition and hydration status and recommend course of action  - Evaluate amount of meals eaten  - Assist patient with eating if necessary   - Allow adequate time for meals  - Recommend/ encourage appropriate diets, oral nutritional supplements, and vitamin/mineral supplements  - Order, calculate, and assess calorie counts as needed  - Recommend, monitor, and adjust tube feedings and TPN/PPN based on assessed needs  - Assess need for intravenous fluids  - Provide specific nutrition/hydration education as appropriate  - Include patient/family/caregiver in decisions related to nutrition  Outcome: Progressing

## 2022-09-14 NOTE — RESTORATIVE TECHNICIAN NOTE
Restorative Technician Note      Patient Name: Jewel Sultana     Restorative Tech Visit Date: 9/14/2022  Note Type: Mobility  Patient Position Upon Consult: Standing  Activity Performed: Ambulated  Assistive Device: Other (Comment) (no AD)  Patient Position at End of Consult: Standing; All needs within reach    Pt states that she has been ambulating with family throughout the day  Has no concerns with mobility at this time  Educated on importance and benefits of mobility      Betzaida Lema  DPT, Restorative Technician
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
## 2022-09-15 LAB
ANION GAP SERPL CALCULATED.3IONS-SCNC: 4 MMOL/L (ref 4–13)
BASOPHILS # BLD AUTO: 0.03 THOUSANDS/ÂΜL (ref 0–0.1)
BASOPHILS NFR BLD AUTO: 0 % (ref 0–1)
BUN SERPL-MCNC: 7 MG/DL (ref 5–25)
CALCIUM SERPL-MCNC: 8.8 MG/DL (ref 8.3–10.1)
CHLORIDE SERPL-SCNC: 105 MMOL/L (ref 96–108)
CO2 SERPL-SCNC: 28 MMOL/L (ref 21–32)
CREAT SERPL-MCNC: 0.48 MG/DL (ref 0.6–1.3)
EOSINOPHIL # BLD AUTO: 0.39 THOUSAND/ÂΜL (ref 0–0.61)
EOSINOPHIL NFR BLD AUTO: 4 % (ref 0–6)
ERYTHROCYTE [DISTWIDTH] IN BLOOD BY AUTOMATED COUNT: 13.2 % (ref 11.6–15.1)
GFR SERPL CREATININE-BSD FRML MDRD: 120 ML/MIN/1.73SQ M
GLUCOSE SERPL-MCNC: 101 MG/DL (ref 65–140)
GLUCOSE SERPL-MCNC: 103 MG/DL (ref 65–140)
GLUCOSE SERPL-MCNC: 110 MG/DL (ref 65–140)
GLUCOSE SERPL-MCNC: 151 MG/DL (ref 65–140)
HCT VFR BLD AUTO: 32.6 % (ref 34.8–46.1)
HGB BLD-MCNC: 10.2 G/DL (ref 11.5–15.4)
IMM GRANULOCYTES # BLD AUTO: 0.04 THOUSAND/UL (ref 0–0.2)
IMM GRANULOCYTES NFR BLD AUTO: 0 % (ref 0–2)
LYMPHOCYTES # BLD AUTO: 2.12 THOUSANDS/ÂΜL (ref 0.6–4.47)
LYMPHOCYTES NFR BLD AUTO: 19 % (ref 14–44)
MCH RBC QN AUTO: 30.7 PG (ref 26.8–34.3)
MCHC RBC AUTO-ENTMCNC: 31.3 G/DL (ref 31.4–37.4)
MCV RBC AUTO: 98 FL (ref 82–98)
MONOCYTES # BLD AUTO: 1.24 THOUSAND/ÂΜL (ref 0.17–1.22)
MONOCYTES NFR BLD AUTO: 11 % (ref 4–12)
NEUTROPHILS # BLD AUTO: 7.34 THOUSANDS/ÂΜL (ref 1.85–7.62)
NEUTS SEG NFR BLD AUTO: 66 % (ref 43–75)
NRBC BLD AUTO-RTO: 0 /100 WBCS
PLATELET # BLD AUTO: 299 THOUSANDS/UL (ref 149–390)
PMV BLD AUTO: 10.6 FL (ref 8.9–12.7)
POTASSIUM SERPL-SCNC: 4 MMOL/L (ref 3.5–5.3)
RBC # BLD AUTO: 3.32 MILLION/UL (ref 3.81–5.12)
SODIUM SERPL-SCNC: 137 MMOL/L (ref 135–147)
WBC # BLD AUTO: 11.16 THOUSAND/UL (ref 4.31–10.16)

## 2022-09-15 PROCEDURE — 0HC7XZZ EXTIRPATION OF MATTER FROM ABDOMEN SKIN, EXTERNAL APPROACH: ICD-10-PCS | Performed by: SURGERY

## 2022-09-15 PROCEDURE — 85025 COMPLETE CBC W/AUTO DIFF WBC: CPT | Performed by: SURGERY

## 2022-09-15 PROCEDURE — 99024 POSTOP FOLLOW-UP VISIT: CPT | Performed by: EMERGENCY MEDICINE

## 2022-09-15 PROCEDURE — 82948 REAGENT STRIP/BLOOD GLUCOSE: CPT

## 2022-09-15 PROCEDURE — 80048 BASIC METABOLIC PNL TOTAL CA: CPT | Performed by: SURGERY

## 2022-09-15 RX ORDER — HYDROMORPHONE HCL IN WATER/PF 6 MG/30 ML
0.2 PATIENT CONTROLLED ANALGESIA SYRINGE INTRAVENOUS
Status: DISCONTINUED | OUTPATIENT
Start: 2022-09-15 | End: 2022-09-15

## 2022-09-15 RX ORDER — ACETAMINOPHEN 325 MG/1
650 TABLET ORAL EVERY 6 HOURS PRN
Status: DISCONTINUED | OUTPATIENT
Start: 2022-09-15 | End: 2022-09-16 | Stop reason: HOSPADM

## 2022-09-15 RX ORDER — OXYCODONE HYDROCHLORIDE 5 MG/1
2.5 TABLET ORAL EVERY 4 HOURS PRN
Status: DISCONTINUED | OUTPATIENT
Start: 2022-09-15 | End: 2022-09-16 | Stop reason: HOSPADM

## 2022-09-15 RX ORDER — SENNOSIDES 8.6 MG
2 TABLET ORAL 2 TIMES DAILY
Status: DISCONTINUED | OUTPATIENT
Start: 2022-09-15 | End: 2022-09-16 | Stop reason: HOSPADM

## 2022-09-15 RX ORDER — OXYCODONE HYDROCHLORIDE 5 MG/1
5 TABLET ORAL EVERY 4 HOURS PRN
Status: DISCONTINUED | OUTPATIENT
Start: 2022-09-15 | End: 2022-09-16 | Stop reason: HOSPADM

## 2022-09-15 RX ORDER — METOCLOPRAMIDE HYDROCHLORIDE 5 MG/ML
10 INJECTION INTRAMUSCULAR; INTRAVENOUS EVERY 6 HOURS SCHEDULED
Status: DISCONTINUED | OUTPATIENT
Start: 2022-09-15 | End: 2022-09-16 | Stop reason: HOSPADM

## 2022-09-15 RX ORDER — BISACODYL 10 MG
10 SUPPOSITORY, RECTAL RECTAL DAILY
Status: DISCONTINUED | OUTPATIENT
Start: 2022-09-15 | End: 2022-09-16 | Stop reason: HOSPADM

## 2022-09-15 RX ADMIN — GABAPENTIN 400 MG: 400 CAPSULE ORAL at 12:45

## 2022-09-15 RX ADMIN — MAGNESIUM HYDROXIDE 30 ML: 1200 LIQUID ORAL at 10:22

## 2022-09-15 RX ADMIN — ENOXAPARIN SODIUM 40 MG: 40 INJECTION SUBCUTANEOUS at 10:31

## 2022-09-15 RX ADMIN — SENNOSIDES 17.2 MG: 8.6 TABLET, FILM COATED ORAL at 10:23

## 2022-09-15 RX ADMIN — BISACODYL 10 MG: 10 SUPPOSITORY RECTAL at 15:55

## 2022-09-15 RX ADMIN — GABAPENTIN 400 MG: 400 CAPSULE ORAL at 10:23

## 2022-09-15 RX ADMIN — DOCUSATE SODIUM 100 MG: 100 CAPSULE, LIQUID FILLED ORAL at 17:31

## 2022-09-15 RX ADMIN — ACETAMINOPHEN 650 MG: 325 TABLET ORAL at 15:56

## 2022-09-15 RX ADMIN — POLYETHYLENE GLYCOL 3350 17 G: 17 POWDER, FOR SOLUTION ORAL at 10:22

## 2022-09-15 RX ADMIN — ACETAMINOPHEN 975 MG: 325 TABLET ORAL at 06:12

## 2022-09-15 RX ADMIN — METHOCARBAMOL TABLETS 1000 MG: 500 TABLET, COATED ORAL at 00:34

## 2022-09-15 RX ADMIN — MAGNESIUM HYDROXIDE 30 ML: 1200 LIQUID ORAL at 21:13

## 2022-09-15 RX ADMIN — INSULIN LISPRO 1 UNITS: 100 INJECTION, SOLUTION INTRAVENOUS; SUBCUTANEOUS at 12:45

## 2022-09-15 RX ADMIN — GABAPENTIN 400 MG: 400 CAPSULE ORAL at 21:13

## 2022-09-15 RX ADMIN — METHOCARBAMOL TABLETS 1000 MG: 500 TABLET, COATED ORAL at 17:31

## 2022-09-15 RX ADMIN — METOCLOPRAMIDE HYDROCHLORIDE 10 MG: 5 INJECTION INTRAMUSCULAR; INTRAVENOUS at 15:56

## 2022-09-15 RX ADMIN — METHOCARBAMOL TABLETS 1000 MG: 500 TABLET, COATED ORAL at 06:12

## 2022-09-15 RX ADMIN — LIDOCAINE 5% 2 PATCH: 700 PATCH TOPICAL at 10:28

## 2022-09-15 RX ADMIN — DOCUSATE SODIUM 100 MG: 100 CAPSULE, LIQUID FILLED ORAL at 10:23

## 2022-09-15 RX ADMIN — SENNOSIDES 17.2 MG: 8.6 TABLET, FILM COATED ORAL at 17:31

## 2022-09-15 RX ADMIN — GABAPENTIN 400 MG: 400 CAPSULE ORAL at 17:31

## 2022-09-15 RX ADMIN — METHOCARBAMOL TABLETS 1000 MG: 500 TABLET, COATED ORAL at 12:45

## 2022-09-15 NOTE — QUICK NOTE
Surgery  Quick note    Small amount of brown, non purulent drainage from superior aspect of incision  One staple removed  Fascia probed with cotton tip applicator and found to be intact  Flushed with 10 cc saline  Small amount of fat necrosis evacuated  Flushed again with 10 cc saline  Packed with one piece of plain packing  Pt tolerated without complications       Miriam Roger MD  9/15/22  2:58 PM

## 2022-09-15 NOTE — PHYSICAL THERAPY NOTE
Physical Therapy Screen    Patient's Name: Sahara Roe       09/15/22 7411   PT Last Visit   PT Visit Date 09/15/22   Note Type   Note type Screen     Orders received, chart reviewed  Noted that pt has been performing transfers and ambulating the halls independently multiple times/day  Confirmed with restorative  No further acute PT needs, PT signing off  Please re-consult if any changes in functional status  Thank you       Jo Hudson, PT, DPT
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
## 2022-09-15 NOTE — PLAN OF CARE
Problem: Nutrition/Hydration-ADULT  Goal: Nutrient/Hydration intake appropriate for improving, restoring or maintaining nutritional needs  Description: Monitor and assess patient's nutrition/hydration status for malnutrition  Collaborate with interdisciplinary team and initiate plan and interventions as ordered  Monitor patient's weight and dietary intake as ordered or per policy  Utilize nutrition screening tool and intervene as necessary  Determine patient's food preferences and provide high-protein, high-caloric foods as appropriate       INTERVENTIONS:  - Monitor oral intake, urinary output, labs, and treatment plans  - Assess nutrition and hydration status and recommend course of action  - Evaluate amount of meals eaten  - Assist patient with eating if necessary   - Allow adequate time for meals  - Recommend/ encourage appropriate diets, oral nutritional supplements, and vitamin/mineral supplements  - Order, calculate, and assess calorie counts as needed  - Recommend, monitor, and adjust tube feedings and TPN/PPN based on assessed needs  - Assess need for intravenous fluids  - Provide specific nutrition/hydration education as appropriate  - Include patient/family/caregiver in decisions related to nutrition  Outcome: Progressing     Problem: PAIN - ADULT  Goal: Verbalizes/displays adequate comfort level or baseline comfort level  Description: Interventions:  - Encourage patient to monitor pain and request assistance  - Assess pain using appropriate pain scale  - Administer analgesics based on type and severity of pain and evaluate response  - Implement non-pharmacological measures as appropriate and evaluate response  - Consider cultural and social influences on pain and pain management  - Notify physician/advanced practitioner if interventions unsuccessful or patient reports new pain  9/15/2022 0222 by Diane Valle RN  Outcome: Progressing  9/15/2022 0222 by Diane Valle RN  Outcome: Progressing     Problem: INFECTION - ADULT  Goal: Absence or prevention of progression during hospitalization  Description: INTERVENTIONS:  - Assess and monitor for signs and symptoms of infection  - Monitor lab/diagnostic results  - Monitor all insertion sites, i e  indwelling lines, tubes, and drains  - Monitor endotracheal if appropriate and nasal secretions for changes in amount and color  - [place] appropriate cooling/warming therapies per order  - Administer medications as ordered  - Instruct and encourage patient and family to use good hand hygiene technique  - Identify and instruct in appropriate isolation precautions for identified infection/condition  9/15/2022 0222 by Sara Marcelo RN  Outcome: Progressing  9/15/2022 0222 by Sara Marcelo RN  Outcome: Progressing  Goal: Absence of fever/infection during neutropenic period  Description: INTERVENTIONS:  - Monitor WBC    9/15/2022 0222 by Sara Marcelo RN  Outcome: Progressing  9/15/2022 0222 by Sara Marcelo RN  Outcome: Progressing     Problem: DISCHARGE PLANNING  Goal: Discharge to home or other facility with appropriate resources  Description: INTERVENTIONS:  - Identify barriers to discharge w/patient and caregiver  - Arrange for needed discharge resources and transportation as appropriate  - Identify discharge learning needs (meds, wound care, etc )  - Arrange for interpretive services to assist at discharge as needed  - Refer to Case Management Department for coordinating discharge planning if the patient needs post-hospital services based on physician/advanced practitioner order or complex needs related to functional status, cognitive ability, or social support system  9/15/2022 0222 by Sara Marcelo RN  Outcome: Progressing  9/15/2022 0222 by Sara Marcelo RN  Outcome: Progressing     Problem: Knowledge Deficit  Goal: Patient/family/caregiver demonstrates understanding of disease process, treatment plan, medications, and discharge instructions  Description: Complete learning assessment and assess knowledge base    Interventions:  - Provide teaching at level of understanding  - Provide teaching via preferred learning methods  Outcome: Progressing

## 2022-09-15 NOTE — PLAN OF CARE
Problem: MOBILITY - ADULT  Goal: Maintain or return to baseline ADL function  Description: INTERVENTIONS:  -  Assess patient's ability to carry out ADLs; assess patient's baseline for ADL function and identify physical deficits which impact ability to perform ADLs (bathing, care of mouth/teeth, toileting, grooming, dressing, etc )  - Assess/evaluate cause of self-care deficits   - Assess range of motion  - Assess patient's mobility; develop plan if impaired  - Assess patient's need for assistive devices and provide as appropriate  - Encourage maximum independence but intervene and supervise when necessary  - Involve family in performance of ADLs  - Assess for home care needs following discharge   - Consider OT consult to assist with ADL evaluation and planning for discharge  - Provide patient education as appropriate  Outcome: Progressing  Goal: Maintains/Returns to pre admission functional level  Description: INTERVENTIONS:  - Perform BMAT or MOVE assessment daily    - Set and communicate daily mobility goal to care team and patient/family/caregiver  - Collaborate with rehabilitation services on mobility goals if consulted  - Out of bed for toileting  - Record patient progress and toleration of activity level   Outcome: Progressing     Problem: Nutrition/Hydration-ADULT  Goal: Nutrient/Hydration intake appropriate for improving, restoring or maintaining nutritional needs  Description: Monitor and assess patient's nutrition/hydration status for malnutrition  Collaborate with interdisciplinary team and initiate plan and interventions as ordered  Monitor patient's weight and dietary intake as ordered or per policy  Utilize nutrition screening tool and intervene as necessary  Determine patient's food preferences and provide high-protein, high-caloric foods as appropriate       INTERVENTIONS:  - Monitor oral intake, urinary output, labs, and treatment plans  - Assess nutrition and hydration status and recommend course of action  - Evaluate amount of meals eaten  - Assist patient with eating if necessary   - Allow adequate time for meals  - Recommend/ encourage appropriate diets, oral nutritional supplements, and vitamin/mineral supplements  - Order, calculate, and assess calorie counts as needed  - Recommend, monitor, and adjust tube feedings and TPN/PPN based on assessed needs  - Assess need for intravenous fluids  - Provide specific nutrition/hydration education as appropriate  - Include patient/family/caregiver in decisions related to nutrition  Outcome: Progressing     Problem: PAIN - ADULT  Goal: Verbalizes/displays adequate comfort level or baseline comfort level  Description: Interventions:  - Encourage patient to monitor pain and request assistance  - Assess pain using appropriate pain scale  - Administer analgesics based on type and severity of pain and evaluate response  - Implement non-pharmacological measures as appropriate and evaluate response  - Consider cultural and social influences on pain and pain management  - Notify physician/advanced practitioner if interventions unsuccessful or patient reports new pain  Outcome: Progressing     Problem: INFECTION - ADULT  Goal: Absence or prevention of progression during hospitalization  Description: INTERVENTIONS:  - Assess and monitor for signs and symptoms of infection  - Monitor lab/diagnostic results  - Monitor all insertion sites, i e  indwelling lines, tubes, and drains  - Monitor endotracheal if appropriate and nasal secretions for changes in amount and color  - [place] appropriate cooling/warming therapies per order  - Administer medications as ordered  - Instruct and encourage patient and family to use good hand hygiene technique  - Identify and instruct in appropriate isolation precautions for identified infection/condition  Outcome: Progressing  Goal: Absence of fever/infection during neutropenic period  Description: INTERVENTIONS:  - Monitor WBC    Outcome: Progressing

## 2022-09-16 VITALS
RESPIRATION RATE: 16 BRPM | HEART RATE: 82 BPM | OXYGEN SATURATION: 96 % | DIASTOLIC BLOOD PRESSURE: 62 MMHG | SYSTOLIC BLOOD PRESSURE: 99 MMHG | TEMPERATURE: 98.6 F

## 2022-09-16 LAB
AMYLASE FLD QL: NORMAL U/L
AMYLASE SERPL-CCNC: 20 IU/L (ref 25–115)
GLUCOSE SERPL-MCNC: 112 MG/DL (ref 65–140)
PLATELET # BLD AUTO: 379 THOUSANDS/UL (ref 149–390)
PMV BLD AUTO: 9.8 FL (ref 8.9–12.7)

## 2022-09-16 PROCEDURE — 99024 POSTOP FOLLOW-UP VISIT: CPT | Performed by: EMERGENCY MEDICINE

## 2022-09-16 PROCEDURE — 82150 ASSAY OF AMYLASE: CPT | Performed by: SURGERY

## 2022-09-16 PROCEDURE — 85049 AUTOMATED PLATELET COUNT: CPT | Performed by: SURGERY

## 2022-09-16 PROCEDURE — 82948 REAGENT STRIP/BLOOD GLUCOSE: CPT

## 2022-09-16 RX ORDER — OXYCODONE HYDROCHLORIDE 5 MG/1
5 TABLET ORAL EVERY 4 HOURS PRN
Qty: 15 TABLET | Refills: 0 | Status: ON HOLD | OUTPATIENT
Start: 2022-09-16 | End: 2022-09-21 | Stop reason: CLARIF

## 2022-09-16 RX ORDER — DOCUSATE SODIUM 100 MG/1
100 CAPSULE, LIQUID FILLED ORAL 2 TIMES DAILY
Qty: 28 CAPSULE | Refills: 0 | Status: SHIPPED | OUTPATIENT
Start: 2022-09-16 | End: 2022-10-04

## 2022-09-16 RX ORDER — BLOOD-GLUCOSE METER
KIT MISCELLANEOUS
Qty: 1 KIT | Refills: 0 | Status: SHIPPED | OUTPATIENT
Start: 2022-09-16

## 2022-09-16 RX ORDER — METHOCARBAMOL 500 MG/1
1000 TABLET, FILM COATED ORAL EVERY 6 HOURS SCHEDULED
Qty: 112 TABLET | Refills: 0 | Status: SHIPPED | OUTPATIENT
Start: 2022-09-16 | End: 2022-10-27

## 2022-09-16 RX ORDER — GABAPENTIN 400 MG/1
400 CAPSULE ORAL 4 TIMES DAILY
Qty: 56 CAPSULE | Refills: 0 | Status: SHIPPED | OUTPATIENT
Start: 2022-09-16 | End: 2022-10-27

## 2022-09-16 RX ORDER — LANCETS 33 GAUGE
EACH MISCELLANEOUS
Qty: 100 EACH | Refills: 0 | Status: SHIPPED | OUTPATIENT
Start: 2022-09-16

## 2022-09-16 RX ORDER — GLUCOSAMINE HCL/CHONDROITIN SU 500-400 MG
CAPSULE ORAL
Qty: 100 EACH | Refills: 0 | Status: SHIPPED | OUTPATIENT
Start: 2022-09-16

## 2022-09-16 RX ORDER — ACETAMINOPHEN 325 MG/1
650 TABLET ORAL EVERY 6 HOURS
Qty: 112 TABLET | Refills: 0 | Status: SHIPPED | OUTPATIENT
Start: 2022-09-16 | End: 2022-10-04

## 2022-09-16 RX ORDER — BLOOD SUGAR DIAGNOSTIC
STRIP MISCELLANEOUS
Qty: 100 EACH | Refills: 0 | Status: SHIPPED | OUTPATIENT
Start: 2022-09-16

## 2022-09-16 RX ADMIN — GABAPENTIN 400 MG: 400 CAPSULE ORAL at 09:15

## 2022-09-16 RX ADMIN — POLYETHYLENE GLYCOL 3350 17 G: 17 POWDER, FOR SOLUTION ORAL at 09:15

## 2022-09-16 RX ADMIN — BISACODYL 10 MG: 10 SUPPOSITORY RECTAL at 09:15

## 2022-09-16 RX ADMIN — METHOCARBAMOL TABLETS 1000 MG: 500 TABLET, COATED ORAL at 06:05

## 2022-09-16 RX ADMIN — DOCUSATE SODIUM 100 MG: 100 CAPSULE, LIQUID FILLED ORAL at 09:15

## 2022-09-16 RX ADMIN — SENNOSIDES 17.2 MG: 8.6 TABLET, FILM COATED ORAL at 09:15

## 2022-09-16 RX ADMIN — LIDOCAINE 5% 2 PATCH: 700 PATCH TOPICAL at 09:15

## 2022-09-16 RX ADMIN — ENOXAPARIN SODIUM 40 MG: 40 INJECTION SUBCUTANEOUS at 09:15

## 2022-09-16 RX ADMIN — METOCLOPRAMIDE HYDROCHLORIDE 10 MG: 5 INJECTION INTRAMUSCULAR; INTRAVENOUS at 06:05

## 2022-09-16 RX ADMIN — METHOCARBAMOL TABLETS 1000 MG: 500 TABLET, COATED ORAL at 00:35

## 2022-09-16 RX ADMIN — MAGNESIUM HYDROXIDE 30 ML: 1200 LIQUID ORAL at 09:15

## 2022-09-16 RX ADMIN — METOCLOPRAMIDE HYDROCHLORIDE 10 MG: 5 INJECTION INTRAMUSCULAR; INTRAVENOUS at 00:34

## 2022-09-16 NOTE — DISCHARGE INSTRUCTIONS
No heavy lifting  Limit 10 lb for the next 4-6 weeks  Change your dressing daily  Okay to shower  Remove packing from superior and inferior aspect of your incision while in the shower  Let soap and water run over the incision  Pat dry  Place a small piece of packing in the superior and inferior wound  Cover with gauze  No swimming until wound is healed  No submersion bathing  No driving on narcotic pain medication  Call the office with any concerns  Follow-up in the office on 09/19 and 9/26  Measure drain output and color daily

## 2022-09-16 NOTE — PLAN OF CARE
Problem: MOBILITY - ADULT  Goal: Maintain or return to baseline ADL function  Description: INTERVENTIONS:  -  Assess patient's ability to carry out ADLs; assess patient's baseline for ADL function and identify physical deficits which impact ability to perform ADLs (bathing, care of mouth/teeth, toileting, grooming, dressing, etc )  - Assess/evaluate cause of self-care deficits   - Assess range of motion  - Assess patient's mobility; develop plan if impaired  - Assess patient's need for assistive devices and provide as appropriate  - Encourage maximum independence but intervene and supervise when necessary  - Involve family in performance of ADLs  - Assess for home care needs following discharge   - Consider OT consult to assist with ADL evaluation and planning for discharge  - Provide patient education as appropriate  Outcome: Progressing     Problem: Nutrition/Hydration-ADULT  Goal: Nutrient/Hydration intake appropriate for improving, restoring or maintaining nutritional needs  Description: Monitor and assess patient's nutrition/hydration status for malnutrition  Collaborate with interdisciplinary team and initiate plan and interventions as ordered  Monitor patient's weight and dietary intake as ordered or per policy  Utilize nutrition screening tool and intervene as necessary  Determine patient's food preferences and provide high-protein, high-caloric foods as appropriate       INTERVENTIONS:  - Monitor oral intake, urinary output, labs, and treatment plans  - Assess nutrition and hydration status and recommend course of action  - Evaluate amount of meals eaten  - Assist patient with eating if necessary   - Allow adequate time for meals  - Recommend/ encourage appropriate diets, oral nutritional supplements, and vitamin/mineral supplements  - Order, calculate, and assess calorie counts as needed  - Recommend, monitor, and adjust tube feedings and TPN/PPN based on assessed needs  - Assess need for intravenous fluids  - Provide specific nutrition/hydration education as appropriate  - Include patient/family/caregiver in decisions related to nutrition  Outcome: Progressing     Problem: PAIN - ADULT  Goal: Verbalizes/displays adequate comfort level or baseline comfort level  Description: Interventions:  - Encourage patient to monitor pain and request assistance  - Assess pain using appropriate pain scale  - Administer analgesics based on type and severity of pain and evaluate response  - Implement non-pharmacological measures as appropriate and evaluate response  - Consider cultural and social influences on pain and pain management  - Notify physician/advanced practitioner if interventions unsuccessful or patient reports new pain  Outcome: Progressing     Problem: INFECTION - ADULT  Goal: Absence or prevention of progression during hospitalization  Description: INTERVENTIONS:  - Assess and monitor for signs and symptoms of infection  - Monitor lab/diagnostic results  - Monitor all insertion sites, i e  indwelling lines, tubes, and drains  - Monitor endotracheal if appropriate and nasal secretions for changes in amount and color  - [place] appropriate cooling/warming therapies per order  - Administer medications as ordered  - Instruct and encourage patient and family to use good hand hygiene technique  - Identify and instruct in appropriate isolation precautions for identified infection/condition  Outcome: Progressing     Problem: SAFETY ADULT  Goal: Maintain or return to baseline ADL function  Description: INTERVENTIONS:  -  Assess patient's ability to carry out ADLs; assess patient's baseline for ADL function and identify physical deficits which impact ability to perform ADLs (bathing, care of mouth/teeth, toileting, grooming, dressing, etc )  - Assess/evaluate cause of self-care deficits   - Assess range of motion  - Assess patient's mobility; develop plan if impaired  - Assess patient's need for assistive devices and provide as appropriate  - Encourage maximum independence but intervene and supervise when necessary  - Involve family in performance of ADLs  - Assess for home care needs following discharge   - Consider OT consult to assist with ADL evaluation and planning for discharge  - Provide patient education as appropriate  Outcome: Progressing  Goal: Patient will remain free of falls  Description: INTERVENTIONS:  - Educate patient/family on patient safety including physical limitations  - Instruct patient to call for assistance with activity   - Consult OT/PT to assist with strengthening/mobility   - Keep Call bell within reach  - Keep bed low and locked with side rails adjusted as appropriate  - Keep care items and personal belongings within reach  - Initiate and maintain comfort rounds  - Apply yellow socks and bracelet for high fall risk patients  - Consider moving patient to room near nurses station  Outcome: Progressing     Problem: DISCHARGE PLANNING  Goal: Discharge to home or other facility with appropriate resources  Description: INTERVENTIONS:  - Identify barriers to discharge w/patient and caregiver  - Arrange for needed discharge resources and transportation as appropriate  - Identify discharge learning needs (meds, wound care, etc )  - Arrange for interpretive services to assist at discharge as needed  - Refer to Case Management Department for coordinating discharge planning if the patient needs post-hospital services based on physician/advanced practitioner order or complex needs related to functional status, cognitive ability, or social support system  Outcome: Progressing     Problem: Knowledge Deficit  Goal: Patient/family/caregiver demonstrates understanding of disease process, treatment plan, medications, and discharge instructions  Description: Complete learning assessment and assess knowledge base    Interventions:  - Provide teaching at level of understanding  - Provide teaching via preferred learning methods  Outcome: Progressing     Problem: Potential for Falls  Goal: Patient will remain free of falls  Description: INTERVENTIONS:  - Educate patient/family on patient safety including physical limitations  - Instruct patient to call for assistance with activity   - Consult OT/PT to assist with strengthening/mobility   - Keep Call bell within reach  - Keep bed low and locked with side rails adjusted as appropriate  - Keep care items and personal belongings within reach  - Initiate and maintain comfort rounds  - Make Fall Risk Sign visible to staff  - Apply yellow socks and bracelet for high fall risk patients  - Consider moving patient to room near nurses station  Outcome: Progressing

## 2022-09-16 NOTE — UTILIZATION REVIEW
Notification of Discharge   This is a Notification of Discharge from our facility [address]  Please be advised that this patient has been discharge from our facility  Below you will find the admission and discharge date and time including the patients disposition  UTILIZATION REVIEW CONTACT:  Niru España  Utilization   Network Utilization Review Department  Phone: [phone] x carefully listen to the prompts  All voicemails are confidential   Email: [email or web address]     PHYSICIAN ADVISORY SERVICES:  FOR [id number] REVIEW - MEDICAL NECESSITY DENIAL  Phone: [phone]  Fax: [phone]  Email: [email or web address]     PRESENTATION DATE: 9/10/2022  9:02 PM  OBERVATION ADMISSION DATE:   INPATIENT ADMISSION DATE: 9/10/22 10:05 PM   DISCHARGE DATE: 9/16/2022 11:38 AM  DISPOSITION: Home/Self Care Home/Self Care      IMPORTANT INFORMATION:  Send all requests for admission clinical reviews, approved or denied determinations and any other requests to dedicated fax number below belonging to the campus where the patient is receiving treatment   List of dedicated fax numbers:  [address] DENIALS (Administrative/Medical Necessity) [phone]   1000  [facility] (Maternity/NICU/Pediatrics) [phone]   Mary Flatten [phone]   130 [facility] [phone]   [address] [place] [address] [phone]   2000 [facility] [address] [place] [address],[address] [phone]   [facility]  [phone]   2205 [facility], [facility]  2401 [facility] 1000 [facility] [phone]
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
## 2022-09-16 NOTE — PLAN OF CARE
Problem: MOBILITY - ADULT  Goal: Maintain or return to baseline ADL function  Description: INTERVENTIONS:  -  Assess patient's ability to carry out ADLs; assess patient's baseline for ADL function and identify physical deficits which impact ability to perform ADLs (bathing, care of mouth/teeth, toileting, grooming, dressing, etc )  - Assess/evaluate cause of self-care deficits   - Assess range of motion  - Assess patient's mobility; develop plan if impaired  - Assess patient's need for assistive devices and provide as appropriate  - Encourage maximum independence but intervene and supervise when necessary  - Involve family in performance of ADLs  - Assess for home care needs following discharge   - Consider OT consult to assist with ADL evaluation and planning for discharge  - Provide patient education as appropriate  9/16/2022 1102 by Rian Pat RN  Outcome: Completed  9/16/2022 0737 by Rian Pat RN  Outcome: Progressing  Goal: Maintains/Returns to pre admission functional level  Description: INTERVENTIONS:  - Perform BMAT or MOVE assessment daily    - Set and communicate daily mobility goal to care team and patient/family/caregiver  - Out of bed for toileting  - Record patient progress and toleration of activity level   Outcome: Completed     Problem: Nutrition/Hydration-ADULT  Goal: Nutrient/Hydration intake appropriate for improving, restoring or maintaining nutritional needs  Description: Monitor and assess patient's nutrition/hydration status for malnutrition  Collaborate with interdisciplinary team and initiate plan and interventions as ordered  Monitor patient's weight and dietary intake as ordered or per policy  Utilize nutrition screening tool and intervene as necessary  Determine patient's food preferences and provide high-protein, high-caloric foods as appropriate       INTERVENTIONS:  - Monitor oral intake, urinary output, labs, and treatment plans  - Assess nutrition and hydration status and recommend course of action  - Evaluate amount of meals eaten  - Assist patient with eating if necessary   - Allow adequate time for meals  - Recommend/ encourage appropriate diets, oral nutritional supplements, and vitamin/mineral supplements  - Order, calculate, and assess calorie counts as needed  - Recommend, monitor, and adjust tube feedings and TPN/PPN based on assessed needs  - Assess need for intravenous fluids  - Provide specific nutrition/hydration education as appropriate  - Include patient/family/caregiver in decisions related to nutrition  9/16/2022 1102 by Prasad Gambino RN  Outcome: Completed  9/16/2022 0737 by Prasad Gambino RN  Outcome: Progressing     Problem: PAIN - ADULT  Goal: Verbalizes/displays adequate comfort level or baseline comfort level  Description: Interventions:  - Encourage patient to monitor pain and request assistance  - Assess pain using appropriate pain scale  - Administer analgesics based on type and severity of pain and evaluate response  - Implement non-pharmacological measures as appropriate and evaluate response  - Consider cultural and social influences on pain and pain management  - Notify physician/advanced practitioner if interventions unsuccessful or patient reports new pain  9/16/2022 1102 by Prasad Gambino RN  Outcome: Completed  9/16/2022 0737 by Prasad Gambino RN  Outcome: Progressing     Problem: INFECTION - ADULT  Goal: Absence or prevention of progression during hospitalization  Description: INTERVENTIONS:  - Assess and monitor for signs and symptoms of infection  - Monitor lab/diagnostic results  - Monitor all insertion sites, i e  indwelling lines, tubes, and drains  - Monitor endotracheal if appropriate and nasal secretions for changes in amount and color  - [place] appropriate cooling/warming therapies per order  - Administer medications as ordered  - Instruct and encourage patient and family to use good hand hygiene technique  - Identify and instruct in appropriate isolation precautions for identified infection/condition  9/16/2022 1102 by Christiane Melara RN  Outcome: Completed  9/16/2022 0737 by Christiane Melara RN  Outcome: Progressing  Goal: Absence of fever/infection during neutropenic period  Description: INTERVENTIONS:  - Monitor WBC    Outcome: Completed     Problem: SAFETY ADULT  Goal: Maintain or return to baseline ADL function  Description: INTERVENTIONS:  -  Assess patient's ability to carry out ADLs; assess patient's baseline for ADL function and identify physical deficits which impact ability to perform ADLs (bathing, care of mouth/teeth, toileting, grooming, dressing, etc )  - Assess/evaluate cause of self-care deficits   - Assess range of motion  - Assess patient's mobility; develop plan if impaired  - Assess patient's need for assistive devices and provide as appropriate  - Encourage maximum independence but intervene and supervise when necessary  - Involve family in performance of ADLs  - Assess for home care needs following discharge   - Consider OT consult to assist with ADL evaluation and planning for discharge  - Provide patient education as appropriate  9/16/2022 1102 by Christiane Melara RN  Outcome: Completed  9/16/2022 0737 by Christiane Melara RN  Outcome: Progressing  Goal: Maintains/Returns to pre admission functional level  Description: INTERVENTIONS:  - Perform BMAT or MOVE assessment daily    - Set and communicate daily mobility goal to care team and patient/family/caregiver     - Collaborate with rehabilitation services on mobility goals if consulted  - Out of bed for toileting  - Record patient progress and toleration of activity level   Outcome: Completed  Goal: Patient will remain free of falls  Description: INTERVENTIONS:  - Educate patient/family on patient safety including physical limitations  - Instruct patient to call for assistance with activity   - Consult OT/PT to assist with strengthening/mobility   - Keep Call bell within reach  - Keep bed low and locked with side rails adjusted as appropriate  - Keep care items and personal belongings within reach  - Initiate and maintain comfort rounds  - Apply yellow socks and bracelet for high fall risk patients  - Consider moving patient to room near nurses station  9/16/2022 1102 by Christiane Melara RN  Outcome: Completed  9/16/2022 0737 by Christiane Melara RN  Outcome: Progressing     Problem: DISCHARGE PLANNING  Goal: Discharge to home or other facility with appropriate resources  Description: INTERVENTIONS:  - Identify barriers to discharge w/patient and caregiver  - Arrange for needed discharge resources and transportation as appropriate  - Identify discharge learning needs (meds, wound care, etc )  - Arrange for interpretive services to assist at discharge as needed  - Refer to Case Management Department for coordinating discharge planning if the patient needs post-hospital services based on physician/advanced practitioner order or complex needs related to functional status, cognitive ability, or social support system  9/16/2022 1102 by Christiane Melara RN  Outcome: Completed  9/16/2022 0737 by Christiane Melara RN  Outcome: Progressing     Problem: Knowledge Deficit  Goal: Patient/family/caregiver demonstrates understanding of disease process, treatment plan, medications, and discharge instructions  Description: Complete learning assessment and assess knowledge base    Interventions:  - Provide teaching at level of understanding  - Provide teaching via preferred learning methods  9/16/2022 1102 by Christiane Melara RN  Outcome: Completed  9/16/2022 0737 by Christiane Melara RN  Outcome: Progressing     Problem: Potential for Falls  Goal: Patient will remain free of falls  Description: INTERVENTIONS:  - Educate patient/family on patient safety including physical limitations  - Instruct patient to call for assistance with activity   - Consult OT/PT to assist with strengthening/mobility   - Keep Call bell within reach  - Keep bed low and locked with side rails adjusted as appropriate  - Keep care items and personal belongings within reach  - Initiate and maintain comfort rounds  - Make Fall Risk Sign visible to staff  - Apply yellow socks and bracelet for high fall risk patients  - Consider moving patient to room near nurses station  9/16/2022 1102 by Chaitanya Brice RN  Outcome: Completed  9/16/2022 0737 by Chaitanya Brice RN  Outcome: Progressing

## 2022-09-16 NOTE — PLAN OF CARE
Problem: MOBILITY - ADULT  Goal: Maintain or return to baseline ADL function  Description: INTERVENTIONS:  -  Assess patient's ability to carry out ADLs; assess patient's baseline for ADL function and identify physical deficits which impact ability to perform ADLs (bathing, care of mouth/teeth, toileting, grooming, dressing, etc )  - Assess/evaluate cause of self-care deficits   - Assess range of motion  - Assess patient's mobility; develop plan if impaired  - Assess patient's need for assistive devices and provide as appropriate  - Encourage maximum independence but intervene and supervise when necessary  - Involve family in performance of ADLs  - Assess for home care needs following discharge   - Consider OT consult to assist with ADL evaluation and planning for discharge  - Provide patient education as appropriate  9/15/2022 2245 by Jill Garcia RN  Outcome: Progressing  9/15/2022 1358 by Jill Garcia RN  Outcome: Progressing  Goal: Maintains/Returns to pre admission functional level  Description: INTERVENTIONS:  - Perform BMAT or MOVE assessment daily    - Set and communicate daily mobility goal to care team and patient/family/caregiver  - Collaborate with rehabilitation services on mobility goals if consulted  - Out of bed for toileting  - Record patient progress and toleration of activity level   9/15/2022 2245 by Jill Garcia RN  Outcome: Progressing  9/15/2022 1358 by Jill Garcia RN  Outcome: Progressing     Problem: Nutrition/Hydration-ADULT  Goal: Nutrient/Hydration intake appropriate for improving, restoring or maintaining nutritional needs  Description: Monitor and assess patient's nutrition/hydration status for malnutrition  Collaborate with interdisciplinary team and initiate plan and interventions as ordered  Monitor patient's weight and dietary intake as ordered or per policy  Utilize nutrition screening tool and intervene as necessary   Determine patient's food preferences and provide high-protein, high-caloric foods as appropriate       INTERVENTIONS:  - Monitor oral intake, urinary output, labs, and treatment plans  - Assess nutrition and hydration status and recommend course of action  - Evaluate amount of meals eaten  - Assist patient with eating if necessary   - Allow adequate time for meals  - Recommend/ encourage appropriate diets, oral nutritional supplements, and vitamin/mineral supplements  - Order, calculate, and assess calorie counts as needed  - Recommend, monitor, and adjust tube feedings and TPN/PPN based on assessed needs  - Assess need for intravenous fluids  - Provide specific nutrition/hydration education as appropriate  - Include patient/family/caregiver in decisions related to nutrition  9/15/2022 2245 by Cathleen Pate RN  Outcome: Progressing  9/15/2022 1358 by Cathleen Pate RN  Outcome: Progressing     Problem: PAIN - ADULT  Goal: Verbalizes/displays adequate comfort level or baseline comfort level  Description: Interventions:  - Encourage patient to monitor pain and request assistance  - Assess pain using appropriate pain scale  - Administer analgesics based on type and severity of pain and evaluate response  - Implement non-pharmacological measures as appropriate and evaluate response  - Consider cultural and social influences on pain and pain management  - Notify physician/advanced practitioner if interventions unsuccessful or patient reports new pain  9/15/2022 2245 by Cathleen Pate RN  Outcome: Progressing  9/15/2022 1358 by Cathleen Pate RN  Outcome: Progressing     Problem: INFECTION - ADULT  Goal: Absence or prevention of progression during hospitalization  Description: INTERVENTIONS:  - Assess and monitor for signs and symptoms of infection  - Monitor lab/diagnostic results  - Monitor all insertion sites, i e  indwelling lines, tubes, and drains  - Monitor endotracheal if appropriate and nasal secretions for changes in amount and color  - [place] appropriate cooling/warming therapies per order  - Administer medications as ordered  - Instruct and encourage patient and family to use good hand hygiene technique  - Identify and instruct in appropriate isolation precautions for identified infection/condition  9/15/2022 2245 by Jose Rausch RN  Outcome: Progressing  9/15/2022 1358 by Jose Rausch RN  Outcome: Progressing  Goal: Absence of fever/infection during neutropenic period  Description: INTERVENTIONS:  - Monitor WBC    9/15/2022 2245 by Jose Rausch RN  Outcome: Progressing  9/15/2022 1358 by Jose Rausch RN  Outcome: Progressing

## 2022-09-16 NOTE — PLAN OF CARE
Problem: MOBILITY - ADULT  Goal: Maintain or return to baseline ADL function  Description: INTERVENTIONS:  -  Assess patient's ability to carry out ADLs; assess patient's baseline for ADL function and identify physical deficits which impact ability to perform ADLs (bathing, care of mouth/teeth, toileting, grooming, dressing, etc )  - Assess/evaluate cause of self-care deficits   - Assess range of motion  - Assess patient's mobility; develop plan if impaired  - Assess patient's need for assistive devices and provide as appropriate  - Encourage maximum independence but intervene and supervise when necessary  - Involve family in performance of ADLs  - Assess for home care needs following discharge   - Consider OT consult to assist with ADL evaluation and planning for discharge  - Provide patient education as appropriate  Outcome: Progressing  Goal: Maintains/Returns to pre admission functional level  Description: INTERVENTIONS:  - Perform BMAT or MOVE assessment daily    - Set and communicate daily mobility goal to care team and patient/family/caregiver  - Collaborate with rehabilitation services on mobility goals if consulted    - Out of bed for toileting  - Record patient progress and toleration of activity level   Outcome: Progressing     Problem: Nutrition/Hydration-ADULT  Goal: Nutrient/Hydration intake appropriate for improving, restoring or maintaining nutritional needs  Description: Monitor and assess patient's nutrition/hydration status for malnutrition  Collaborate with interdisciplinary team and initiate plan and interventions as ordered  Monitor patient's weight and dietary intake as ordered or per policy  Utilize nutrition screening tool and intervene as necessary  Determine patient's food preferences and provide high-protein, high-caloric foods as appropriate       INTERVENTIONS:  - Monitor oral intake, urinary output, labs, and treatment plans  - Assess nutrition and hydration status and recommend course of action  - Evaluate amount of meals eaten  - Assist patient with eating if necessary   - Allow adequate time for meals  - Recommend/ encourage appropriate diets, oral nutritional supplements, and vitamin/mineral supplements  - Order, calculate, and assess calorie counts as needed  - Recommend, monitor, and adjust tube feedings and TPN/PPN based on assessed needs  - Assess need for intravenous fluids  - Provide specific nutrition/hydration education as appropriate  - Include patient/family/caregiver in decisions related to nutrition  Outcome: Progressing     Problem: PAIN - ADULT  Goal: Verbalizes/displays adequate comfort level or baseline comfort level  Description: Interventions:  - Encourage patient to monitor pain and request assistance  - Assess pain using appropriate pain scale  - Administer analgesics based on type and severity of pain and evaluate response  - Implement non-pharmacological measures as appropriate and evaluate response  - Consider cultural and social influences on pain and pain management  - Notify physician/advanced practitioner if interventions unsuccessful or patient reports new pain  Outcome: Progressing     Problem: INFECTION - ADULT  Goal: Absence or prevention of progression during hospitalization  Description: INTERVENTIONS:  - Assess and monitor for signs and symptoms of infection  - Monitor lab/diagnostic results  - Monitor all insertion sites, i e  indwelling lines, tubes, and drains  - Monitor endotracheal if appropriate and nasal secretions for changes in amount and color  - [place] appropriate cooling/warming therapies per order  - Administer medications as ordered  - Instruct and encourage patient and family to use good hand hygiene technique  - Identify and instruct in appropriate isolation precautions for identified infection/condition  Outcome: Progressing  Goal: Absence of fever/infection during neutropenic period  Description: INTERVENTIONS:  - Monitor WBC    Outcome: Progressing     Problem: SAFETY ADULT  Goal: Maintain or return to baseline ADL function  Description: INTERVENTIONS:  -  Assess patient's ability to carry out ADLs; assess patient's baseline for ADL function and identify physical deficits which impact ability to perform ADLs (bathing, care of mouth/teeth, toileting, grooming, dressing, etc )  - Assess/evaluate cause of self-care deficits   - Assess range of motion  - Assess patient's mobility; develop plan if impaired  - Assess patient's need for assistive devices and provide as appropriate  - Encourage maximum independence but intervene and supervise when necessary  - Involve family in performance of ADLs  - Assess for home care needs following discharge   - Consider OT consult to assist with ADL evaluation and planning for discharge  - Provide patient education as appropriate  Outcome: Progressing  Goal: Maintains/Returns to pre admission functional level  Description: INTERVENTIONS:  - Perform BMAT or MOVE assessment daily    - Set and communicate daily mobility goal to care team and patient/family/caregiver  - Collaborate with rehabilitation services on mobility goals if consulted  - Perform Range of Motion 3 times a day  - Reposition patient every 2 hours    - Dangle patient 3 times a day  - Stand patient 3 times a day  - Ambulate patient 3 times a day  - Out of bed to chair 3 times a day   - Out of bed for meals 3 times a day  - Out of bed for toileting  - Record patient progress and toleration of activity level   Outcome: Progressing  Goal: Patient will remain free of falls  Description: INTERVENTIONS:  - Educate patient/family on patient safety including physical limitations  - Instruct patient to call for assistance with activity   - Consult OT/PT to assist with strengthening/mobility   - Keep Call bell within reach  - Keep bed low and locked with side rails adjusted as appropriate  - Keep care items and personal belongings within reach  - Initiate and maintain comfort rounds  - Make Fall Risk Sign visible to staff  -  - Apply yellow socks and bracelet for high fall risk patients  - Consider moving patient to room near nurses station  Outcome: Progressing     Problem: DISCHARGE PLANNING  Goal: Discharge to home or other facility with appropriate resources  Description: INTERVENTIONS:  - Identify barriers to discharge w/patient and caregiver  - Arrange for needed discharge resources and transportation as appropriate  - Identify discharge learning needs (meds, wound care, etc )  - Arrange for interpretive services to assist at discharge as needed  - Refer to Case Management Department for coordinating discharge planning if the patient needs post-hospital services based on physician/advanced practitioner order or complex needs related to functional status, cognitive ability, or social support system  Outcome: Progressing     Problem: Knowledge Deficit  Goal: Patient/family/caregiver demonstrates understanding of disease process, treatment plan, medications, and discharge instructions  Description: Complete learning assessment and assess knowledge base    Interventions:  - Provide teaching at level of understanding  - Provide teaching via preferred learning methods  Outcome: Progressing     Problem: Potential for Falls  Goal: Patient will remain free of falls  Description: INTERVENTIONS:  - Educate patient/family on patient safety including physical limitations  - Instruct patient to call for assistance with activity   - Consult OT/PT to assist with strengthening/mobility   - Keep Call bell within reach  - Keep bed low and locked with side rails adjusted as appropriate  - Keep care items and personal belongings within reach  - Initiate and maintain comfort rounds  - Make Fall Risk Sign visible to staff    - Apply yellow socks and bracelet for high fall risk patients  - Consider moving patient to room near nurses station  Outcome: Progressing

## 2022-09-16 NOTE — NURSING NOTE
Instructed patient on rom care; pt verbally understands; all questions answered; supplies given; pt also being discharged with packing to her wound; per pt "the dr gave me the instructions"; pt declined other instructions on packing care at this time; supplies for packing also given

## 2022-09-19 ENCOUNTER — HOSPITAL ENCOUNTER (INPATIENT)
Facility: HOSPITAL | Age: 42
LOS: 3 days | Discharge: HOME HEALTH | DRG: 405 | End: 2022-09-22
Attending: STUDENT IN AN ORGANIZED HEALTH CARE EDUCATION/TRAINING PROGRAM | Admitting: STUDENT IN AN ORGANIZED HEALTH CARE EDUCATION/TRAINING PROGRAM
Payer: COMMERCIAL

## 2022-09-19 ENCOUNTER — HOSPITAL ENCOUNTER (OUTPATIENT)
Dept: RADIOLOGY | Facility: HOSPITAL | Age: 42
Discharge: HOME | End: 2022-09-19
Payer: COMMERCIAL

## 2022-09-19 ENCOUNTER — APPOINTMENT (OUTPATIENT)
Dept: LAB | Facility: CLINIC | Age: 42
End: 2022-09-19
Payer: COMMERCIAL

## 2022-09-19 ENCOUNTER — OFFICE VISIT (OUTPATIENT)
Dept: SURGERY | Facility: CLINIC | Age: 42
End: 2022-09-19

## 2022-09-19 VITALS
OXYGEN SATURATION: 98 % | DIASTOLIC BLOOD PRESSURE: 66 MMHG | SYSTOLIC BLOOD PRESSURE: 113 MMHG | WEIGHT: 117 LBS | TEMPERATURE: 98.1 F | HEART RATE: 103 BPM | RESPIRATION RATE: 16 BRPM | BODY MASS INDEX: 19.97 KG/M2 | HEIGHT: 64 IN

## 2022-09-19 DIAGNOSIS — S36.209A: Primary | ICD-10-CM

## 2022-09-19 DIAGNOSIS — S36.209A: ICD-10-CM

## 2022-09-19 DIAGNOSIS — R18.8 INTRAABDOMINAL FLUID COLLECTION: Primary | ICD-10-CM

## 2022-09-19 LAB
ANION GAP SERPL CALCULATED.3IONS-SCNC: 4 MMOL/L (ref 4–13)
BASOPHILS # BLD AUTO: 0.05 THOUSANDS/ÂΜL (ref 0–0.1)
BASOPHILS NFR BLD AUTO: 0 % (ref 0–1)
BUN SERPL-MCNC: 13 MG/DL (ref 5–25)
CALCIUM SERPL-MCNC: 9 MG/DL (ref 8.3–10.1)
CHLORIDE SERPL-SCNC: 102 MMOL/L (ref 96–108)
CO2 SERPL-SCNC: 29 MMOL/L (ref 21–32)
CREAT SERPL-MCNC: 0.76 MG/DL (ref 0.6–1.3)
EOSINOPHIL # BLD AUTO: 0.23 THOUSAND/ÂΜL (ref 0–0.61)
EOSINOPHIL NFR BLD AUTO: 1 % (ref 0–6)
ERYTHROCYTE [DISTWIDTH] IN BLOOD BY AUTOMATED COUNT: 13.1 % (ref 11.6–15.1)
GFR SERPL CREATININE-BSD FRML MDRD: 96 ML/MIN/1.73SQ M
GLUCOSE SERPL-MCNC: 111 MG/DL (ref 65–140)
GLUCOSE SERPL-MCNC: 119 MG/DL (ref 65–140)
HCT VFR BLD AUTO: 36.1 % (ref 34.8–46.1)
HGB BLD-MCNC: 11.5 G/DL (ref 11.5–15.4)
IMM GRANULOCYTES # BLD AUTO: 0.12 THOUSAND/UL (ref 0–0.2)
IMM GRANULOCYTES NFR BLD AUTO: 1 % (ref 0–2)
LYMPHOCYTES # BLD AUTO: 1.46 THOUSANDS/ÂΜL (ref 0.6–4.47)
LYMPHOCYTES NFR BLD AUTO: 8 % (ref 14–44)
MCH RBC QN AUTO: 30.3 PG (ref 26.8–34.3)
MCHC RBC AUTO-ENTMCNC: 31.9 G/DL (ref 31.4–37.4)
MCV RBC AUTO: 95 FL (ref 82–98)
MONOCYTES # BLD AUTO: 2.21 THOUSAND/ÂΜL (ref 0.17–1.22)
MONOCYTES NFR BLD AUTO: 12 % (ref 4–12)
NEUTROPHILS # BLD AUTO: 13.71 THOUSANDS/ÂΜL (ref 1.85–7.62)
NEUTS SEG NFR BLD AUTO: 78 % (ref 43–75)
NRBC BLD AUTO-RTO: 0 /100 WBCS
PLATELET # BLD AUTO: 627 THOUSANDS/UL (ref 149–390)
PMV BLD AUTO: 9.3 FL (ref 8.9–12.7)
POTASSIUM SERPL-SCNC: 4.5 MMOL/L (ref 3.5–5.3)
RBC # BLD AUTO: 3.79 MILLION/UL (ref 3.81–5.12)
SODIUM SERPL-SCNC: 135 MMOL/L (ref 135–147)
WBC # BLD AUTO: 17.78 THOUSAND/UL (ref 4.31–10.16)

## 2022-09-19 PROCEDURE — 36415 COLL VENOUS BLD VENIPUNCTURE: CPT

## 2022-09-19 PROCEDURE — 88307 TISSUE EXAM BY PATHOLOGIST: CPT | Performed by: PATHOLOGY

## 2022-09-19 PROCEDURE — 82948 REAGENT STRIP/BLOOD GLUCOSE: CPT

## 2022-09-19 PROCEDURE — 99284 EMERGENCY DEPT VISIT MOD MDM: CPT

## 2022-09-19 PROCEDURE — G1004 CDSM NDSC: HCPCS

## 2022-09-19 PROCEDURE — NC001 PR NO CHARGE: Performed by: STUDENT IN AN ORGANIZED HEALTH CARE EDUCATION/TRAINING PROGRAM

## 2022-09-19 PROCEDURE — 99232 SBSQ HOSP IP/OBS MODERATE 35: CPT | Performed by: SURGERY

## 2022-09-19 PROCEDURE — 80048 BASIC METABOLIC PNL TOTAL CA: CPT

## 2022-09-19 PROCEDURE — 99024 POSTOP FOLLOW-UP VISIT: CPT | Performed by: STUDENT IN AN ORGANIZED HEALTH CARE EDUCATION/TRAINING PROGRAM

## 2022-09-19 PROCEDURE — 88305 TISSUE EXAM BY PATHOLOGIST: CPT | Performed by: PATHOLOGY

## 2022-09-19 PROCEDURE — 85025 COMPLETE CBC W/AUTO DIFF WBC: CPT

## 2022-09-19 PROCEDURE — 74177 CT ABD & PELVIS W/CONTRAST: CPT

## 2022-09-19 RX ORDER — METHOCARBAMOL 500 MG/1
1000 TABLET, FILM COATED ORAL EVERY 6 HOURS SCHEDULED
Status: DISCONTINUED | OUTPATIENT
Start: 2022-09-19 | End: 2022-09-19

## 2022-09-19 RX ORDER — ACETAMINOPHEN 325 MG/1
975 TABLET ORAL EVERY 6 HOURS PRN
Status: DISCONTINUED | OUTPATIENT
Start: 2022-09-19 | End: 2022-09-19

## 2022-09-19 RX ORDER — HYDROMORPHONE HCL IN WATER/PF 6 MG/30 ML
0.2 PATIENT CONTROLLED ANALGESIA SYRINGE INTRAVENOUS
Status: DISCONTINUED | OUTPATIENT
Start: 2022-09-19 | End: 2022-09-22 | Stop reason: HOSPADM

## 2022-09-19 RX ORDER — METHOCARBAMOL 500 MG/1
1000 TABLET, FILM COATED ORAL EVERY 6 HOURS SCHEDULED
Status: DISCONTINUED | OUTPATIENT
Start: 2022-09-20 | End: 2022-09-22 | Stop reason: HOSPADM

## 2022-09-19 RX ORDER — METRONIDAZOLE 500 MG/100ML
500 INJECTION, SOLUTION INTRAVENOUS EVERY 8 HOURS
Status: COMPLETED | OUTPATIENT
Start: 2022-09-19 | End: 2022-09-20

## 2022-09-19 RX ORDER — OXYCODONE HYDROCHLORIDE 5 MG/1
5 TABLET ORAL EVERY 4 HOURS PRN
Status: DISCONTINUED | OUTPATIENT
Start: 2022-09-19 | End: 2022-09-22 | Stop reason: HOSPADM

## 2022-09-19 RX ORDER — ACETAMINOPHEN 325 MG/1
975 TABLET ORAL EVERY 8 HOURS SCHEDULED
Status: DISCONTINUED | OUTPATIENT
Start: 2022-09-19 | End: 2022-09-19

## 2022-09-19 RX ORDER — GABAPENTIN 400 MG/1
400 CAPSULE ORAL 4 TIMES DAILY
Status: DISCONTINUED | OUTPATIENT
Start: 2022-09-19 | End: 2022-09-22 | Stop reason: HOSPADM

## 2022-09-19 RX ORDER — ACETAMINOPHEN 325 MG/1
975 TABLET ORAL EVERY 6 HOURS SCHEDULED
Status: DISCONTINUED | OUTPATIENT
Start: 2022-09-20 | End: 2022-09-22 | Stop reason: HOSPADM

## 2022-09-19 RX ORDER — SODIUM CHLORIDE, SODIUM LACTATE, POTASSIUM CHLORIDE, CALCIUM CHLORIDE 600; 310; 30; 20 MG/100ML; MG/100ML; MG/100ML; MG/100ML
100 INJECTION, SOLUTION INTRAVENOUS CONTINUOUS
Status: DISCONTINUED | OUTPATIENT
Start: 2022-09-19 | End: 2022-09-20

## 2022-09-19 RX ORDER — ENOXAPARIN SODIUM 100 MG/ML
40 INJECTION SUBCUTANEOUS
Status: DISCONTINUED | OUTPATIENT
Start: 2022-09-19 | End: 2022-09-22 | Stop reason: HOSPADM

## 2022-09-19 RX ORDER — METHOCARBAMOL 500 MG/1
1000 TABLET, FILM COATED ORAL EVERY 6 HOURS PRN
Status: DISCONTINUED | OUTPATIENT
Start: 2022-09-19 | End: 2022-09-19

## 2022-09-19 RX ORDER — OXYCODONE HYDROCHLORIDE 5 MG/1
2.5 TABLET ORAL EVERY 4 HOURS PRN
Status: DISCONTINUED | OUTPATIENT
Start: 2022-09-19 | End: 2022-09-22 | Stop reason: HOSPADM

## 2022-09-19 RX ORDER — INSULIN LISPRO 100 [IU]/ML
1-5 INJECTION, SOLUTION INTRAVENOUS; SUBCUTANEOUS EVERY 6 HOURS SCHEDULED
Status: DISCONTINUED | OUTPATIENT
Start: 2022-09-19 | End: 2022-09-22 | Stop reason: HOSPADM

## 2022-09-19 RX ORDER — ONDANSETRON 2 MG/ML
4 INJECTION INTRAMUSCULAR; INTRAVENOUS EVERY 4 HOURS PRN
Status: DISCONTINUED | OUTPATIENT
Start: 2022-09-19 | End: 2022-09-22 | Stop reason: HOSPADM

## 2022-09-19 RX ADMIN — GABAPENTIN 400 MG: 400 CAPSULE ORAL at 20:22

## 2022-09-19 RX ADMIN — ENOXAPARIN SODIUM 40 MG: 40 INJECTION SUBCUTANEOUS at 18:26

## 2022-09-19 RX ADMIN — CEFEPIME 2000 MG: 2 INJECTION, POWDER, FOR SOLUTION INTRAVENOUS at 17:37

## 2022-09-19 RX ADMIN — METRONIDAZOLE 500 MG: 500 INJECTION, SOLUTION INTRAVENOUS at 18:24

## 2022-09-19 RX ADMIN — GABAPENTIN 400 MG: 400 CAPSULE ORAL at 22:18

## 2022-09-19 RX ADMIN — SODIUM CHLORIDE, SODIUM LACTATE, POTASSIUM CHLORIDE, AND CALCIUM CHLORIDE 100 ML/HR: .6; .31; .03; .02 INJECTION, SOLUTION INTRAVENOUS at 19:04

## 2022-09-19 RX ADMIN — IOHEXOL 99 ML: 350 INJECTION, SOLUTION INTRAVENOUS at 14:53

## 2022-09-19 RX ADMIN — OXYCODONE HYDROCHLORIDE 5 MG: 5 TABLET ORAL at 22:32
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
## 2022-09-19 NOTE — PATIENT INSTRUCTIONS
- Please obtain labs BMP/CBC  - A CT abdomen and pelvis with IV contrast was ordered  You should complete this scan at 2:30 pm today 9/19 at the [facility]
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
## 2022-09-20 ENCOUNTER — HOME HEALTH ADMISSION (OUTPATIENT)
Dept: HOME HEALTH SERVICES | Facility: HOME HEALTHCARE | Age: 42
End: 2022-09-20
Payer: COMMERCIAL

## 2022-09-20 LAB
ANION GAP SERPL CALCULATED.3IONS-SCNC: 4 MMOL/L (ref 4–13)
BUN SERPL-MCNC: 11 MG/DL (ref 5–25)
CALCIUM SERPL-MCNC: 8.5 MG/DL (ref 8.3–10.1)
CHLORIDE SERPL-SCNC: 104 MMOL/L (ref 96–108)
CO2 SERPL-SCNC: 28 MMOL/L (ref 21–32)
CREAT SERPL-MCNC: 0.62 MG/DL (ref 0.6–1.3)
ERYTHROCYTE [DISTWIDTH] IN BLOOD BY AUTOMATED COUNT: 13.1 % (ref 11.6–15.1)
GFR SERPL CREATININE-BSD FRML MDRD: 110 ML/MIN/1.73SQ M
GLUCOSE SERPL-MCNC: 105 MG/DL (ref 65–140)
GLUCOSE SERPL-MCNC: 107 MG/DL (ref 65–140)
GLUCOSE SERPL-MCNC: 118 MG/DL (ref 65–140)
GLUCOSE SERPL-MCNC: 95 MG/DL (ref 65–140)
GLUCOSE SERPL-MCNC: 99 MG/DL (ref 65–140)
HCT VFR BLD AUTO: 32.2 % (ref 34.8–46.1)
HGB BLD-MCNC: 10.1 G/DL (ref 11.5–15.4)
MAGNESIUM SERPL-MCNC: 2.4 MG/DL (ref 1.6–2.6)
MCH RBC QN AUTO: 30.4 PG (ref 26.8–34.3)
MCHC RBC AUTO-ENTMCNC: 31.4 G/DL (ref 31.4–37.4)
MCV RBC AUTO: 97 FL (ref 82–98)
PLATELET # BLD AUTO: 604 THOUSANDS/UL (ref 149–390)
PMV BLD AUTO: 9.3 FL (ref 8.9–12.7)
POTASSIUM SERPL-SCNC: 4.2 MMOL/L (ref 3.5–5.3)
RBC # BLD AUTO: 3.32 MILLION/UL (ref 3.81–5.12)
SODIUM SERPL-SCNC: 136 MMOL/L (ref 135–147)
WBC # BLD AUTO: 15.52 THOUSAND/UL (ref 4.31–10.16)

## 2022-09-20 PROCEDURE — 83735 ASSAY OF MAGNESIUM: CPT

## 2022-09-20 PROCEDURE — 80048 BASIC METABOLIC PNL TOTAL CA: CPT

## 2022-09-20 PROCEDURE — 85027 COMPLETE CBC AUTOMATED: CPT

## 2022-09-20 PROCEDURE — 99231 SBSQ HOSP IP/OBS SF/LOW 25: CPT | Performed by: SURGERY

## 2022-09-20 PROCEDURE — 82948 REAGENT STRIP/BLOOD GLUCOSE: CPT

## 2022-09-20 RX ORDER — DOCUSATE SODIUM 100 MG/1
100 CAPSULE, LIQUID FILLED ORAL 2 TIMES DAILY
Status: DISCONTINUED | OUTPATIENT
Start: 2022-09-20 | End: 2022-09-22 | Stop reason: HOSPADM

## 2022-09-20 RX ORDER — METRONIDAZOLE 500 MG/1
500 TABLET ORAL EVERY 8 HOURS SCHEDULED
Status: DISCONTINUED | OUTPATIENT
Start: 2022-09-20 | End: 2022-09-22 | Stop reason: HOSPADM

## 2022-09-20 RX ADMIN — ACETAMINOPHEN 975 MG: 325 TABLET, FILM COATED ORAL at 17:52

## 2022-09-20 RX ADMIN — GABAPENTIN 400 MG: 400 CAPSULE ORAL at 08:38

## 2022-09-20 RX ADMIN — METHOCARBAMOL TABLETS 1000 MG: 500 TABLET, COATED ORAL at 11:44

## 2022-09-20 RX ADMIN — DOCUSATE SODIUM 100 MG: 100 CAPSULE, LIQUID FILLED ORAL at 17:51

## 2022-09-20 RX ADMIN — SODIUM CHLORIDE, SODIUM LACTATE, POTASSIUM CHLORIDE, AND CALCIUM CHLORIDE 100 ML/HR: .6; .31; .03; .02 INJECTION, SOLUTION INTRAVENOUS at 08:39

## 2022-09-20 RX ADMIN — ACETAMINOPHEN 975 MG: 325 TABLET, FILM COATED ORAL at 11:43

## 2022-09-20 RX ADMIN — CEFEPIME 2000 MG: 2 INJECTION, POWDER, FOR SOLUTION INTRAVENOUS at 05:38

## 2022-09-20 RX ADMIN — OXYCODONE HYDROCHLORIDE 5 MG: 5 TABLET ORAL at 17:51

## 2022-09-20 RX ADMIN — GABAPENTIN 400 MG: 400 CAPSULE ORAL at 11:44

## 2022-09-20 RX ADMIN — METHOCARBAMOL TABLETS 1000 MG: 500 TABLET, COATED ORAL at 00:06

## 2022-09-20 RX ADMIN — METRONIDAZOLE 500 MG: 500 INJECTION, SOLUTION INTRAVENOUS at 08:39

## 2022-09-20 RX ADMIN — DOCUSATE SODIUM 100 MG: 100 CAPSULE, LIQUID FILLED ORAL at 11:44

## 2022-09-20 RX ADMIN — ENOXAPARIN SODIUM 40 MG: 40 INJECTION SUBCUTANEOUS at 17:51

## 2022-09-20 RX ADMIN — ACETAMINOPHEN 975 MG: 325 TABLET, FILM COATED ORAL at 05:37

## 2022-09-20 RX ADMIN — METRONIDAZOLE 500 MG: 500 INJECTION, SOLUTION INTRAVENOUS at 00:10

## 2022-09-20 RX ADMIN — METHOCARBAMOL TABLETS 1000 MG: 500 TABLET, COATED ORAL at 05:37

## 2022-09-20 RX ADMIN — METHOCARBAMOL TABLETS 1000 MG: 500 TABLET, COATED ORAL at 17:51

## 2022-09-20 RX ADMIN — GABAPENTIN 400 MG: 400 CAPSULE ORAL at 20:39

## 2022-09-20 RX ADMIN — ACETAMINOPHEN 975 MG: 325 TABLET, FILM COATED ORAL at 00:06

## 2022-09-20 RX ADMIN — GABAPENTIN 400 MG: 400 CAPSULE ORAL at 17:51

## 2022-09-20 RX ADMIN — METRONIDAZOLE 500 MG: 500 INJECTION, SOLUTION INTRAVENOUS at 18:51

## 2022-09-20 RX ADMIN — CEFEPIME 2000 MG: 2 INJECTION, POWDER, FOR SOLUTION INTRAVENOUS at 17:52
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
## 2022-09-20 NOTE — QUICK NOTE
Wound Care Note:  Nugauze removed  Patient wound with 3 open areas  Clear fluid drainage noted and small amount serous sang  Wound clean, fascia appears intact  Repacked  Patient tolerated well  Abd binder placed over top
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
## 2022-09-20 NOTE — PLAN OF CARE
Problem: PAIN - ADULT  Goal: Verbalizes/displays adequate comfort level or baseline comfort level  Description: Interventions:  - Encourage patient to monitor pain and request assistance  - Assess pain using appropriate pain scale  - Administer analgesics based on type and severity of pain and evaluate response  - Implement non-pharmacological measures as appropriate and evaluate response  - Consider cultural and social influences on pain and pain management  - Notify physician/advanced practitioner if interventions unsuccessful or patient reports new pain  Outcome: Progressing     Problem: INFECTION - ADULT  Goal: Absence or prevention of progression during hospitalization  Description: INTERVENTIONS:  - Assess and monitor for signs and symptoms of infection  - Monitor lab/diagnostic results  - Monitor all insertion sites, i e  indwelling lines, tubes, and drains  - Monitor endotracheal if appropriate and nasal secretions for changes in amount and color  - [place] appropriate cooling/warming therapies per order  - Administer medications as ordered  - Instruct and encourage patient and family to use good hand hygiene technique  - Identify and instruct in appropriate isolation precautions for identified infection/condition  Outcome: Progressing  Goal: Absence of fever/infection during neutropenic period  Description: INTERVENTIONS:  - Monitor WBC    Outcome: Progressing     Problem: SAFETY ADULT  Goal: Patient will remain free of falls  Description: INTERVENTIONS:  - Educate patient/family on patient safety including physical limitations  - Instruct patient to call for assistance with activity   - Consult OT/PT to assist with strengthening/mobility   - Keep Call bell within reach  - Keep bed low and locked with side rails adjusted as appropriate  - Keep care items and personal belongings within reach  - Initiate and maintain comfort rounds  - Make Fall Risk Sign visible to staff  - Apply yellow socks and bracelet for high fall risk patients  - Consider moving patient to room near nurses station  Outcome: Progressing  Goal: Maintain or return to baseline ADL function  Description: INTERVENTIONS:  -  Assess patient's ability to carry out ADLs; assess patient's baseline for ADL function and identify physical deficits which impact ability to perform ADLs (bathing, care of mouth/teeth, toileting, grooming, dressing, etc )  - Assess/evaluate cause of self-care deficits   - Assess range of motion  - Assess patient's mobility; develop plan if impaired  - Assess patient's need for assistive devices and provide as appropriate  - Encourage maximum independence but intervene and supervise when necessary  - Involve family in performance of ADLs  - Assess for home care needs following discharge   - Consider OT consult to assist with ADL evaluation and planning for discharge  - Provide patient education as appropriate  Outcome: Progressing  Goal: Maintains/Returns to pre admission functional level  Description: INTERVENTIONS:  - Perform BMAT or MOVE assessment daily    - Set and communicate daily mobility goal to care team and patient/family/caregiver     - Collaborate with rehabilitation services on mobility goals if consulted  - Out of bed for toileting  - Record patient progress and toleration of activity level   Outcome: Progressing     Problem: DISCHARGE PLANNING  Goal: Discharge to home or other facility with appropriate resources  Description: INTERVENTIONS:  - Identify barriers to discharge w/patient and caregiver  - Arrange for needed discharge resources and transportation as appropriate  - Identify discharge learning needs (meds, wound care, etc )  - Arrange for interpretive services to assist at discharge as needed  - Refer to Case Management Department for coordinating discharge planning if the patient needs post-hospital services based on physician/advanced practitioner order or complex needs related to functional status, cognitive ability, or social support system  Outcome: Progressing     Problem: Knowledge Deficit  Goal: Patient/family/caregiver demonstrates understanding of disease process, treatment plan, medications, and discharge instructions  Description: Complete learning assessment and assess knowledge base    Interventions:  - Provide teaching at level of understanding  - Provide teaching via preferred learning methods  Outcome: Progressing

## 2022-09-20 NOTE — PROGRESS NOTES
The metronidazole has / have been converted to Oral starting 9/20 evening per [facility] IV-to-PO Auto-Conversion Protocol for Adults as approved by the Pharmacy and Therapeutics Committee  The patient met all eligible criteria:  3 Age = 25years old   2) Received at least one dose of the IV form   3) Receiving at least one other scheduled oral/enteral medication   4) Tolerating an oral/enteral diet   and did not have any exclusions:   1) Critical care patient   2) Active GI bleed (IF assessing H2RAs or PPIs)   3) Continuous tube feeding (IF assessing cipro, doxycycline, levofloxacin, minocycline, rifampin, or voriconazole)   4) Receiving PO vancomycin (IF assessing metronidazole)   5) Persistent nausea and/or vomiting   6) Ileus or gastrointestinal obstruction   7) Gabe/nasogastric tube set for continuous suction   8) Specific order not to automatically convert to PO (in the order's comments or if discussed in the most recent Infectious Disease or primary team's progress notes)       Bo Chowdhury, PharmD
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
## 2022-09-20 NOTE — PLAN OF CARE
Problem: PAIN - ADULT  Goal: Verbalizes/displays adequate comfort level or baseline comfort level  Description: Interventions:  - Encourage patient to monitor pain and request assistance  - Assess pain using appropriate pain scale  - Administer analgesics based on type and severity of pain and evaluate response  - Implement non-pharmacological measures as appropriate and evaluate response  - Consider cultural and social influences on pain and pain management  - Notify physician/advanced practitioner if interventions unsuccessful or patient reports new pain  Outcome: Progressing     Problem: INFECTION - ADULT  Goal: Absence or prevention of progression during hospitalization  Description: INTERVENTIONS:  - Assess and monitor for signs and symptoms of infection  - Monitor lab/diagnostic results  - Monitor all insertion sites, i e  indwelling lines, tubes, and drains  - Monitor endotracheal if appropriate and nasal secretions for changes in amount and color  - [place] appropriate cooling/warming therapies per order  - Administer medications as ordered  - Instruct and encourage patient and family to use good hand hygiene technique  - Identify and instruct in appropriate isolation precautions for identified infection/condition  Outcome: Progressing  Goal: Absence of fever/infection during neutropenic period  Description: INTERVENTIONS:  - Monitor WBC    Outcome: Progressing     Problem: SAFETY ADULT  Goal: Patient will remain free of falls  Description: INTERVENTIONS:  - Educate patient/family on patient safety including physical limitations  - Instruct patient to call for assistance with activity   - Consult OT/PT to assist with strengthening/mobility   - Keep Call bell within reach  - Keep bed low and locked with side rails adjusted as appropriate  - Keep care items and personal belongings within reach  - Initiate and maintain comfort rounds  - Make Fall Risk Sign visible to staff  - Offer Toileting every *2* Hours, in advance of need  - Initiate/Maintain **bed/chair *alarm  - Obtain necessary fall risk management equipment:   - Apply yellow socks and bracelet for high fall risk patients  - Consider moving patient to room near nurses station  Outcome: Progressing  Goal: Maintain or return to baseline ADL function  Description: INTERVENTIONS:  -  Assess patient's ability to carry out ADLs; assess patient's baseline for ADL function and identify physical deficits which impact ability to perform ADLs (bathing, care of mouth/teeth, toileting, grooming, dressing, etc )  - Assess/evaluate cause of self-care deficits   - Assess range of motion  - Assess patient's mobility; develop plan if impaired  - Assess patient's need for assistive devices and provide as appropriate  - Encourage maximum independence but intervene and supervise when necessary  - Involve family in performance of ADLs  - Assess for home care needs following discharge   - Consider OT consult to assist with ADL evaluation and planning for discharge  - Provide patient education as appropriate  Outcome: Progressing  Goal: Maintains/Returns to pre admission functional level  Description: INTERVENTIONS:  - Perform BMAT or MOVE assessment daily    - Set and communicate daily mobility goal to care team and patient/family/caregiver  - Collaborate with rehabilitation services on mobility goals if consulted  - Perform Range of Motion *3** times a day  - Reposition patient every *2** hours    - Dangle patient **3* times a day  - Stand patient *3** times a day  - Ambulate patient *3** times a day  - Out of bed to chair *3** times a day   - Out of bed for meals *3** times a day  - Out of bed for toileting  - Record patient progress and toleration of activity level   Outcome: Progressing     Problem: DISCHARGE PLANNING  Goal: Discharge to home or other facility with appropriate resources  Description: INTERVENTIONS:  - Identify barriers to discharge w/patient and caregiver  - Arrange for needed discharge resources and transportation as appropriate  - Identify discharge learning needs (meds, wound care, etc )  - Arrange for interpretive services to assist at discharge as needed  - Refer to Case Management Department for coordinating discharge planning if the patient needs post-hospital services based on physician/advanced practitioner order or complex needs related to functional status, cognitive ability, or social support system  Outcome: Progressing     Problem: Knowledge Deficit  Goal: Patient/family/caregiver demonstrates understanding of disease process, treatment plan, medications, and discharge instructions  Description: Complete learning assessment and assess knowledge base    Interventions:  - Provide teaching at level of understanding  - Provide teaching via preferred learning methods  Outcome: Progressing

## 2022-09-21 ENCOUNTER — TRANSITIONAL CARE MANAGEMENT (OUTPATIENT)
Dept: FAMILY MEDICINE CLINIC | Facility: CLINIC | Age: 42
End: 2022-09-21

## 2022-09-21 PROBLEM — R18.8 INTRA-ABDOMINAL FLUID COLLECTION: Status: ACTIVE | Noted: 2022-09-21

## 2022-09-21 LAB
ANION GAP SERPL CALCULATED.3IONS-SCNC: 3 MMOL/L (ref 4–13)
BUN SERPL-MCNC: 12 MG/DL (ref 5–25)
CALCIUM SERPL-MCNC: 8.8 MG/DL (ref 8.3–10.1)
CHLORIDE SERPL-SCNC: 104 MMOL/L (ref 96–108)
CO2 SERPL-SCNC: 30 MMOL/L (ref 21–32)
CREAT SERPL-MCNC: 0.63 MG/DL (ref 0.6–1.3)
ERYTHROCYTE [DISTWIDTH] IN BLOOD BY AUTOMATED COUNT: 13.1 % (ref 11.6–15.1)
GFR SERPL CREATININE-BSD FRML MDRD: 110 ML/MIN/1.73SQ M
GLUCOSE SERPL-MCNC: 103 MG/DL (ref 65–140)
GLUCOSE SERPL-MCNC: 105 MG/DL (ref 65–140)
GLUCOSE SERPL-MCNC: 107 MG/DL (ref 65–140)
GLUCOSE SERPL-MCNC: 108 MG/DL (ref 65–140)
GLUCOSE SERPL-MCNC: 119 MG/DL (ref 65–140)
GLUCOSE SERPL-MCNC: 134 MG/DL (ref 65–140)
HCT VFR BLD AUTO: 32.7 % (ref 34.8–46.1)
HGB BLD-MCNC: 10 G/DL (ref 11.5–15.4)
MAGNESIUM SERPL-MCNC: 2.3 MG/DL (ref 1.6–2.6)
MCH RBC QN AUTO: 29.9 PG (ref 26.8–34.3)
MCHC RBC AUTO-ENTMCNC: 30.6 G/DL (ref 31.4–37.4)
MCV RBC AUTO: 98 FL (ref 82–98)
PLATELET # BLD AUTO: 642 THOUSANDS/UL (ref 149–390)
PMV BLD AUTO: 9.3 FL (ref 8.9–12.7)
POTASSIUM SERPL-SCNC: 4 MMOL/L (ref 3.5–5.3)
RBC # BLD AUTO: 3.34 MILLION/UL (ref 3.81–5.12)
SODIUM SERPL-SCNC: 137 MMOL/L (ref 135–147)
WBC # BLD AUTO: 13.31 THOUSAND/UL (ref 4.31–10.16)

## 2022-09-21 PROCEDURE — 99232 SBSQ HOSP IP/OBS MODERATE 35: CPT | Performed by: PHYSICIAN ASSISTANT

## 2022-09-21 PROCEDURE — NC001 PR NO CHARGE: Performed by: STUDENT IN AN ORGANIZED HEALTH CARE EDUCATION/TRAINING PROGRAM

## 2022-09-21 PROCEDURE — 82948 REAGENT STRIP/BLOOD GLUCOSE: CPT

## 2022-09-21 PROCEDURE — 83735 ASSAY OF MAGNESIUM: CPT

## 2022-09-21 PROCEDURE — 85027 COMPLETE CBC AUTOMATED: CPT

## 2022-09-21 PROCEDURE — 80048 BASIC METABOLIC PNL TOTAL CA: CPT

## 2022-09-21 RX ADMIN — ACETAMINOPHEN 975 MG: 325 TABLET, FILM COATED ORAL at 06:17

## 2022-09-21 RX ADMIN — GABAPENTIN 400 MG: 400 CAPSULE ORAL at 23:06

## 2022-09-21 RX ADMIN — CEFEPIME 2000 MG: 2 INJECTION, POWDER, FOR SOLUTION INTRAVENOUS at 18:23

## 2022-09-21 RX ADMIN — GABAPENTIN 400 MG: 400 CAPSULE ORAL at 18:21

## 2022-09-21 RX ADMIN — ENOXAPARIN SODIUM 40 MG: 40 INJECTION SUBCUTANEOUS at 18:21

## 2022-09-21 RX ADMIN — METHOCARBAMOL TABLETS 1000 MG: 500 TABLET, COATED ORAL at 23:06

## 2022-09-21 RX ADMIN — METHOCARBAMOL TABLETS 1000 MG: 500 TABLET, COATED ORAL at 00:51

## 2022-09-21 RX ADMIN — DOCUSATE SODIUM 100 MG: 100 CAPSULE, LIQUID FILLED ORAL at 08:24

## 2022-09-21 RX ADMIN — METHOCARBAMOL TABLETS 1000 MG: 500 TABLET, COATED ORAL at 12:31

## 2022-09-21 RX ADMIN — CEFEPIME 2000 MG: 2 INJECTION, POWDER, FOR SOLUTION INTRAVENOUS at 06:17

## 2022-09-21 RX ADMIN — ACETAMINOPHEN 975 MG: 325 TABLET, FILM COATED ORAL at 00:51

## 2022-09-21 RX ADMIN — ACETAMINOPHEN 975 MG: 325 TABLET, FILM COATED ORAL at 23:06

## 2022-09-21 RX ADMIN — METHOCARBAMOL TABLETS 1000 MG: 500 TABLET, COATED ORAL at 06:17

## 2022-09-21 RX ADMIN — METRONIDAZOLE 500 MG: 500 TABLET ORAL at 08:23

## 2022-09-21 RX ADMIN — METRONIDAZOLE 500 MG: 500 TABLET ORAL at 18:21

## 2022-09-21 RX ADMIN — ACETAMINOPHEN 975 MG: 325 TABLET, FILM COATED ORAL at 18:21

## 2022-09-21 RX ADMIN — GABAPENTIN 400 MG: 400 CAPSULE ORAL at 12:31

## 2022-09-21 RX ADMIN — METHOCARBAMOL TABLETS 1000 MG: 500 TABLET, COATED ORAL at 18:20

## 2022-09-21 RX ADMIN — METRONIDAZOLE 500 MG: 500 TABLET ORAL at 23:06

## 2022-09-21 RX ADMIN — METRONIDAZOLE 500 MG: 500 TABLET ORAL at 01:55

## 2022-09-21 RX ADMIN — GABAPENTIN 400 MG: 400 CAPSULE ORAL at 08:24

## 2022-09-21 RX ADMIN — ACETAMINOPHEN 975 MG: 325 TABLET, FILM COATED ORAL at 12:31

## 2022-09-21 RX ADMIN — DOCUSATE SODIUM 100 MG: 100 CAPSULE, LIQUID FILLED ORAL at 18:21

## 2022-09-21 NOTE — PLAN OF CARE
Problem: PAIN - ADULT  Goal: Verbalizes/displays adequate comfort level or baseline comfort level  Description: Interventions:  - Encourage patient to monitor pain and request assistance  - Assess pain using appropriate pain scale  - Administer analgesics based on type and severity of pain and evaluate response  - Implement non-pharmacological measures as appropriate and evaluate response  - Consider cultural and social influences on pain and pain management  - Notify physician/advanced practitioner if interventions unsuccessful or patient reports new pain  Outcome: Progressing     Problem: INFECTION - ADULT  Goal: Absence or prevention of progression during hospitalization  Description: INTERVENTIONS:  - Assess and monitor for signs and symptoms of infection  - Monitor lab/diagnostic results  - Monitor all insertion sites, i e  indwelling lines, tubes, and drains  - Monitor endotracheal if appropriate and nasal secretions for changes in amount and color  - [place] appropriate cooling/warming therapies per order  - Administer medications as ordered  - Instruct and encourage patient and family to use good hand hygiene technique  - Identify and instruct in appropriate isolation precautions for identified infection/condition  Outcome: Progressing  Goal: Absence of fever/infection during neutropenic period  Description: INTERVENTIONS:  - Monitor WBC    Outcome: Progressing     Problem: SAFETY ADULT  Goal: Patient will remain free of falls  Description: INTERVENTIONS:  - Educate patient/family on patient safety including physical limitations  - Instruct patient to call for assistance with activity   - Consult OT/PT to assist with strengthening/mobility   - Keep Call bell within reach  - Keep bed low and locked with side rails adjusted as appropriate  - Keep care items and personal belongings within reach  - Initiate and maintain comfort rounds  - Make Fall Risk Sign visible to staff  - Offer Toileting every 2 Hours, in advance of need  - Initiate/Maintain alarm  - Obtain necessary fall risk management equipment:   - Apply yellow socks and bracelet for high fall risk patients  - Consider moving patient to room near nurses station  Outcome: Progressing  Goal: Maintain or return to baseline ADL function  Description: INTERVENTIONS:  -  Assess patient's ability to carry out ADLs; assess patient's baseline for ADL function and identify physical deficits which impact ability to perform ADLs (bathing, care of mouth/teeth, toileting, grooming, dressing, etc )  - Assess/evaluate cause of self-care deficits   - Assess range of motion  - Assess patient's mobility; develop plan if impaired  - Assess patient's need for assistive devices and provide as appropriate  - Encourage maximum independence but intervene and supervise when necessary  - Involve family in performance of ADLs  - Assess for home care needs following discharge   - Consider OT consult to assist with ADL evaluation and planning for discharge  - Provide patient education as appropriate  Outcome: Progressing  Goal: Maintains/Returns to pre admission functional level  Description: INTERVENTIONS:  - Perform BMAT or MOVE assessment daily    - Set and communicate daily mobility goal to care team and patient/family/caregiver  - Collaborate with rehabilitation services on mobility goals if consulted  - Perform Range of Motion 3 times a day  - Reposition patient every 2 hours    - Dangle patient 3 times a day  - Stand patient 3 times a day  - Ambulate patient 3 times a day  - Out of bed to chair 3 times a day   - Out of bed for meals 3 times a day  - Out of bed for toileting  - Record patient progress and toleration of activity level   Outcome: Progressing     Problem: DISCHARGE PLANNING  Goal: Discharge to home or other facility with appropriate resources  Description: INTERVENTIONS:  - Identify barriers to discharge w/patient and caregiver  - Arrange for needed discharge resources and transportation as appropriate  - Identify discharge learning needs (meds, wound care, etc )  - Arrange for interpretive services to assist at discharge as needed  - Refer to Case Management Department for coordinating discharge planning if the patient needs post-hospital services based on physician/advanced practitioner order or complex needs related to functional status, cognitive ability, or social support system  Outcome: Progressing     Problem: Knowledge Deficit  Goal: Patient/family/caregiver demonstrates understanding of disease process, treatment plan, medications, and discharge instructions  Description: Complete learning assessment and assess knowledge base    Interventions:  - Provide teaching at level of understanding  - Provide teaching via preferred learning methods  Outcome: Progressing     Problem: Potential for Falls  Goal: Patient will remain free of falls  Description: INTERVENTIONS:  - Educate patient/family on patient safety including physical limitations  - Instruct patient to call for assistance with activity   - Consult OT/PT to assist with strengthening/mobility   - Keep Call bell within reach  - Keep bed low and locked with side rails adjusted as appropriate  - Keep care items and personal belongings within reach  - Initiate and maintain comfort rounds  - Make Fall Risk Sign visible to staff  - Offer Toileting every 2 Hours, in advance of need  - Initiate/Maintain alarm  - Obtain necessary fall risk management equipment:   - Apply yellow socks and bracelet for high fall risk patients  - Consider moving patient to room near nurses station  Outcome: Progressing

## 2022-09-21 NOTE — DISCHARGE INSTRUCTIONS
Trauma and Acute Care Surgery Discharge Instructions    Please follow-up as instructed  Activity:  - No lifting greater than 20 pounds or strenuous physical activity or exercise for at least 4 weeks  - Walking and normal light activities are encouraged  - Normal daily activities including climbing steps are okay  - No driving until no longer using pain medications  Diet:    - You may resume your normal diet  Wound Care:  - May shower daily  No tub baths or swimming until cleared by your surgeon   - Wash incision gently with soap and water and pat dry  - Do not apply any creams or ointments unless instructed to do so by your surgeon   - Remove current dressings (cover dressing and packing)  Flush each open wounds with 10 mL of saline  Pack open wounds along midline abdominal incision once daily with dry 1" plain nu-gauze ribbon packing  Cover with dry abdominal pad  - MAIN Drain care: Please milk drain mornings and nights (and as needed)  Empty as needed and record daily output  Bring output records to follow-up appointment  - Continue to use abdominal binder with out of bed and active  Medications:    - You may resume all of your regular medications after discharge unless otherwise instructed  Please refer to your discharge medication list for further details  - Please take the pain medications as directed  - You may become constipated, especially if taking pain medications  You may take any over the counter stool softeners or laxatives as needed  Examples: Milk of Magnesia, Colace, Senna  Additional Instructions:  - If you have any questions or concerns after discharge please call the office   - Call office or return to ER if fever greater than 101, chills, persistent nausea/vomiting, worsening/uncontrollable pain, and/or increasing redness or purulent/foul smelling drainage from incision(s)

## 2022-09-21 NOTE — PLAN OF CARE
Problem: PAIN - ADULT  Goal: Verbalizes/displays adequate comfort level or baseline comfort level  Description: Interventions:  - Encourage patient to monitor pain and request assistance  - Assess pain using appropriate pain scale  - Administer analgesics based on type and severity of pain and evaluate response  - Implement non-pharmacological measures as appropriate and evaluate response  - Consider cultural and social influences on pain and pain management  - Notify physician/advanced practitioner if interventions unsuccessful or patient reports new pain  9/21/2022 0806 by Juli Duran RN  Outcome: Progressing  9/21/2022 0806 by Juli Duran RN  Outcome: Progressing     Problem: SAFETY ADULT  Goal: Patient will remain free of falls  Description: INTERVENTIONS:  - Educate patient/family on patient safety including physical limitations  - Instruct patient to call for assistance with activity   - Consult OT/PT to assist with strengthening/mobility   - Keep Call bell within reach  - Keep bed low and locked with side rails adjusted as appropriate  - Keep care items and personal belongings within reach  - Initiate and maintain comfort rounds  - Make Fall Risk Sign visible to staff  - Offer Toileting every  Hours, in advance of need  - Initiate/Maintain alarm  - Obtain necessary fall risk management equipment:   - Apply yellow socks and bracelet for high fall risk patients  - Consider moving patient to room near nurses station  9/21/2022 0806 by Juli Duran RN  Outcome: Progressing  9/21/2022 0806 by Juli Duran RN  Outcome: Progressing  Goal: Maintain or return to baseline ADL function  Description: INTERVENTIONS:  -  Assess patient's ability to carry out ADLs; assess patient's baseline for ADL function and identify physical deficits which impact ability to perform ADLs (bathing, care of mouth/teeth, toileting, grooming, dressing, etc )  - Assess/evaluate cause of self-care deficits   - Assess range of motion  - Assess patient's mobility; develop plan if impaired  - Assess patient's need for assistive devices and provide as appropriate  - Encourage maximum independence but intervene and supervise when necessary  - Involve family in performance of ADLs  - Assess for home care needs following discharge   - Consider OT consult to assist with ADL evaluation and planning for discharge  - Provide patient education as appropriate  9/21/2022 0806 by Gera Méndez RN  Outcome: Progressing  9/21/2022 0806 by Gera Méndez RN  Outcome: Progressing  Goal: Maintains/Returns to pre admission functional level  Description: INTERVENTIONS:  - Perform BMAT or MOVE assessment daily    - Set and communicate daily mobility goal to care team and patient/family/caregiver  - Collaborate with rehabilitation services on mobility goals if consulted  - Perform Range of Motion times a day  - Reposition patient every  hours    - Dangle patient  times a day  - Stand patient  times a day  - Ambulate patient times a day  - Out of bed to chair  times a day   - Out of bed for meals  times a day  - Out of bed for toileting  - Record patient progress and toleration of activity level   9/21/2022 0806 by Gera Méndez RN  Outcome: Progressing  9/21/2022 0806 by Gera Méndez RN  Outcome: Progressing     Problem: Potential for Falls  Goal: Patient will remain free of falls  Description: INTERVENTIONS:  - Educate patient/family on patient safety including physical limitations  - Instruct patient to call for assistance with activity   - Consult OT/PT to assist with strengthening/mobility   - Keep Call bell within reach  - Keep bed low and locked with side rails adjusted as appropriate  - Keep care items and personal belongings within reach  - Initiate and maintain comfort rounds  - Make Fall Risk Sign visible to staff  - Offer Toileting every  Hours, in advance of need  - Initiate/Maintainalarm  - Obtain necessary fall risk management equipment:   - Apply yellow socks and bracelet for high fall risk patients  - Consider moving patient to room near nurses station  9/21/2022 0806 by Jabari Reyes RN  Outcome: Progressing  9/21/2022 0806 by Jabari Reyes RN  Outcome: Progressing     Problem: GASTROINTESTINAL - ADULT  Goal: Minimal or absence of nausea and/or vomiting  Description: INTERVENTIONS:  - Administer IV fluids if ordered to ensure adequate hydration  - Maintain NPO status until nausea and vomiting are resolved  - Nasogastric tube if ordered  - Administer ordered antiemetic medications as needed  - Provide nonpharmacologic comfort measures as appropriate  - Advance diet as tolerated, if ordered  - Consider nutrition services referral to assist patient with adequate nutrition and appropriate food choices  Outcome: Progressing  Goal: Maintains or returns to baseline bowel function  Description: INTERVENTIONS:  - Assess bowel function  - Encourage oral fluids to ensure adequate hydration  - Administer IV fluids if ordered to ensure adequate hydration  - Administer ordered medications as needed  - Encourage mobilization and activity  - Consider nutritional services referral to assist patient with adequate nutrition and appropriate food choices  Outcome: Progressing  Goal: Maintains adequate nutritional intake  Description: INTERVENTIONS:  - Monitor percentage of each meal consumed  - Identify factors contributing to decreased intake, treat as appropriate  - Assist with meals as needed  - Monitor I&O, weight, and lab values if indicated  - Obtain nutrition services referral as needed  Outcome: Progressing  Goal: Establish and maintain optimal ostomy function  Description: INTERVENTIONS:  - Assess bowel function  - Encourage oral fluids to ensure adequate hydration  - Administer IV fluids if ordered to ensure adequate hydration   - Administer ordered medications as needed  - Encourage mobilization and activity  - Nutrition services referral to assist patient with appropriate food choices  - Assess stoma site  - Consider wound care consult   Outcome: Progressing  Goal: Oral mucous membranes remain intact  Description: INTERVENTIONS  - Assess oral mucosa and hygiene practices  - Implement preventative oral hygiene regimen  - Implement oral medicated treatments as ordered  - Initiate Nutrition services referral as needed  Outcome: Progressing     Problem: SKIN/TISSUE INTEGRITY - ADULT  Goal: Skin Integrity remains intact(Skin Breakdown Prevention)  Description: Assess:  -Perform Merrill assessment every   -Clean and moisturize skin every   -Inspect skin when repositioning, toileting, and assisting with ADLS  -Assess under medical devices such as every   -Assess extremities for adequate circulation and sensation     Bed Management:  -Have minimal linens on bed & keep smooth, unwrinkled  -Change linens as needed when moist or perspiring  -Avoid sitting or lying in one position for more than hours while in bed  -Keep HOB at degrees     Toileting:  -Offer bedside commode  -Assess for incontinence every   -Use incontinent care products after each incontinent episode such as     Activity:  -Mobilize patient  times a day  -Encourage activity and walks on unit  -Encourage or provide ROM exercises   -Turn and reposition patient every Hours  -Use appropriate equipment to lift or move patient in bed  -Instruct/ Assist with weight shifting every  when out of bed in chair  -Consider limitation of chair time  hour intervals    Skin Care:  -Avoid use of baby powder, tape, friction and shearing, hot water or constrictive clothing  -Relieve pressure over bony prominences using  -Do not massage red bony areas    Next Steps:  -Teach patient strategies to minimize risks such as    -Consider consults to  interdisciplinary teams   Outcome: Progressing  Goal: Incision(s), wounds(s) or drain site(s) healing without S/S of infection  Description: INTERVENTIONS  - Assess and document dressing, incision, wound bed, drain sites and surrounding tissue  - Provide patient and family education  - Perform skin care/dressing changes every   Outcome: Progressing  Goal: Pressure injury heals and does not worsen  Description: Interventions:  - Implement low air loss mattress or specialty surface (Criteria met)  - Apply silicone foam dressing  - Instruct/assist with weight shifting every  minutes when in chair   - Limit chair time to  hour intervals  - Use special pressure reducing interventions such as  when in chair   - Apply fecal or urinary incontinence containment device   - Perform passive or active ROM every   - Turn and reposition patient & offload bony prominences every hours   - Utilize friction reducing device or surface for transfers   - Consider consults to  interdisciplinary teams such as   - Use incontinent care products after each incontinent episode such as   - Consider nutrition services referral as needed  Outcome: Progressing

## 2022-09-21 NOTE — PROGRESS NOTES
Progress Note - General Surgery  : RASTA Red Surgery Resident on [facility] ROSALES Osman 35 y o  female MRN: [id number]  Unit/Bed#: [facility] 620-01 Encounter: [id number]      Assessment:  35 y o  female s/p DPS on 9/11  Examined in clinic with drainage from midline incision  CT AP obtained which showed an anterior abdominal fluid collection  Midline incision draining clear fluid  L MAIN drain: 72cc Serosanguinous    WBC: 13 from 15 5    Plan:  - Regular diet  - Trend CBC  - Cefepime/Flagyl (9/19-)  - Wound care  - PRN pain control and antiemetics  - SSI for hyperglycemia  - DVT ppx        Subjective: No acute events overnight  Afebrile, hemodynamically stable  Tolerating diet  Pain is controlled  No new complaints or concerns  Objective:   Temp:  [98 °F (36 7 °C)-100 4 °F (38 °C)] 98 1 °F (36 7 °C)  HR:  [69-92] 78  Resp:  [16-19] 19  BP: ()/(59-63) 94/59    Physical Exam:  General: No acute distress, alert and oriented  CV: Well perfused, regular rate and rhythm  Lungs: Normal work of breathing, no increased respiratory effort  Abdomen: Soft, non-tender, non-distended  Incision with three areas of superficial dehiscence, clear drainage  Extremities: No edema, clubbing or cyanosis  Skin: Warm, dry      I/O       09/19 0701  09/20 0700 09/20 0701  09/21 0700    I V  (mL/kg)  301 7 (5 7)    IV Piggyback  150    Total Intake(mL/kg)  451 7 (8 5)    Urine (mL/kg/hr)  0 (0)    Drains  72    Stool  0    Total Output  72    Net  +379 7          Unmeasured Urine Occurrence  1 x    Unmeasured Stool Occurrence  1 x          Lab, Imaging and other studies: I have personally reviewed pertinent reports    , CBC with diff:   Lab Results   Component Value Date    WBC 15 52 (H) 09/20/2022    HGB 10 1 (L) 09/20/2022    HCT 32 2 (L) 09/20/2022    MCV 97 09/20/2022     (H) 09/20/2022    MCH 30 4 09/20/2022    MCHC 31 4 09/20/2022    RDW 13 1 09/20/2022    MPV 9 3 09/20/2022   , BMP/CMP:   Lab Results   Component Value Date    SODIUM 136 09/20/2022    K 4 2 09/20/2022     09/20/2022    CO2 28 09/20/2022    BUN 11 09/20/2022    CREATININE 0 62 09/20/2022    CALCIUM 8 5 09/20/2022    EGFR 110 09/20/2022         Trudy Ariza MD  9/20/2022 8:16 PM
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
## 2022-09-22 VITALS
RESPIRATION RATE: 18 BRPM | TEMPERATURE: 97.4 F | HEART RATE: 76 BPM | WEIGHT: 117 LBS | OXYGEN SATURATION: 97 % | BODY MASS INDEX: 19.97 KG/M2 | SYSTOLIC BLOOD PRESSURE: 101 MMHG | HEIGHT: 64 IN | DIASTOLIC BLOOD PRESSURE: 64 MMHG

## 2022-09-22 LAB
ERYTHROCYTE [DISTWIDTH] IN BLOOD BY AUTOMATED COUNT: 13 % (ref 11.6–15.1)
GLUCOSE SERPL-MCNC: 106 MG/DL (ref 65–140)
GLUCOSE SERPL-MCNC: 129 MG/DL (ref 65–140)
HCT VFR BLD AUTO: 32.9 % (ref 34.8–46.1)
HGB BLD-MCNC: 10.7 G/DL (ref 11.5–15.4)
MCH RBC QN AUTO: 31 PG (ref 26.8–34.3)
MCHC RBC AUTO-ENTMCNC: 32.5 G/DL (ref 31.4–37.4)
MCV RBC AUTO: 95 FL (ref 82–98)
PLATELET # BLD AUTO: 714 THOUSANDS/UL (ref 149–390)
PMV BLD AUTO: 9 FL (ref 8.9–12.7)
RBC # BLD AUTO: 3.45 MILLION/UL (ref 3.81–5.12)
WBC # BLD AUTO: 10.3 THOUSAND/UL (ref 4.31–10.16)

## 2022-09-22 PROCEDURE — 99238 HOSP IP/OBS DSCHRG MGMT 30/<: CPT | Performed by: PHYSICIAN ASSISTANT

## 2022-09-22 PROCEDURE — 85027 COMPLETE CBC AUTOMATED: CPT | Performed by: PHYSICIAN ASSISTANT

## 2022-09-22 PROCEDURE — NC001 PR NO CHARGE: Performed by: PHYSICIAN ASSISTANT

## 2022-09-22 PROCEDURE — 82948 REAGENT STRIP/BLOOD GLUCOSE: CPT

## 2022-09-22 RX ORDER — OXYCODONE HYDROCHLORIDE 5 MG/1
2.5-5 TABLET ORAL EVERY 4 HOURS PRN
Qty: 20 TABLET | Refills: 0 | Status: SHIPPED | OUTPATIENT
Start: 2022-09-22 | End: 2022-09-25

## 2022-09-22 RX ORDER — AMOXICILLIN AND CLAVULANATE POTASSIUM 875; 125 MG/1; MG/1
1 TABLET, FILM COATED ORAL 2 TIMES DAILY
Qty: 20 TABLET | Refills: 0 | Status: SHIPPED | OUTPATIENT
Start: 2022-09-22 | End: 2022-10-04

## 2022-09-22 RX ADMIN — ACETAMINOPHEN 975 MG: 325 TABLET, FILM COATED ORAL at 11:13

## 2022-09-22 RX ADMIN — GABAPENTIN 400 MG: 400 CAPSULE ORAL at 11:13

## 2022-09-22 RX ADMIN — CEFEPIME 2000 MG: 2 INJECTION, POWDER, FOR SOLUTION INTRAVENOUS at 05:24

## 2022-09-22 RX ADMIN — GABAPENTIN 400 MG: 400 CAPSULE ORAL at 09:14

## 2022-09-22 RX ADMIN — METHOCARBAMOL TABLETS 1000 MG: 500 TABLET, COATED ORAL at 05:25

## 2022-09-22 RX ADMIN — METHOCARBAMOL TABLETS 1000 MG: 500 TABLET, COATED ORAL at 11:13

## 2022-09-22 RX ADMIN — ACETAMINOPHEN 975 MG: 325 TABLET, FILM COATED ORAL at 05:25

## 2022-09-22 RX ADMIN — METRONIDAZOLE 500 MG: 500 TABLET ORAL at 05:25

## 2022-09-22 RX ADMIN — DOCUSATE SODIUM 100 MG: 100 CAPSULE, LIQUID FILLED ORAL at 09:14
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
## 2022-09-22 NOTE — PLAN OF CARE
Problem: PAIN - ADULT  Goal: Verbalizes/displays adequate comfort level or baseline comfort level  Description: Interventions:  - Encourage patient to monitor pain and request assistance  - Assess pain using appropriate pain scale  - Administer analgesics based on type and severity of pain and evaluate response  - Implement non-pharmacological measures as appropriate and evaluate response  - Consider cultural and social influences on pain and pain management  - Notify physician/advanced practitioner if interventions unsuccessful or patient reports new pain  Outcome: Adequate for Discharge     Problem: INFECTION - ADULT  Goal: Absence or prevention of progression during hospitalization  Description: INTERVENTIONS:  - Assess and monitor for signs and symptoms of infection  - Monitor lab/diagnostic results  - Monitor all insertion sites, i e  indwelling lines, tubes, and drains  - Monitor endotracheal if appropriate and nasal secretions for changes in amount and color  - [place] appropriate cooling/warming therapies per order  - Administer medications as ordered  - Instruct and encourage patient and family to use good hand hygiene technique  - Identify and instruct in appropriate isolation precautions for identified infection/condition  Outcome: Adequate for Discharge  Goal: Absence of fever/infection during neutropenic period  Description: INTERVENTIONS:  - Monitor WBC    Outcome: Adequate for Discharge     Problem: SAFETY ADULT  Goal: Patient will remain free of falls  Description: INTERVENTIONS:  - Educate patient/family on patient safety including physical limitations  - Instruct patient to call for assistance with activity   - Consult OT/PT to assist with strengthening/mobility   - Keep Call bell within reach  - Keep bed low and locked with side rails adjusted as appropriate  - Keep care items and personal belongings within reach  - Initiate and maintain comfort rounds  - Make Fall Risk Sign visible to staff  - Offer Toileting every  Hours, in advance of need  - Initiate/Maintain alarm  - Obtain necessary fall risk management equipment:   - Apply yellow socks and bracelet for high fall risk patients  - Consider moving patient to room near nurses station  Outcome: Adequate for Discharge  Goal: Maintain or return to baseline ADL function  Description: INTERVENTIONS:  -  Assess patient's ability to carry out ADLs; assess patient's baseline for ADL function and identify physical deficits which impact ability to perform ADLs (bathing, care of mouth/teeth, toileting, grooming, dressing, etc )  - Assess/evaluate cause of self-care deficits   - Assess range of motion  - Assess patient's mobility; develop plan if impaired  - Assess patient's need for assistive devices and provide as appropriate  - Encourage maximum independence but intervene and supervise when necessary  - Involve family in performance of ADLs  - Assess for home care needs following discharge   - Consider OT consult to assist with ADL evaluation and planning for discharge  - Provide patient education as appropriate  Outcome: Adequate for Discharge  Goal: Maintains/Returns to pre admission functional level  Description: INTERVENTIONS:  - Perform BMAT or MOVE assessment daily    - Set and communicate daily mobility goal to care team and patient/family/caregiver  - Collaborate with rehabilitation services on mobility goals if consulted  - Perform Range of Motion  times a day  - Reposition patient every  hours    - Dangle patient  times a day  - Stand patient  times a day  - Ambulate patient  times a day  - Out of bed to chair  times a day   - Out of bed for meals  times a day  - Out of bed for toileting  - Record patient progress and toleration of activity level   Outcome: Adequate for Discharge     Problem: DISCHARGE PLANNING  Goal: Discharge to home or other facility with appropriate resources  Description: INTERVENTIONS:  - Identify barriers to discharge w/patient and caregiver  - Arrange for needed discharge resources and transportation as appropriate  - Identify discharge learning needs (meds, wound care, etc )  - Arrange for interpretive services to assist at discharge as needed  - Refer to Case Management Department for coordinating discharge planning if the patient needs post-hospital services based on physician/advanced practitioner order or complex needs related to functional status, cognitive ability, or social support system  Outcome: Adequate for Discharge     Problem: Knowledge Deficit  Goal: Patient/family/caregiver demonstrates understanding of disease process, treatment plan, medications, and discharge instructions  Description: Complete learning assessment and assess knowledge base    Interventions:  - Provide teaching at level of understanding  - Provide teaching via preferred learning methods  Outcome: Adequate for Discharge     Problem: GASTROINTESTINAL - ADULT  Goal: Minimal or absence of nausea and/or vomiting  Description: INTERVENTIONS:  - Administer IV fluids if ordered to ensure adequate hydration  - Maintain NPO status until nausea and vomiting are resolved  - Nasogastric tube if ordered  - Administer ordered antiemetic medications as needed  - Provide nonpharmacologic comfort measures as appropriate  - Advance diet as tolerated, if ordered  - Consider nutrition services referral to assist patient with adequate nutrition and appropriate food choices  Outcome: Adequate for Discharge  Goal: Maintains or returns to baseline bowel function  Description: INTERVENTIONS:  - Assess bowel function  - Encourage oral fluids to ensure adequate hydration  - Administer IV fluids if ordered to ensure adequate hydration  - Administer ordered medications as needed  - Encourage mobilization and activity  - Consider nutritional services referral to assist patient with adequate nutrition and appropriate food choices  Outcome: Adequate for Discharge  Goal: Maintains adequate nutritional intake  Description: INTERVENTIONS:  - Monitor percentage of each meal consumed  - Identify factors contributing to decreased intake, treat as appropriate  - Assist with meals as needed  - Monitor I&O, weight, and lab values if indicated  - Obtain nutrition services referral as needed  Outcome: Adequate for Discharge  Goal: Establish and maintain optimal ostomy function  Description: INTERVENTIONS:  - Assess bowel function  - Encourage oral fluids to ensure adequate hydration  - Administer IV fluids if ordered to ensure adequate hydration   - Administer ordered medications as needed  - Encourage mobilization and activity  - Nutrition services referral to assist patient with appropriate food choices  - Assess stoma site  - Consider wound care consult   Outcome: Adequate for Discharge  Goal: Oral mucous membranes remain intact  Description: INTERVENTIONS  - Assess oral mucosa and hygiene practices  - Implement preventative oral hygiene regimen  - Implement oral medicated treatments as ordered  - Initiate Nutrition services referral as needed  Outcome: Adequate for Discharge     Problem: Potential for Falls  Goal: Patient will remain free of falls  Description: INTERVENTIONS:  - Educate patient/family on patient safety including physical limitations  - Instruct patient to call for assistance with activity   - Consult OT/PT to assist with strengthening/mobility   - Keep Call bell within reach  - Keep bed low and locked with side rails adjusted as appropriate  - Keep care items and personal belongings within reach  - Initiate and maintain comfort rounds  - Make Fall Risk Sign visible to staff  - Offer Toileting every  Hours, in advance of need  - Initiate/Maintain alarm  - Obtain necessary fall risk management equipment:   - Apply yellow socks and bracelet for high fall risk patients  - Consider moving patient to room near nurses station  Outcome: Adequate for Discharge     Problem: SKIN/TISSUE INTEGRITY - ADULT  Goal: Skin Integrity remains intact(Skin Breakdown Prevention)  Description: Assess:  -Perform Merrill assessment every   -Clean and moisturize skin every   -Inspect skin when repositioning, toileting, and assisting with ADLS  -Assess under medical devices such as  every   -Assess extremities for adequate circulation and sensation     Bed Management:  -Have minimal linens on bed & keep smooth, unwrinkled  -Change linens as needed when moist or perspiring  -Avoid sitting or lying in one position for more than  hours while in bed  -Keep HOB at degrees     Toileting:  -Offer bedside commode  -Assess for incontinence every   -Use incontinent care products after each incontinent episode such as     Activity:  -Mobilize patient  times a day  -Encourage activity and walks on unit  -Encourage or provide ROM exercises   -Turn and reposition patient every  Hours  -Use appropriate equipment to lift or move patient in bed  -Instruct/ Assist with weight shifting every  when out of bed in chair  -Consider limitation of chair time  hour intervals    Skin Care:  -Avoid use of baby powder, tape, friction and shearing, hot water or constrictive clothing  -Relieve pressure over bony prominences using   -Do not massage red bony areas    Next Steps:  -Teach patient strategies to minimize risks such as    -Consider consults to  interdisciplinary teams such as   Outcome: Adequate for Discharge  Goal: Incision(s), wounds(s) or drain site(s) healing without S/S of infection  Description: INTERVENTIONS  - Assess and document dressing, incision, wound bed, drain sites and surrounding tissue  - Provide patient and family education  - Perform skin care/dressing changes every   Outcome: Adequate for Discharge  Goal: Pressure injury heals and does not worsen  Description: Interventions:  - Implement low air loss mattress or specialty surface (Criteria met)  - Apply silicone foam dressing  - Instruct/assist with weight shifting every  minutes when in chair   - Limit chair time to  hour intervals  - Use special pressure reducing interventions such as  when in chair   - Apply fecal or urinary incontinence containment device   - Perform passive or active ROM every   - Turn and reposition patient & offload bony prominences every  hours   - Utilize friction reducing device or surface for transfers   - Consider consults to  interdisciplinary teams such as   - Use incontinent care products after each incontinent episode such as  - Consider nutrition services referral as needed  Outcome: Adequate for Discharge     Problem: Nutrition/Hydration-ADULT  Goal: Nutrient/Hydration intake appropriate for improving, restoring or maintaining nutritional needs  Description: Monitor and assess patient's nutrition/hydration status for malnutrition  Collaborate with interdisciplinary team and initiate plan and interventions as ordered  Monitor patient's weight and dietary intake as ordered or per policy  Utilize nutrition screening tool and intervene as necessary  Determine patient's food preferences and provide high-protein, high-caloric foods as appropriate       INTERVENTIONS:  - Monitor oral intake, urinary output, labs, and treatment plans  - Assess nutrition and hydration status and recommend course of action  - Evaluate amount of meals eaten  - Assist patient with eating if necessary   - Allow adequate time for meals  - Recommend/ encourage appropriate diets, oral nutritional supplements, and vitamin/mineral supplements  - Order, calculate, and assess calorie counts as needed  - Recommend, monitor, and adjust tube feedings and TPN/PPN based on assessed needs  - Assess need for intravenous fluids  - Provide specific nutrition/hydration education as appropriate  - Include patient/family/caregiver in decisions related to nutrition  Outcome: Adequate for Discharge
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
## 2022-09-23 ENCOUNTER — HOME CARE VISIT (OUTPATIENT)
Dept: HOME HEALTH SERVICES | Facility: HOME HEALTHCARE | Age: 42
End: 2022-09-23
Payer: COMMERCIAL

## 2022-09-23 ENCOUNTER — TRANSITIONAL CARE MANAGEMENT (OUTPATIENT)
Dept: FAMILY MEDICINE CLINIC | Facility: CLINIC | Age: 42
End: 2022-09-23

## 2022-09-23 VITALS
SYSTOLIC BLOOD PRESSURE: 96 MMHG | OXYGEN SATURATION: 98 % | TEMPERATURE: 98 F | RESPIRATION RATE: 16 BRPM | DIASTOLIC BLOOD PRESSURE: 58 MMHG | HEART RATE: 57 BPM

## 2022-09-23 PROCEDURE — 400013 VN SOC

## 2022-09-23 PROCEDURE — G0299 HHS/HOSPICE OF RN EA 15 MIN: HCPCS

## 2022-09-23 NOTE — CASE COMMUNICATION
[facility] patient to [address] [place] [address] service with the following disciplines:      SN  This report is informational only, no responses is needed  Primary focus of home health care wound care with packing and drain care  Patient stated goals of care to get stronger and resume past physical activities  Anticipated visit pattern and next visit date daily for 2 weeks then 3x/week for 2 weeks  See medication list - Meds  verified  Significant clinical findings- patient has support needed by family and friends  Potential barriers to goal achievement- wound complications  Other pertinent information  Thank you for allowing us to participate in the care of your patient        Farheen Gama RN, [facility]

## 2022-09-24 ENCOUNTER — HOSPITAL ENCOUNTER (OUTPATIENT)
Dept: CT IMAGING | Facility: HOSPITAL | Age: 42
Discharge: HOME | End: 2022-09-24
Payer: COMMERCIAL

## 2022-09-24 ENCOUNTER — HOME CARE VISIT (OUTPATIENT)
Dept: HOME HEALTH SERVICES | Facility: HOME HEALTHCARE | Age: 42
End: 2022-09-24
Payer: COMMERCIAL

## 2022-09-24 VITALS
DIASTOLIC BLOOD PRESSURE: 70 MMHG | TEMPERATURE: 96.6 F | RESPIRATION RATE: 16 BRPM | HEART RATE: 72 BPM | OXYGEN SATURATION: 100 % | SYSTOLIC BLOOD PRESSURE: 112 MMHG

## 2022-09-24 DIAGNOSIS — R18.8 INTRAABDOMINAL FLUID COLLECTION: ICD-10-CM

## 2022-09-24 PROCEDURE — 74177 CT ABD & PELVIS W/CONTRAST: CPT

## 2022-09-24 PROCEDURE — G1004 CDSM NDSC: HCPCS

## 2022-09-24 PROCEDURE — G0299 HHS/HOSPICE OF RN EA 15 MIN: HCPCS

## 2022-09-24 RX ADMIN — IOHEXOL 85 ML: 350 INJECTION, SOLUTION INTRAVENOUS at 11:39

## 2022-09-25 ENCOUNTER — HOME CARE VISIT (OUTPATIENT)
Dept: HOME HEALTH SERVICES | Facility: HOME HEALTHCARE | Age: 42
End: 2022-09-25
Payer: COMMERCIAL

## 2022-09-25 VITALS
SYSTOLIC BLOOD PRESSURE: 102 MMHG | DIASTOLIC BLOOD PRESSURE: 62 MMHG | OXYGEN SATURATION: 100 % | RESPIRATION RATE: 16 BRPM | TEMPERATURE: 96.6 F | HEART RATE: 73 BPM

## 2022-09-25 PROCEDURE — G0299 HHS/HOSPICE OF RN EA 15 MIN: HCPCS

## 2022-09-26 ENCOUNTER — OFFICE VISIT (OUTPATIENT)
Dept: SURGERY | Facility: CLINIC | Age: 42
End: 2022-09-26

## 2022-09-26 VITALS — DIASTOLIC BLOOD PRESSURE: 56 MMHG | SYSTOLIC BLOOD PRESSURE: 92 MMHG | HEART RATE: 101 BPM | TEMPERATURE: 98.4 F

## 2022-09-26 DIAGNOSIS — S36.209A: Primary | ICD-10-CM

## 2022-09-26 PROCEDURE — 99024 POSTOP FOLLOW-UP VISIT: CPT | Performed by: SURGERY

## 2022-09-26 NOTE — PROGRESS NOTES
Office Visit - General Surgery  Nile Cannon MRN: [id number]  Encounter: [id number]    Assessment and Plan  Problem List Items Addressed This Visit    None         Chief Complaint:  Nile Cannon is a 35 y o  female who presents for Post-op (2nd p/o)    Subjective      Past Medical History:   Diagnosis Date    Seizures [facility])        Past Surgical History:   Procedure Laterality Date    ANTERIOR CRUCIATE LIGAMENT REPAIR      CLOSED REDUCTION MANDIBLE Bilateral 6/26/2016    Procedure: CLOSED REDUCTION MANDIBLE;  Surgeon: Mannie Wright DMD;  [facility]: [facility];  Service:     COLPOSCOPY W/ BIOPSY / [place] Foil  03/30/2015    COLP neg results /    Vernestine Kira, DIAGNOSTIC / THERAPEUTIC  06/2010    possible polyp    DISTAL PANCREATECTOMY  9/11/2022    Procedure: PANCREATECTOMY DISTAL;  Surgeon: Siri Hough DO;  [facility];  Service: General    [facility]    KNEE ARTHROSCOPY  2007    ACL repair    NOSE SURGERY  1996    revision    IA LAP,DIAGNOSTIC ABDOMEN N/A 9/11/2022    Procedure: LAPAROSCOPY DIAGNOSTIC, CONVERTED TO OPEN;  Surgeon: Siri Hough DO;  [facility];  Service: General    SPLENECTOMY, TOTAL N/A 9/11/2022    Procedure: SPLENECTOMY;  Surgeon: Siri Hough DO;  [facility];  Service: General       Family History   Problem Relation Age of Onset    No Known Problems Mother     Other Father         amyotrophic lateral sclerosis    Osteoporosis Maternal Grandmother     Tuberculosis Maternal Grandmother     Stroke Maternal Grandfather         stroke syndrome    Alzheimer's disease Paternal Grandmother     Alzheimer's disease Paternal Grandfather     Thyroid cancer Sister 40    No Known Problems Maternal Aunt     No Known Problems Paternal Aunt     Substance Abuse Neg Hx     Mental illness Neg Hx     BRCA2 Positive Neg Hx     BRCA2 Negative Neg Hx     BRCA 1/2 Neg Hx     BRCA1 Positive Neg Hx     BRCA1 Negative Neg Hx     Ovarian cancer Neg Hx     Endometrial cancer Neg Hx     Breast cancer additional onset Neg Hx     Colon cancer Neg Hx     Breast cancer Neg Hx        Social History     Tobacco Use    Smoking status: Never Smoker    Smokeless tobacco: Never Used   Vaping Use    Vaping Use: Never used   Substance Use Topics    Alcohol use: No     Comment: former consumption excessive drinking    Drug use: Not Currently        Medications  Current Outpatient Medications on File Prior to Visit   Medication Sig Dispense Refill    acetaminophen (TYLENOL) 325 mg tablet Take 2 tablets (650 mg total) by mouth every 6 (six) hours for 14 days 112 tablet 0    Alcohol Swabs 70 % PADS May substitute brand based on insurance coverage  Check glucose BID  100 each 0    amoxicillin-clavulanate (AUGMENTIN) 875-125 mg per tablet Take 1 tablet by mouth 2 (two) times a day for 10 days 20 tablet 0    Blood Glucose Monitoring Suppl (OneTouch Verio Reflect) w/Device KIT May substitute brand based on insurance coverage  Check glucose BID  1 kit 0    docusate sodium (COLACE) 100 mg capsule Take 1 capsule (100 mg total) by mouth 2 (two) times a day for 14 days 28 capsule 0    gabapentin (NEURONTIN) 400 mg capsule Take 1 capsule (400 mg total) by mouth 4 (four) times a day for 14 days 56 capsule 0    glucose blood (OneTouch Verio) test strip May substitute brand based on insurance coverage  Check glucose BID  100 each 0    methocarbamol (ROBAXIN) 500 mg tablet Take 2 tablets (1,000 mg total) by mouth every 6 (six) hours for 14 days 112 tablet 0    OneTouch Delica Lancets 94G MISC May substitute brand based on insurance coverage   Check glucose BID  100 each 0    [] oxyCODONE (ROXICODONE) 5 immediate release tablet Take 0 5-1 tablets (2 5-5 mg total) by mouth every 4 (four) hours as needed for severe pain or moderate pain for up to 3 days Max Daily Amount: 30 mg 20 tablet 0     No current facility-administered medications on file prior to visit         Allergies  Allergies   Allergen Reactions    Sulfa Antibiotics Hives       Review of Systems    Objective  Vitals:    09/26/22 0826   BP: 92/56   Pulse: 101   Temp: 98 4 °F (36 9 °C)       Physical Exam
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
## 2022-09-27 ENCOUNTER — HOSPITAL ENCOUNTER (INPATIENT)
Facility: HOSPITAL | Age: 42
LOS: 6 days | Discharge: HOME HEALTH | DRG: 405 | End: 2022-10-04
Attending: EMERGENCY MEDICINE | Admitting: INTERNAL MEDICINE
Payer: COMMERCIAL

## 2022-09-27 ENCOUNTER — HOME CARE VISIT (OUTPATIENT)
Dept: HOME HEALTH SERVICES | Facility: HOME HEALTHCARE | Age: 42
End: 2022-09-27
Payer: COMMERCIAL

## 2022-09-27 ENCOUNTER — OFFICE VISIT (OUTPATIENT)
Dept: FAMILY MEDICINE CLINIC | Facility: CLINIC | Age: 42
End: 2022-09-27
Payer: COMMERCIAL

## 2022-09-27 VITALS
SYSTOLIC BLOOD PRESSURE: 100 MMHG | RESPIRATION RATE: 18 BRPM | TEMPERATURE: 100.2 F | DIASTOLIC BLOOD PRESSURE: 56 MMHG | OXYGEN SATURATION: 99 % | HEART RATE: 102 BPM

## 2022-09-27 VITALS
RESPIRATION RATE: 16 BRPM | BODY MASS INDEX: 19.87 KG/M2 | WEIGHT: 116.4 LBS | HEIGHT: 64 IN | DIASTOLIC BLOOD PRESSURE: 60 MMHG | HEART RATE: 97 BPM | SYSTOLIC BLOOD PRESSURE: 112 MMHG

## 2022-09-27 DIAGNOSIS — D64.9 LOW HEMOGLOBIN: ICD-10-CM
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
## 2022-09-27 DIAGNOSIS — R18.8 INTRA-ABDOMINAL FLUID COLLECTION: ICD-10-CM

## 2022-09-27 DIAGNOSIS — R19.7 DIARRHEA: ICD-10-CM

## 2022-09-27 DIAGNOSIS — R50.9 FEVER, UNSPECIFIED FEVER CAUSE: ICD-10-CM

## 2022-09-27 DIAGNOSIS — R10.9 ABDOMINAL PAIN: Primary | ICD-10-CM

## 2022-09-27 DIAGNOSIS — R19.7 DIARRHEA, UNSPECIFIED TYPE: ICD-10-CM

## 2022-09-27 DIAGNOSIS — T36.95XA ANTIBIOTIC CAUSING ADVERSE EFFECT: ICD-10-CM

## 2022-09-27 DIAGNOSIS — S36.209D: Primary | ICD-10-CM
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
## 2022-09-27 LAB
ALBUMIN SERPL BCP-MCNC: 3.2 G/DL (ref 3.5–5)
ALP SERPL-CCNC: 115 U/L (ref 46–116)
ALT SERPL W P-5'-P-CCNC: 34 U/L (ref 12–78)
ANION GAP SERPL CALCULATED.3IONS-SCNC: 5 MMOL/L (ref 4–13)
APTT PPP: 32 SECONDS (ref 23–37)
AST SERPL W P-5'-P-CCNC: 13 U/L (ref 5–45)
BACTERIA UR QL AUTO: ABNORMAL /HPF
BASOPHILS # BLD AUTO: 0.1 THOUSANDS/ÂΜL (ref 0–0.1)
BASOPHILS NFR BLD AUTO: 0 % (ref 0–1)
BILIRUB SERPL-MCNC: 0.55 MG/DL (ref 0.2–1)
BILIRUB UR QL STRIP: NEGATIVE
BUN SERPL-MCNC: 9 MG/DL (ref 5–25)
CALCIUM ALBUM COR SERPL-MCNC: 9.5 MG/DL (ref 8.3–10.1)
CALCIUM SERPL-MCNC: 8.9 MG/DL (ref 8.3–10.1)
CHLORIDE SERPL-SCNC: 98 MMOL/L (ref 96–108)
CLARITY UR: CLEAR
CO2 SERPL-SCNC: 28 MMOL/L (ref 21–32)
COLOR UR: YELLOW
CREAT SERPL-MCNC: 0.71 MG/DL (ref 0.6–1.3)
EOSINOPHIL # BLD AUTO: 0.12 THOUSAND/ÂΜL (ref 0–0.61)
EOSINOPHIL NFR BLD AUTO: 0 % (ref 0–6)
ERYTHROCYTE [DISTWIDTH] IN BLOOD BY AUTOMATED COUNT: 13.2 % (ref 11.6–15.1)
EXT PREG TEST URINE: NEGATIVE
EXT. CONTROL ED NAV: NORMAL
FLUAV RNA RESP QL NAA+PROBE: NEGATIVE
FLUBV RNA RESP QL NAA+PROBE: NEGATIVE
GFR SERPL CREATININE-BSD FRML MDRD: 104 ML/MIN/1.73SQ M
GLUCOSE SERPL-MCNC: 141 MG/DL (ref 65–140)
GLUCOSE UR STRIP-MCNC: NEGATIVE MG/DL
HCT VFR BLD AUTO: 31.9 % (ref 34.8–46.1)
HGB BLD-MCNC: 10.5 G/DL (ref 11.5–15.4)
HGB UR QL STRIP.AUTO: NEGATIVE
IMM GRANULOCYTES # BLD AUTO: 0.18 THOUSAND/UL (ref 0–0.2)
IMM GRANULOCYTES NFR BLD AUTO: 1 % (ref 0–2)
INR PPP: 1.11 (ref 0.84–1.19)
KETONES UR STRIP-MCNC: ABNORMAL MG/DL
LACTATE SERPL-SCNC: 1.4 MMOL/L (ref 0.5–2)
LEUKOCYTE ESTERASE UR QL STRIP: NEGATIVE
LIPASE SERPL-CCNC: 376 U/L (ref 73–393)
LYMPHOCYTES # BLD AUTO: 1.66 THOUSANDS/ÂΜL (ref 0.6–4.47)
LYMPHOCYTES NFR BLD AUTO: 6 % (ref 14–44)
MCH RBC QN AUTO: 30.7 PG (ref 26.8–34.3)
MCHC RBC AUTO-ENTMCNC: 32.9 G/DL (ref 31.4–37.4)
MCV RBC AUTO: 93 FL (ref 82–98)
MONOCYTES # BLD AUTO: 3.85 THOUSAND/ÂΜL (ref 0.17–1.22)
MONOCYTES NFR BLD AUTO: 14 % (ref 4–12)
MUCOUS THREADS UR QL AUTO: ABNORMAL
NEUTROPHILS # BLD AUTO: 21.19 THOUSANDS/ÂΜL (ref 1.85–7.62)
NEUTS SEG NFR BLD AUTO: 79 % (ref 43–75)
NITRITE UR QL STRIP: NEGATIVE
NON-SQ EPI CELLS URNS QL MICRO: ABNORMAL /HPF
NRBC BLD AUTO-RTO: 0 /100 WBCS
PH UR STRIP.AUTO: 5.5 [PH]
PLATELET # BLD AUTO: 723 THOUSANDS/UL (ref 149–390)
PMV BLD AUTO: 8.7 FL (ref 8.9–12.7)
POTASSIUM SERPL-SCNC: 3.9 MMOL/L (ref 3.5–5.3)
PROT SERPL-MCNC: 7.2 G/DL (ref 6.4–8.4)
PROT UR STRIP-MCNC: ABNORMAL MG/DL
PROTHROMBIN TIME: 14.5 SECONDS (ref 11.6–14.5)
RBC # BLD AUTO: 3.42 MILLION/UL (ref 3.81–5.12)
RBC #/AREA URNS AUTO: ABNORMAL /HPF
RSV RNA RESP QL NAA+PROBE: NEGATIVE
SARS-COV-2 RNA RESP QL NAA+PROBE: NEGATIVE
SODIUM SERPL-SCNC: 131 MMOL/L (ref 135–147)
SP GR UR STRIP.AUTO: 1.02 (ref 1–1.03)
UROBILINOGEN UR STRIP-ACNC: <2 MG/DL
WBC # BLD AUTO: 27.1 THOUSAND/UL (ref 4.31–10.16)
WBC #/AREA URNS AUTO: ABNORMAL /HPF

## 2022-09-27 PROCEDURE — 85025 COMPLETE CBC W/AUTO DIFF WBC: CPT | Performed by: EMERGENCY MEDICINE

## 2022-09-27 PROCEDURE — 36415 COLL VENOUS BLD VENIPUNCTURE: CPT

## 2022-09-27 PROCEDURE — 87493 C DIFF AMPLIFIED PROBE: CPT

## 2022-09-27 PROCEDURE — 81025 URINE PREGNANCY TEST: CPT

## 2022-09-27 PROCEDURE — 85610 PROTHROMBIN TIME: CPT

## 2022-09-27 PROCEDURE — 96366 THER/PROPH/DIAG IV INF ADDON: CPT

## 2022-09-27 PROCEDURE — 99285 EMERGENCY DEPT VISIT HI MDM: CPT | Performed by: EMERGENCY MEDICINE

## 2022-09-27 PROCEDURE — 1111F DSCHRG MED/CURRENT MED MERGE: CPT | Performed by: NURSE PRACTITIONER

## 2022-09-27 PROCEDURE — 0241U HB NFCT DS VIR RESP RNA 4 TRGT: CPT

## 2022-09-27 PROCEDURE — 84145 PROCALCITONIN (PCT): CPT

## 2022-09-27 PROCEDURE — 96376 TX/PRO/DX INJ SAME DRUG ADON: CPT

## 2022-09-27 PROCEDURE — 81001 URINALYSIS AUTO W/SCOPE: CPT

## 2022-09-27 PROCEDURE — G0299 HHS/HOSPICE OF RN EA 15 MIN: HCPCS

## 2022-09-27 PROCEDURE — 80053 COMPREHEN METABOLIC PANEL: CPT | Performed by: EMERGENCY MEDICINE

## 2022-09-27 PROCEDURE — 83690 ASSAY OF LIPASE: CPT | Performed by: EMERGENCY MEDICINE

## 2022-09-27 PROCEDURE — 99496 TRANSJ CARE MGMT HIGH F2F 7D: CPT | Performed by: NURSE PRACTITIONER

## 2022-09-27 PROCEDURE — 83605 ASSAY OF LACTIC ACID: CPT

## 2022-09-27 PROCEDURE — 96375 TX/PRO/DX INJ NEW DRUG ADDON: CPT

## 2022-09-27 PROCEDURE — 96367 TX/PROPH/DG ADDL SEQ IV INF: CPT

## 2022-09-27 PROCEDURE — 87040 BLOOD CULTURE FOR BACTERIA: CPT

## 2022-09-27 PROCEDURE — 96365 THER/PROPH/DIAG IV INF INIT: CPT

## 2022-09-27 PROCEDURE — 85730 THROMBOPLASTIN TIME PARTIAL: CPT

## 2022-09-27 PROCEDURE — 93005 ELECTROCARDIOGRAM TRACING: CPT

## 2022-09-27 PROCEDURE — 99285 EMERGENCY DEPT VISIT HI MDM: CPT

## 2022-09-27 RX ORDER — HYDROMORPHONE HCL/PF 1 MG/ML
0.5 SYRINGE (ML) INJECTION ONCE
Status: COMPLETED | OUTPATIENT
Start: 2022-09-27 | End: 2022-09-27

## 2022-09-27 RX ORDER — SODIUM CHLORIDE, SODIUM GLUCONATE, SODIUM ACETATE, POTASSIUM CHLORIDE, MAGNESIUM CHLORIDE, SODIUM PHOSPHATE, DIBASIC, AND POTASSIUM PHOSPHATE .53; .5; .37; .037; .03; .012; .00082 G/100ML; G/100ML; G/100ML; G/100ML; G/100ML; G/100ML; G/100ML
1000 INJECTION, SOLUTION INTRAVENOUS ONCE
Status: COMPLETED | OUTPATIENT
Start: 2022-09-27 | End: 2022-09-28

## 2022-09-27 RX ORDER — VANCOMYCIN HYDROCHLORIDE 1 G/200ML
20 INJECTION, SOLUTION INTRAVENOUS EVERY 12 HOURS
Status: DISCONTINUED | OUTPATIENT
Start: 2022-09-28 | End: 2022-09-28

## 2022-09-27 RX ORDER — SACCHAROMYCES BOULARDII 250 MG
250 CAPSULE ORAL 2 TIMES DAILY
Qty: 14 CAPSULE | Refills: 0 | Status: SHIPPED | OUTPATIENT
Start: 2022-09-27 | End: 2022-09-27

## 2022-09-27 RX ORDER — SACCHAROMYCES BOULARDII 250 MG
250 CAPSULE ORAL 2 TIMES DAILY
Qty: 14 CAPSULE | Refills: 0 | Status: SHIPPED | OUTPATIENT
Start: 2022-09-27 | End: 2022-10-27

## 2022-09-27 RX ORDER — VANCOMYCIN HYDROCHLORIDE 1 G/200ML
20 INJECTION, SOLUTION INTRAVENOUS ONCE
Status: DISCONTINUED | OUTPATIENT
Start: 2022-09-27 | End: 2022-09-28

## 2022-09-27 RX ORDER — ONDANSETRON 2 MG/ML
4 INJECTION INTRAMUSCULAR; INTRAVENOUS ONCE
Status: DISCONTINUED | OUTPATIENT
Start: 2022-09-27 | End: 2022-10-04 | Stop reason: HOSPADM

## 2022-09-27 RX ADMIN — VANCOMYCIN HYDROCHLORIDE 1000 MG: 1 INJECTION, SOLUTION INTRAVENOUS at 23:05

## 2022-09-27 RX ADMIN — HYDROMORPHONE HYDROCHLORIDE 0.5 MG: 1 INJECTION, SOLUTION INTRAMUSCULAR; INTRAVENOUS; SUBCUTANEOUS at 22:52

## 2022-09-27 RX ADMIN — SODIUM CHLORIDE, SODIUM GLUCONATE, SODIUM ACETATE, POTASSIUM CHLORIDE AND MAGNESIUM CHLORIDE 1000 ML: 526; 502; 368; 37; 30 INJECTION, SOLUTION INTRAVENOUS at 22:38

## 2022-09-27 RX ADMIN — HYDROMORPHONE HYDROCHLORIDE 0.5 MG: 1 INJECTION, SOLUTION INTRAMUSCULAR; INTRAVENOUS; SUBCUTANEOUS at 23:33

## 2022-09-27 RX ADMIN — CEFTRIAXONE SODIUM 2000 MG: 10 INJECTION, POWDER, FOR SOLUTION INTRAVENOUS at 22:39
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
## 2022-09-27 NOTE — CASE COMMUNICATION
Today patient c/o cramping, diarrhea, and fever  Temperature of 100 2 and pulse of 102 at time of SN visit  Other vs's WNL  RN reported to [facility] office, spoke to [facility] [address] [place] [address]

## 2022-09-28 ENCOUNTER — APPOINTMENT (EMERGENCY)
Dept: RADIOLOGY | Facility: HOSPITAL | Age: 42
DRG: 405 | End: 2022-09-28
Payer: COMMERCIAL

## 2022-09-28 ENCOUNTER — HOME CARE VISIT (OUTPATIENT)
Dept: HOME HEALTH SERVICES | Facility: HOME HEALTHCARE | Age: 42
End: 2022-09-28
Payer: COMMERCIAL

## 2022-09-28 PROBLEM — E87.1 HYPONATREMIA: Status: ACTIVE | Noted: 2022-09-28

## 2022-09-28 PROBLEM — A41.9 SEPSIS (HCC): Status: ACTIVE | Noted: 2022-09-28

## 2022-09-28 PROBLEM — S36.209S: Status: ACTIVE | Noted: 2022-09-12

## 2022-09-28 PROBLEM — R19.7 DIARRHEA OF PRESUMED INFECTIOUS ORIGIN: Status: ACTIVE | Noted: 2022-09-28

## 2022-09-28 PROBLEM — R10.84 GENERALIZED ABDOMINAL PAIN: Status: ACTIVE | Noted: 2022-09-28

## 2022-09-28 LAB
ALBUMIN SERPL BCP-MCNC: 2.9 G/DL (ref 3.5–5)
ALP SERPL-CCNC: 109 U/L (ref 46–116)
ALT SERPL W P-5'-P-CCNC: 26 U/L (ref 12–78)
ANION GAP SERPL CALCULATED.3IONS-SCNC: 8 MMOL/L (ref 4–13)
AST SERPL W P-5'-P-CCNC: 9 U/L (ref 5–45)
ATRIAL RATE: 102 BPM
BASOPHILS # BLD MANUAL: 0 THOUSAND/UL (ref 0–0.1)
BASOPHILS NFR MAR MANUAL: 0 % (ref 0–1)
BILIRUB SERPL-MCNC: 0.82 MG/DL (ref 0.2–1)
BUN SERPL-MCNC: 7 MG/DL (ref 5–25)
C DIFF TOX A+B STL QL IA: POSITIVE
C DIFF TOX GENS STL QL NAA+PROBE: POSITIVE
CALCIUM ALBUM COR SERPL-MCNC: 9.3 MG/DL (ref 8.3–10.1)
CALCIUM SERPL-MCNC: 8.4 MG/DL (ref 8.3–10.1)
CHLORIDE SERPL-SCNC: 99 MMOL/L (ref 96–108)
CO2 SERPL-SCNC: 26 MMOL/L (ref 21–32)
CREAT SERPL-MCNC: 0.52 MG/DL (ref 0.6–1.3)
EOSINOPHIL # BLD MANUAL: 0 THOUSAND/UL (ref 0–0.4)
EOSINOPHIL NFR BLD MANUAL: 0 % (ref 0–6)
ERYTHROCYTE [DISTWIDTH] IN BLOOD BY AUTOMATED COUNT: 13.4 % (ref 11.6–15.1)
GFR SERPL CREATININE-BSD FRML MDRD: 117 ML/MIN/1.73SQ M
GLUCOSE SERPL-MCNC: 102 MG/DL (ref 65–140)
HCT VFR BLD AUTO: 31.9 % (ref 34.8–46.1)
HGB BLD-MCNC: 10.4 G/DL (ref 11.5–15.4)
LYMPHOCYTES # BLD AUTO: 1.08 THOUSAND/UL (ref 0.6–4.47)
LYMPHOCYTES # BLD AUTO: 3 % (ref 14–44)
MCH RBC QN AUTO: 30.7 PG (ref 26.8–34.3)
MCHC RBC AUTO-ENTMCNC: 32.6 G/DL (ref 31.4–37.4)
MCV RBC AUTO: 94 FL (ref 82–98)
MONOCYTES # BLD AUTO: 3.6 THOUSAND/UL (ref 0–1.22)
MONOCYTES NFR BLD: 10 % (ref 4–12)
NEUTROPHILS # BLD MANUAL: 31.34 THOUSAND/UL (ref 1.85–7.62)
NEUTS BAND NFR BLD MANUAL: 8 % (ref 0–8)
NEUTS SEG NFR BLD AUTO: 79 % (ref 43–75)
P AXIS: 71 DEGREES
PLATELET # BLD AUTO: 707 THOUSANDS/UL (ref 149–390)
PLATELET BLD QL SMEAR: ABNORMAL
PMV BLD AUTO: 8.9 FL (ref 8.9–12.7)
POLYCHROMASIA BLD QL SMEAR: PRESENT
POTASSIUM SERPL-SCNC: 3.4 MMOL/L (ref 3.5–5.3)
PR INTERVAL: 112 MS
PROCALCITONIN SERPL-MCNC: 0.2 NG/ML
PROT SERPL-MCNC: 6.5 G/DL (ref 6.4–8.4)
QRS AXIS: 66 DEGREES
QRSD INTERVAL: 86 MS
QT INTERVAL: 326 MS
QTC INTERVAL: 424 MS
RBC # BLD AUTO: 3.39 MILLION/UL (ref 3.81–5.12)
SODIUM SERPL-SCNC: 133 MMOL/L (ref 135–147)
T WAVE AXIS: 16 DEGREES
VENTRICULAR RATE: 102 BPM
WBC # BLD AUTO: 36.02 THOUSAND/UL (ref 4.31–10.16)

## 2022-09-28 PROCEDURE — 74177 CT ABD & PELVIS W/CONTRAST: CPT

## 2022-09-28 PROCEDURE — 96376 TX/PRO/DX INJ SAME DRUG ADON: CPT

## 2022-09-28 PROCEDURE — 93010 ELECTROCARDIOGRAM REPORT: CPT | Performed by: INTERNAL MEDICINE

## 2022-09-28 PROCEDURE — 85027 COMPLETE CBC AUTOMATED: CPT | Performed by: PHYSICIAN ASSISTANT

## 2022-09-28 PROCEDURE — 80053 COMPREHEN METABOLIC PANEL: CPT | Performed by: PHYSICIAN ASSISTANT

## 2022-09-28 PROCEDURE — 99223 1ST HOSP IP/OBS HIGH 75: CPT | Performed by: INTERNAL MEDICINE

## 2022-09-28 PROCEDURE — 36415 COLL VENOUS BLD VENIPUNCTURE: CPT | Performed by: PHYSICIAN ASSISTANT

## 2022-09-28 PROCEDURE — 99024 POSTOP FOLLOW-UP VISIT: CPT | Performed by: SURGERY

## 2022-09-28 PROCEDURE — G1004 CDSM NDSC: HCPCS

## 2022-09-28 PROCEDURE — 96366 THER/PROPH/DIAG IV INF ADDON: CPT

## 2022-09-28 PROCEDURE — 85007 BL SMEAR W/DIFF WBC COUNT: CPT | Performed by: PHYSICIAN ASSISTANT

## 2022-09-28 RX ORDER — ONDANSETRON 2 MG/ML
4 INJECTION INTRAMUSCULAR; INTRAVENOUS EVERY 6 HOURS PRN
Status: DISCONTINUED | OUTPATIENT
Start: 2022-09-28 | End: 2022-10-04 | Stop reason: HOSPADM

## 2022-09-28 RX ORDER — DICYCLOMINE HCL 20 MG
20 TABLET ORAL
Status: DISCONTINUED | OUTPATIENT
Start: 2022-09-28 | End: 2022-10-04 | Stop reason: HOSPADM

## 2022-09-28 RX ORDER — HYDROMORPHONE HCL/PF 1 MG/ML
SYRINGE (ML) INJECTION
Status: COMPLETED
Start: 2022-09-28 | End: 2022-09-28

## 2022-09-28 RX ORDER — AMOXICILLIN AND CLAVULANATE POTASSIUM 875; 125 MG/1; MG/1
1 TABLET, FILM COATED ORAL 2 TIMES DAILY
Status: DISCONTINUED | OUTPATIENT
Start: 2022-09-28 | End: 2022-09-28

## 2022-09-28 RX ORDER — HYDROMORPHONE HCL/PF 1 MG/ML
0.5 SYRINGE (ML) INJECTION
Status: DISCONTINUED | OUTPATIENT
Start: 2022-09-28 | End: 2022-10-04

## 2022-09-28 RX ORDER — ACETAMINOPHEN 325 MG/1
650 TABLET ORAL EVERY 6 HOURS
Status: COMPLETED | OUTPATIENT
Start: 2022-09-28 | End: 2022-09-29

## 2022-09-28 RX ORDER — OXYCODONE HYDROCHLORIDE 10 MG/1
10 TABLET ORAL EVERY 4 HOURS PRN
Status: DISCONTINUED | OUTPATIENT
Start: 2022-09-28 | End: 2022-10-04 | Stop reason: HOSPADM

## 2022-09-28 RX ORDER — POTASSIUM CHLORIDE 20 MEQ/1
40 TABLET, EXTENDED RELEASE ORAL ONCE
Status: COMPLETED | OUTPATIENT
Start: 2022-09-28 | End: 2022-09-28

## 2022-09-28 RX ORDER — METHOCARBAMOL 500 MG/1
1000 TABLET, FILM COATED ORAL EVERY 6 HOURS SCHEDULED
Status: DISCONTINUED | OUTPATIENT
Start: 2022-09-28 | End: 2022-10-04 | Stop reason: HOSPADM

## 2022-09-28 RX ORDER — HYDROMORPHONE HCL/PF 1 MG/ML
0.5 SYRINGE (ML) INJECTION ONCE
Status: COMPLETED | OUTPATIENT
Start: 2022-09-28 | End: 2022-09-28

## 2022-09-28 RX ORDER — OXYCODONE HYDROCHLORIDE 5 MG/1
5 TABLET ORAL EVERY 4 HOURS PRN
Status: DISCONTINUED | OUTPATIENT
Start: 2022-09-28 | End: 2022-10-04 | Stop reason: HOSPADM

## 2022-09-28 RX ORDER — ENOXAPARIN SODIUM 100 MG/ML
40 INJECTION SUBCUTANEOUS DAILY
Status: DISCONTINUED | OUTPATIENT
Start: 2022-09-28 | End: 2022-10-04 | Stop reason: HOSPADM

## 2022-09-28 RX ORDER — SODIUM CHLORIDE, SODIUM GLUCONATE, SODIUM ACETATE, POTASSIUM CHLORIDE, MAGNESIUM CHLORIDE, SODIUM PHOSPHATE, DIBASIC, AND POTASSIUM PHOSPHATE .53; .5; .37; .037; .03; .012; .00082 G/100ML; G/100ML; G/100ML; G/100ML; G/100ML; G/100ML; G/100ML
125 INJECTION, SOLUTION INTRAVENOUS CONTINUOUS
Status: DISCONTINUED | OUTPATIENT
Start: 2022-09-28 | End: 2022-10-01

## 2022-09-28 RX ORDER — MAGNESIUM HYDROXIDE/ALUMINUM HYDROXICE/SIMETHICONE 120; 1200; 1200 MG/30ML; MG/30ML; MG/30ML
30 SUSPENSION ORAL EVERY 6 HOURS PRN
Status: DISCONTINUED | OUTPATIENT
Start: 2022-09-28 | End: 2022-10-04 | Stop reason: HOSPADM

## 2022-09-28 RX ORDER — SACCHAROMYCES BOULARDII 250 MG
250 CAPSULE ORAL 2 TIMES DAILY
Status: DISCONTINUED | OUTPATIENT
Start: 2022-09-28 | End: 2022-10-04 | Stop reason: HOSPADM

## 2022-09-28 RX ORDER — GABAPENTIN 400 MG/1
400 CAPSULE ORAL 4 TIMES DAILY
Status: DISCONTINUED | OUTPATIENT
Start: 2022-09-28 | End: 2022-10-04 | Stop reason: HOSPADM

## 2022-09-28 RX ADMIN — OXYCODONE HYDROCHLORIDE 10 MG: 10 TABLET ORAL at 17:30

## 2022-09-28 RX ADMIN — HYDROMORPHONE HYDROCHLORIDE 0.5 MG: 1 INJECTION, SOLUTION INTRAMUSCULAR; INTRAVENOUS; SUBCUTANEOUS at 04:46

## 2022-09-28 RX ADMIN — IOHEXOL 85 ML: 350 INJECTION, SOLUTION INTRAVENOUS at 03:20

## 2022-09-28 RX ADMIN — DICYCLOMINE HYDROCHLORIDE 20 MG: 20 TABLET ORAL at 17:28

## 2022-09-28 RX ADMIN — GABAPENTIN 400 MG: 400 CAPSULE ORAL at 08:39

## 2022-09-28 RX ADMIN — ENOXAPARIN SODIUM 40 MG: 40 INJECTION SUBCUTANEOUS at 08:39

## 2022-09-28 RX ADMIN — Medication 125 MG: at 06:40

## 2022-09-28 RX ADMIN — DICYCLOMINE HYDROCHLORIDE 20 MG: 20 TABLET ORAL at 11:42

## 2022-09-28 RX ADMIN — Medication 125 MG: at 17:28

## 2022-09-28 RX ADMIN — SODIUM CHLORIDE, SODIUM GLUCONATE, SODIUM ACETATE, POTASSIUM CHLORIDE, MAGNESIUM CHLORIDE, SODIUM PHOSPHATE, DIBASIC, AND POTASSIUM PHOSPHATE 100 ML/HR: .53; .5; .37; .037; .03; .012; .00082 INJECTION, SOLUTION INTRAVENOUS at 05:52

## 2022-09-28 RX ADMIN — METHOCARBAMOL TABLETS 1000 MG: 500 TABLET, COATED ORAL at 11:42

## 2022-09-28 RX ADMIN — GABAPENTIN 400 MG: 400 CAPSULE ORAL at 23:13

## 2022-09-28 RX ADMIN — ACETAMINOPHEN 650 MG: 325 TABLET, FILM COATED ORAL at 17:28

## 2022-09-28 RX ADMIN — METHOCARBAMOL TABLETS 1000 MG: 500 TABLET, COATED ORAL at 23:13

## 2022-09-28 RX ADMIN — ACETAMINOPHEN 650 MG: 325 TABLET, FILM COATED ORAL at 05:51

## 2022-09-28 RX ADMIN — AMOXICILLIN AND CLAVULANATE POTASSIUM 1 TABLET: 875; 125 TABLET, FILM COATED ORAL at 08:38

## 2022-09-28 RX ADMIN — HYDROMORPHONE HYDROCHLORIDE 0.5 MG: 1 INJECTION, SOLUTION INTRAMUSCULAR; INTRAVENOUS; SUBCUTANEOUS at 02:05

## 2022-09-28 RX ADMIN — POTASSIUM CHLORIDE 40 MEQ: 1500 TABLET, EXTENDED RELEASE ORAL at 13:00

## 2022-09-28 RX ADMIN — HYDROMORPHONE HYDROCHLORIDE 0.5 MG: 1 INJECTION, SOLUTION INTRAMUSCULAR; INTRAVENOUS; SUBCUTANEOUS at 08:38

## 2022-09-28 RX ADMIN — DICYCLOMINE HYDROCHLORIDE 20 MG: 20 TABLET ORAL at 23:12

## 2022-09-28 RX ADMIN — OXYCODONE HYDROCHLORIDE 5 MG: 5 TABLET ORAL at 23:12

## 2022-09-28 RX ADMIN — ACETAMINOPHEN 650 MG: 325 TABLET, FILM COATED ORAL at 23:12

## 2022-09-28 RX ADMIN — METHOCARBAMOL TABLETS 1000 MG: 500 TABLET, COATED ORAL at 05:51

## 2022-09-28 RX ADMIN — ACETAMINOPHEN 650 MG: 325 TABLET, FILM COATED ORAL at 10:29

## 2022-09-28 RX ADMIN — GABAPENTIN 400 MG: 400 CAPSULE ORAL at 17:28

## 2022-09-28 RX ADMIN — SODIUM CHLORIDE, SODIUM GLUCONATE, SODIUM ACETATE, POTASSIUM CHLORIDE, MAGNESIUM CHLORIDE, SODIUM PHOSPHATE, DIBASIC, AND POTASSIUM PHOSPHATE 100 ML/HR: .53; .5; .37; .037; .03; .012; .00082 INJECTION, SOLUTION INTRAVENOUS at 17:28

## 2022-09-28 RX ADMIN — DICYCLOMINE HYDROCHLORIDE 20 MG: 20 TABLET ORAL at 06:40

## 2022-09-28 RX ADMIN — OXYCODONE HYDROCHLORIDE 10 MG: 10 TABLET ORAL at 10:29

## 2022-09-28 RX ADMIN — HYDROMORPHONE HYDROCHLORIDE 0.5 MG: 1 INJECTION, SOLUTION INTRAMUSCULAR; INTRAVENOUS; SUBCUTANEOUS at 13:00

## 2022-09-28 RX ADMIN — GABAPENTIN 400 MG: 400 CAPSULE ORAL at 11:42

## 2022-09-28 RX ADMIN — Medication 125 MG: at 23:12

## 2022-09-28 RX ADMIN — Medication 250 MG: at 08:39

## 2022-09-28 RX ADMIN — METHOCARBAMOL TABLETS 1000 MG: 500 TABLET, COATED ORAL at 17:28

## 2022-09-28 RX ADMIN — SODIUM CHLORIDE, SODIUM GLUCONATE, SODIUM ACETATE, POTASSIUM CHLORIDE, MAGNESIUM CHLORIDE, SODIUM PHOSPHATE, DIBASIC, AND POTASSIUM PHOSPHATE 1000 ML: .53; .5; .37; .037; .03; .012; .00082 INJECTION, SOLUTION INTRAVENOUS at 00:54

## 2022-09-28 RX ADMIN — Medication 125 MG: at 11:41

## 2022-09-28 RX ADMIN — Medication 250 MG: at 17:28

## 2022-09-28 RX ADMIN — Medication 0.5 MG: at 02:05
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
## 2022-09-28 NOTE — ASSESSMENT & PLAN NOTE
· POA, as evidenced by leukocytosis and tachycardia in setting of possible C  Diff infection   · Continue PO Vancomycin empirically  · C   Diff PCR pending, follow up results   · Hold additional systemic abx   · Trend WBC and fever curve closely   · IVF hydration   · Procal and lactic negative
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
## 2022-09-28 NOTE — ASSESSMENT & PLAN NOTE
· Sodium 131  · Likely in setting of volume depletion from diarrheal illness  · Continue IVF hydration at 100 cc/hr   · Trend BMP closely

## 2022-09-28 NOTE — PROGRESS NOTES
Vancomycin Assessment    Jewel Sultana is a 35 y o  female who is currently receiving vancomycin 1000mg every 12 hours for intra-abdominal infection     Relevant clinical data and objective history reviewed:  Creatinine   Date Value Ref Range Status   09/27/2022 0 71 0 60 - 1 30 mg/dL Final     Comment:     Standardized to IDMS reference method   09/21/2022 0 63 0 60 - 1 30 mg/dL Final     Comment:     Standardized to IDMS reference method   09/20/2022 0 62 0 60 - 1 30 mg/dL Final     Comment:     Standardized to IDMS reference method     /60 (BP Location: Right arm)   Pulse 98   Temp 97 9 °F (36 6 °C) (Tympanic)   Resp 18   SpO2 100%   No intake/output data recorded  Lab Results   Component Value Date/Time    BUN 9 09/27/2022 06:36 PM    WBC 27 10 (H) 09/27/2022 06:36 PM    HGB 10 5 (L) 09/27/2022 06:36 PM    HCT 31 9 (L) 09/27/2022 06:36 PM    MCV 93 09/27/2022 06:36 PM     (H) 09/27/2022 06:36 PM     Temp Readings from Last 3 Encounters:   09/27/22 97 9 °F (36 6 °C) (Tympanic)   09/27/22 100 2 °F (37 9 °C)   09/26/22 98 4 °F (36 9 °C) (Temporal)     Vancomycin Days of Therapy: 1    Assessment/Plan  The patient is currently on vancomycin utilizing scheduled dosing based on actual body weight  Baseline risks associated with therapy include: pre-existing renal impairment  The patient is currently receiving 1000mg every 12 hours and is clinically appropriate and dose will be continued  Pharmacy will also follow closely for s/sx of nephrotoxicity, infusion reactions, and appropriateness of therapy  BMP and CBC will be ordered per protocol  Plan for trough as patient approaches steady state, prior to the 4th  dose at approximately on 09/29/22 at 08 30  Due to infection severity, will target a trough of 15-20 (appropriate for most indications)   Pharmacy will continue to follow the patients culture results and clinical progress daily      Rachael Ham
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
## 2022-09-28 NOTE — ED NOTES
Pt rang call bell for RN, pt's family noticed pt's face was turning red, RN stopped IV antibiotics, pt presents with no symptoms/discomfort/distress at this time, resident made aware, will continue to monitor pt for any changes in symptoms     Cheryl Stanford RN  09/28/22 [id number]
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
## 2022-09-28 NOTE — SEPSIS NOTE
Sepsis Note   Abhishek Gambino 35 y o  female MRN: [id number]  Unit/Bed#: ED 27 Encounter: [id number]       qSOFA     [address] Name 09/27/22 2245 09/27/22 1820             Altered mental status GCS < 15 -- --       Respiratory Rate > / =61 0 0       Systolic BP < / =055 0 0       Q Sofa Score 0 0                  Initial Sepsis Screening     Row Name 09/27/22 2209                Is the patient's history suggestive of a new or worsening infection? Yes (Proceed)  -GJ        Suspected source of infection suspect infection, source unknown  -GJ        Are two or more of the following signs & symptoms of infection both present and new to the patient? Yes (Proceed)  -GJ        Indicate SIRS criteria Leukocytosis (WBC > 31362 IJL); Tachycardia > 90 bpm  -GJ        If the answer is yes to both questions, suspicion of sepsis is present --        If severe sepsis is present AND tissue hypoperfusion perists in the hour after fluid resuscitation or lactate > 4, the patient meets criteria for SEPTIC SHOCK --        Are any of the following organ dysfunction criteria present within 6 hours of suspected infection and SIRS criteria that are NOT considered to be chronic conditions?  No  -GJ        Organ dysfunction --        Date of presentation of severe sepsis --        Time of presentation of severe sepsis --        Tissue hypoperfusion persists in the hour after crystalloid fluid administration, evidenced, by either: --        Was hypotension present within one hour of the conclusion of crystalloid fluid administration? --        Date of presentation of septic shock --        Time of presentation of septic shock --              User Key  (r) = Recorded By, (t) = Taken By, (c) = Cosigned By    [id number]  Name Provider Type    [id number] [facility] Resident                  [id number] Eric Javier (last 720 hours)     Sepsis [address] Name 09/27/22 2329                   Repeat Volume Status and Tissue Perfusion Assessment Performed    Repeat Volume Status and Tissue Perfusion Assessment Performed Yes  -GJ                  Volume Status and Tissue Perfusion Post Fluid Resuscitation * Must Document All *    Vital Signs Reviewed (HR, RR, BP, T) Yes  -GJ        Shock Index Reviewed Yes  -GJ        Arterial Oxygen Saturation Reviewed (POx, SaO2 or SpO2) --        Cardio Normal S1/S2; Regular rate and rhythm; No murmor; No rub or gallop  -GJ        Pulmonary Normal effort;Clear to auscultation  -GJ        Capillary Refill Brisk  -GJ        Peripheral Pulses Radial  -GJ        Peripheral Pulse +2  -GJ        Posterior Tibialis --        Skin Warm;Dry  -GJ        Urine output assessed Adequate  -GJ                  *OR*   Intensive Monitoring- Must Document One of the Following Four *:    Vital Signs Reviewed --        * Central Venous Pressure (CVP or RAP) --        * Central Venous Oxygen (SVO2, ScvO2 or Oxygen saturation via central catheter) --        * Bedside Cardiovascular US in IVC diameter and % collapse --        * Passive Leg Raise OR Crystalloid Challenge --              User Key  (r) = Recorded By, (t) = Taken By, (c) = Cosigned By    Initials Name Provider Type    [address] [place] [address]
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
## 2022-09-28 NOTE — ED ATTENDING ATTESTATION
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 hours for 14 days   saccharomyces boulardii (FLORASTOR) 250 mg capsule   No No   Sig: Take 1 capsule (250 mg total) by mouth 2 (two) times a day      Facility-Administered Medications: None       Portions of the record may have been created with voice recognition software  Occasional wrong word or "sound a like" substitutions may have occurred due to the inherent limitations of voice recognition software  Read the chart carefully and recognize, using context, where substitutions have occurred      Electronically signed by:  Karey Barrios

## 2022-09-28 NOTE — ED CARE HANDOFF
Emergency Department Sign Out Note        Sign out and transfer of care from ***  See Separate Emergency Department note  The patient, Ella Montgomery, was evaluated by the previous provider for ***  Workup Completed:  ***    ED Course / Workup Pending (followup):  ***                    Sepsis Reassessment    Flowsheet Row Most Recent Value   Volume Status and Tissue Perfusion Post Fluid Resuscitation * Must Document All *    Vital Signs Reviewed (HR, RR, BP, T) Yes Filed at: 09/27/2022 2329   Shock Index Reviewed Yes Filed at: 09/27/2022 2329   Arterial Oxygen Saturation Reviewed (POx, SaO2 or SpO2) --   Cardio Normal S1/S2, Regular rate and rhythm, No murmor, No rub or gallop Filed at: 09/27/2022 2329   Pulmonary Normal effort, Clear to auscultation Filed at: 09/27/2022 2329   Capillary Refill Brisk Filed at: 09/27/2022 2329   Peripheral Pulses Radial Filed at: 09/27/2022 2329   Peripheral Pulse +2 Filed at: 09/27/2022 2329   Dorsalis Pedis --   Posterior Tibialis --   Skin Warm, Dry Filed at: 09/27/2022 2329   Urine output assessed Adequate Filed at: 09/27/2022 2329   *OR*   Intensive Monitoring- Must Document One of the Following Four *:    Vital Signs Reviewed --   * Central Venous Pressure (CVP or RAP) --   * Central Venous Oxygen (SVO2, ScvO2 or Oxygen saturation via central catheter) --   * Bedside Cardiovascular US in IVC diameter and % collapse --   CO --   EF --   IVC Diameter --   % Collapse --   * Passive Leg Raise OR Crystalloid Challenge --   Passive leg exam --   Crystalloid fluid challenge completed --                    ED Course as of 09/28/22 0418   Tue Sep 27, 2022   2316 SO: Recent distal pancreatectomy and splenectomy complicated with fluid collection that was managed conservatively; DCed on the 22nd but now with increasing pain and diarrhea; Fevers at home; Elevated WBC; Intraabdominal abnormality vs C  Diff; Awaiting lactate for sepsis alert;  Admit to surg vs medicine based on CT; Abx already in process; Pain improved with dilaudid   2356 FLU/RSV/COVID - if FLU/RSV clinically relevant  Negative  Wed Sep 28, 2022   0049 Pt apparently with vanco hypersensitivity  Reddened face but otherwise asymptomatic  No respiratory complaints  0236 Procalcitonin: 0 20  WNL   0243 Awaiting CT     0418 Pt updated on results and plan for admission to medicine  Pt agreeable  SLIM contacted for admission  Procedures  MDM        Disposition  Final diagnoses:   None     ED Disposition     None      Follow-up Information    None       Patient's Medications   Discharge Prescriptions    No medications on file     No discharge procedures on file         ED Provider  Electronically Signed by With Specialties Details Why Contact Info Additional Information    Miriam Ramirez [address] [facility]  Follow up  [address] [facility] [address] [facility]  [phone]       [address] General Surgery Go on 10/10/2022  [address] [facility] [address] [facility] [address], [address] [facility], [facility], [address] [facility], [address] [facility]        Discharge Medication List as of 10/4/2022 10:12 AM      START taking these medications    Details   cholestyramine sugar free (QUESTRAN LIGHT) 4 g packet Take 1 packet (4 g total) by mouth 2 (two) times a day, Starting Tue 10/4/2022, Normal      dicyclomine (BENTYL) 20 mg tablet Take 1 tablet (20 mg total) by mouth 4 (four) times a day (before meals and at bedtime), Starting Tue 10/4/2022, Normal      oxyCODONE (ROXICODONE) 5 immediate release tablet Take 1 tablet (5 mg total) by mouth every 4 (four) hours as needed for moderate pain for up to 10 days Continuation of therapy Max Daily Amount: 30 mg, Starting Tue 10/4/2022, Until Fri 10/14/2022 at 2359, Normal      vancomycin (VANCOCIN) 50mg/mL SOLN Take 10 mL (500 mg total) by mouth every 6 (six) hours for 10 days, Starting Tue 10/4/2022, Until Fri 10/14/2022, Normal         CONTINUE these medications which have CHANGED    Details   acetaminophen (TYLENOL) 325 mg tablet Take 3 tablets (975 mg total) by mouth every 6 (six) hours as needed for mild pain, Starting Tue 10/4/2022, No Print         CONTINUE these medications which have NOT CHANGED    Details   Alcohol Swabs 70 % PADS May substitute brand based on insurance coverage  Check glucose BID , Normal      Blood Glucose Monitoring Suppl (OneTouch Verio Reflect) w/Device KIT May substitute brand based on insurance coverage   Check glucose BID , Normal      gabapentin (NEURONTIN) 400 mg capsule Take 1 capsule (400 mg total) by mouth 4 (four) times a day for 14 days, Starting Fri 9/16/2022, Until Fri 9/30/2022, Normal      glucose blood (OneTouch Verio) test strip May substitute brand based on insurance coverage  Check glucose BID , Normal      methocarbamol (ROBAXIN) 500 mg tablet Take 2 tablets (1,000 mg total) by mouth every 6 (six) hours for 14 days, Starting Fri 9/16/2022, Until Fri 9/30/2022, Normal      OneTouch Delica Lancets 62R MISC May substitute brand based on insurance coverage  Check glucose BID , Normal      saccharomyces boulardii (FLORASTOR) 250 mg capsule Take 1 capsule (250 mg total) by mouth 2 (two) times a day, Starting Tue 9/27/2022, Normal         STOP taking these medications       amoxicillin-clavulanate (AUGMENTIN) 875-125 mg per tablet Comments:   Reason for Stopping:         docusate sodium (COLACE) 100 mg capsule Comments:   Reason for Stopping:             Outpatient Discharge Orders   EXTERNAL: Ambulatory Referral to Home Health   Standing Status: Future Standing Exp   Date: 10/04/23      Discharge Diet     Activity as tolerated     Call provider for:  persistent nausea or vomiting     Call provider for:  severe uncontrolled pain     Call provider for:  redness, tenderness, or signs of infection (pain, swelling, redness, odor or green/yellow discharge around incision site)     Call provider for:     Remove dressing in 24 hours          ED Provider  Electronically Signed by     Tawanna Barrett MD  10/11/22 0613
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
## 2022-09-29 ENCOUNTER — HOME CARE VISIT (OUTPATIENT)
Dept: HOME HEALTH SERVICES | Facility: HOME HEALTHCARE | Age: 42
End: 2022-09-29
Payer: COMMERCIAL

## 2022-09-29 LAB
ANION GAP SERPL CALCULATED.3IONS-SCNC: 7 MMOL/L (ref 4–13)
BASOPHILS # BLD AUTO: 0.1 THOUSANDS/ÂΜL (ref 0–0.1)
BASOPHILS NFR BLD AUTO: 0 % (ref 0–1)
BUN SERPL-MCNC: 5 MG/DL (ref 5–25)
CALCIUM SERPL-MCNC: 8.1 MG/DL (ref 8.3–10.1)
CHLORIDE SERPL-SCNC: 98 MMOL/L (ref 96–108)
CO2 SERPL-SCNC: 26 MMOL/L (ref 21–32)
CREAT SERPL-MCNC: 0.58 MG/DL (ref 0.6–1.3)
EOSINOPHIL # BLD AUTO: 0.06 THOUSAND/ÂΜL (ref 0–0.61)
EOSINOPHIL NFR BLD AUTO: 0 % (ref 0–6)
ERYTHROCYTE [DISTWIDTH] IN BLOOD BY AUTOMATED COUNT: 13.7 % (ref 11.6–15.1)
GFR SERPL CREATININE-BSD FRML MDRD: 113 ML/MIN/1.73SQ M
GLUCOSE SERPL-MCNC: 124 MG/DL (ref 65–140)
HCT VFR BLD AUTO: 30.3 % (ref 34.8–46.1)
HGB BLD-MCNC: 9.9 G/DL (ref 11.5–15.4)
IMM GRANULOCYTES # BLD AUTO: >0.5 THOUSAND/UL (ref 0–0.2)
IMM GRANULOCYTES NFR BLD AUTO: 1 % (ref 0–2)
LYMPHOCYTES # BLD AUTO: 1.25 THOUSANDS/ÂΜL (ref 0.6–4.47)
LYMPHOCYTES NFR BLD AUTO: 3 % (ref 14–44)
MAGNESIUM SERPL-MCNC: 2 MG/DL (ref 1.6–2.6)
MCH RBC QN AUTO: 30.9 PG (ref 26.8–34.3)
MCHC RBC AUTO-ENTMCNC: 32.7 G/DL (ref 31.4–37.4)
MCV RBC AUTO: 95 FL (ref 82–98)
MONOCYTES # BLD AUTO: 3.52 THOUSAND/ÂΜL (ref 0.17–1.22)
MONOCYTES NFR BLD AUTO: 9 % (ref 4–12)
NEUTROPHILS # BLD AUTO: 35.62 THOUSANDS/ÂΜL (ref 1.85–7.62)
NEUTS SEG NFR BLD AUTO: 87 % (ref 43–75)
NRBC BLD AUTO-RTO: 0 /100 WBCS
PLATELET # BLD AUTO: 636 THOUSANDS/UL (ref 149–390)
PMV BLD AUTO: 9.8 FL (ref 8.9–12.7)
POTASSIUM SERPL-SCNC: 3.8 MMOL/L (ref 3.5–5.3)
RBC # BLD AUTO: 3.2 MILLION/UL (ref 3.81–5.12)
SODIUM SERPL-SCNC: 131 MMOL/L (ref 135–147)
WBC # BLD AUTO: 41.06 THOUSAND/UL (ref 4.31–10.16)

## 2022-09-29 PROCEDURE — 99024 POSTOP FOLLOW-UP VISIT: CPT | Performed by: SURGERY

## 2022-09-29 PROCEDURE — 85025 COMPLETE CBC W/AUTO DIFF WBC: CPT | Performed by: SURGERY

## 2022-09-29 PROCEDURE — 83735 ASSAY OF MAGNESIUM: CPT | Performed by: INTERNAL MEDICINE

## 2022-09-29 PROCEDURE — 80048 BASIC METABOLIC PNL TOTAL CA: CPT | Performed by: SURGERY

## 2022-09-29 RX ORDER — METRONIDAZOLE 500 MG/100ML
500 INJECTION, SOLUTION INTRAVENOUS EVERY 8 HOURS
Status: DISCONTINUED | OUTPATIENT
Start: 2022-09-29 | End: 2022-10-03

## 2022-09-29 RX ORDER — METRONIDAZOLE 500 MG/100ML
500 INJECTION, SOLUTION INTRAVENOUS EVERY 8 HOURS
Status: DISCONTINUED | OUTPATIENT
Start: 2022-09-29 | End: 2022-09-29

## 2022-09-29 RX ADMIN — SODIUM CHLORIDE, SODIUM GLUCONATE, SODIUM ACETATE, POTASSIUM CHLORIDE, MAGNESIUM CHLORIDE, SODIUM PHOSPHATE, DIBASIC, AND POTASSIUM PHOSPHATE 100 ML/HR: .53; .5; .37; .037; .03; .012; .00082 INJECTION, SOLUTION INTRAVENOUS at 06:03

## 2022-09-29 RX ADMIN — METHOCARBAMOL TABLETS 1000 MG: 500 TABLET, COATED ORAL at 11:16

## 2022-09-29 RX ADMIN — HYDROMORPHONE HYDROCHLORIDE 0.5 MG: 1 INJECTION, SOLUTION INTRAMUSCULAR; INTRAVENOUS; SUBCUTANEOUS at 09:34

## 2022-09-29 RX ADMIN — SODIUM CHLORIDE, SODIUM GLUCONATE, SODIUM ACETATE, POTASSIUM CHLORIDE, MAGNESIUM CHLORIDE, SODIUM PHOSPHATE, DIBASIC, AND POTASSIUM PHOSPHATE 100 ML/HR: .53; .5; .37; .037; .03; .012; .00082 INJECTION, SOLUTION INTRAVENOUS at 15:18

## 2022-09-29 RX ADMIN — ACETAMINOPHEN 650 MG: 325 TABLET, FILM COATED ORAL at 23:41

## 2022-09-29 RX ADMIN — Medication 250 MG: at 17:43

## 2022-09-29 RX ADMIN — SODIUM CHLORIDE, SODIUM LACTATE, POTASSIUM CHLORIDE, AND CALCIUM CHLORIDE 1000 ML: .6; .31; .03; .02 INJECTION, SOLUTION INTRAVENOUS at 13:53

## 2022-09-29 RX ADMIN — OXYCODONE HYDROCHLORIDE 10 MG: 10 TABLET ORAL at 06:11

## 2022-09-29 RX ADMIN — METHOCARBAMOL TABLETS 1000 MG: 500 TABLET, COATED ORAL at 17:43

## 2022-09-29 RX ADMIN — ACETAMINOPHEN 650 MG: 325 TABLET, FILM COATED ORAL at 06:04

## 2022-09-29 RX ADMIN — ENOXAPARIN SODIUM 40 MG: 40 INJECTION SUBCUTANEOUS at 09:26

## 2022-09-29 RX ADMIN — Medication 125 MG: at 12:00

## 2022-09-29 RX ADMIN — METHOCARBAMOL TABLETS 1000 MG: 500 TABLET, COATED ORAL at 06:04

## 2022-09-29 RX ADMIN — GABAPENTIN 400 MG: 400 CAPSULE ORAL at 11:16

## 2022-09-29 RX ADMIN — Medication 250 MG: at 09:27

## 2022-09-29 RX ADMIN — Medication 125 MG: at 23:40

## 2022-09-29 RX ADMIN — GABAPENTIN 400 MG: 400 CAPSULE ORAL at 23:41

## 2022-09-29 RX ADMIN — METHOCARBAMOL TABLETS 1000 MG: 500 TABLET, COATED ORAL at 23:41

## 2022-09-29 RX ADMIN — OXYCODONE HYDROCHLORIDE 10 MG: 10 TABLET ORAL at 19:30

## 2022-09-29 RX ADMIN — SODIUM CHLORIDE, SODIUM LACTATE, POTASSIUM CHLORIDE, AND CALCIUM CHLORIDE 1000 ML: .6; .31; .03; .02 INJECTION, SOLUTION INTRAVENOUS at 01:45

## 2022-09-29 RX ADMIN — DICYCLOMINE HYDROCHLORIDE 20 MG: 20 TABLET ORAL at 11:16

## 2022-09-29 RX ADMIN — METRONIDAZOLE 500 MG: 500 INJECTION, SOLUTION INTRAVENOUS at 23:41

## 2022-09-29 RX ADMIN — METRONIDAZOLE 500 MG: 500 INJECTION, SOLUTION INTRAVENOUS at 17:43

## 2022-09-29 RX ADMIN — GABAPENTIN 400 MG: 400 CAPSULE ORAL at 17:43

## 2022-09-29 RX ADMIN — DICYCLOMINE HYDROCHLORIDE 20 MG: 20 TABLET ORAL at 23:41

## 2022-09-29 RX ADMIN — OXYCODONE HYDROCHLORIDE 5 MG: 5 TABLET ORAL at 15:24

## 2022-09-29 RX ADMIN — Medication 125 MG: at 06:04

## 2022-09-29 RX ADMIN — ACETAMINOPHEN 650 MG: 325 TABLET, FILM COATED ORAL at 11:16

## 2022-09-29 RX ADMIN — GABAPENTIN 400 MG: 400 CAPSULE ORAL at 09:28

## 2022-09-29 RX ADMIN — HYDROMORPHONE HYDROCHLORIDE 0.5 MG: 1 INJECTION, SOLUTION INTRAMUSCULAR; INTRAVENOUS; SUBCUTANEOUS at 20:55

## 2022-09-29 RX ADMIN — OXYCODONE HYDROCHLORIDE 10 MG: 10 TABLET ORAL at 11:12

## 2022-09-29 RX ADMIN — DICYCLOMINE HYDROCHLORIDE 20 MG: 20 TABLET ORAL at 06:04

## 2022-09-29 RX ADMIN — ACETAMINOPHEN 650 MG: 325 TABLET, FILM COATED ORAL at 17:43

## 2022-09-29 RX ADMIN — METRONIDAZOLE 500 MG: 500 INJECTION, SOLUTION INTRAVENOUS at 08:00

## 2022-09-29 RX ADMIN — DICYCLOMINE HYDROCHLORIDE 20 MG: 20 TABLET ORAL at 17:43

## 2022-09-29 RX ADMIN — Medication 125 MG: at 17:42

## 2022-09-29 NOTE — PLAN OF CARE
Problem: PAIN - ADULT  Goal: Verbalizes/displays adequate comfort level or baseline comfort level  Description: Interventions:  - Encourage patient to monitor pain and request assistance  - Assess pain using appropriate pain scale  - Administer analgesics based on type and severity of pain and evaluate response  - Implement non-pharmacological measures as appropriate and evaluate response  - Consider cultural and social influences on pain and pain management  - Notify physician/advanced practitioner if interventions unsuccessful or patient reports new pain  Outcome: Progressing     Problem: INFECTION - ADULT  Goal: Absence or prevention of progression during hospitalization  Description: INTERVENTIONS:  - Assess and monitor for signs and symptoms of infection  - Monitor lab/diagnostic results  - Monitor all insertion sites, i e  indwelling lines, tubes, and drains  - Monitor endotracheal if appropriate and nasal secretions for changes in amount and color  - [place] appropriate cooling/warming therapies per order  - Administer medications as ordered  - Instruct and encourage patient and family to use good hand hygiene technique  - Identify and instruct in appropriate isolation precautions for identified infection/condition  Outcome: Progressing  Goal: Absence of fever/infection during neutropenic period  Description: INTERVENTIONS:  - Monitor WBC    Outcome: Progressing

## 2022-09-29 NOTE — PROGRESS NOTES
Progress Note - Skyla Feliciano 35 y o  female MRN: [id number]    Unit/Bed#: [facility] 822-01 Encounter: [id number]      Assessment:  34 y/o F w h/o distal pancreatectomy splenectomy on 9/11 c/b cdiff  Vss  tachycardia overnight 120s  tmax 100 2    abd soft, mild tender, non distended  Bowel mvts: 5-6  Uop: minimal 2x unrecorded    Wbc 36-> 40    Plan:  Continue diet as tolerated  Continue ivf  Strict I/os  Continue oral vanco  Ambulate  Monitor wbc and fever curve  dvt ppx, lovenox    Subjective:   Feels about the same  Reports 6/10 abd pain  Bowel mvts are less frequent as prior  Denied blood in bowel mvt  Denied any fever or chills  Objective:     Vitals: Blood pressure 100/62, pulse (!) 122, temperature 100 2 °F (37 9 °C), resp  rate 17, height 5' 4" (1 626 m), weight 52 5 kg (115 lb 11 9 oz), SpO2 93 %, not currently breastfeeding  ,Body mass index is 19 87 kg/m²  Intake/Output Summary (Last 24 hours) at 9/29/2022 0733  Last data filed at 9/29/2022 0603  Gross per 24 hour   Intake 2060 ml   Output 10 ml   Net 2050 ml       Physical Exam  General: NAD  HEENT: NC/AT  MMM  Cv: RRR     Lungs: normal effort  Ab: Soft, NT/ND  Ex: no CCE  Neuro: AAOx3    Scheduled Meds:  Current Facility-Administered Medications   Medication Dose Route Frequency Provider Last Rate    acetaminophen  650 mg Oral Q6H Aashish Zamora MD      aluminum-magnesium hydroxide-simethicone  30 mL Oral Q6H PRN Kevyn Ervin MD      dicyclomine  20 mg Oral 4x Daily (AC & HS) Kevyn Ervin MD      enoxaparin  40 mg Subcutaneous Daily Kevyn Ervin MD      gabapentin  400 mg Oral 4x Daily Kevyn Ervin MD      HYDROmorphone  0 5 mg Intravenous Q1H PRN Kevyn Ervin MD      methocarbamol  1,000 mg Oral Q6H Maik Olson MD      multi-electrolyte  100 mL/hr Intravenous Continuous Kevyn Ervin  mL/hr (09/29/22 0603)    ondansetron  4 mg Intravenous Once [employer] Brent Hooper MD      ondansetron  4 mg Intravenous Q6H PRN Sunday Can MD      oxyCODONE  10 mg Oral Q4H PRN Sunday Can MD      oxyCODONE  5 mg Oral Q4H PRN Sunday Can MD      saccharomyces boulardii  250 mg Oral BID Sunday Can MD      vancomycin  125 mg Oral Q6H [facility] Sunday Can MD       Continuous Infusions:multi-electrolyte, 100 mL/hr, Last Rate: 100 mL/hr (09/29/22 0603)      PRN Meds:   aluminum-magnesium hydroxide-simethicone    HYDROmorphone    ondansetron    oxyCODONE    oxyCODONE      Invasive Devices  Report    Peripheral Intravenous Line  Duration           Peripheral IV 09/27/22 Left Forearm 1 day    Peripheral IV 09/27/22 Right;Ventral (anterior) Forearm 1 day          Drain  Duration           Closed/Suction Drain LUQ Bulb 19 Fr  17 days                Lab, Imaging and other studies: I have personally reviewed pertinent reports      VTE Pharmacologic Prophylaxis: Sequential compression device (Venodyne)   VTE Mechanical Prophylaxis: sequential compression device
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
## 2022-09-30 ENCOUNTER — APPOINTMENT (OUTPATIENT)
Dept: RADIOLOGY | Facility: HOSPITAL | Age: 42
DRG: 405 | End: 2022-09-30
Payer: COMMERCIAL

## 2022-09-30 LAB
ANION GAP SERPL CALCULATED.3IONS-SCNC: 7 MMOL/L (ref 4–13)
BUN SERPL-MCNC: 6 MG/DL (ref 5–25)
CALCIUM SERPL-MCNC: 7.6 MG/DL (ref 8.3–10.1)
CHLORIDE SERPL-SCNC: 97 MMOL/L (ref 96–108)
CO2 SERPL-SCNC: 26 MMOL/L (ref 21–32)
CREAT SERPL-MCNC: 0.51 MG/DL (ref 0.6–1.3)
ERYTHROCYTE [DISTWIDTH] IN BLOOD BY AUTOMATED COUNT: 13.9 % (ref 11.6–15.1)
GFR SERPL CREATININE-BSD FRML MDRD: 118 ML/MIN/1.73SQ M
GLUCOSE SERPL-MCNC: 122 MG/DL (ref 65–140)
HCT VFR BLD AUTO: 29.3 % (ref 34.8–46.1)
HGB BLD-MCNC: 9.5 G/DL (ref 11.5–15.4)
MCH RBC QN AUTO: 30.4 PG (ref 26.8–34.3)
MCHC RBC AUTO-ENTMCNC: 32.4 G/DL (ref 31.4–37.4)
MCV RBC AUTO: 94 FL (ref 82–98)
NRBC BLD AUTO-RTO: 0 /100 WBCS
PLATELET # BLD AUTO: 579 THOUSANDS/UL (ref 149–390)
PMV BLD AUTO: 9.7 FL (ref 8.9–12.7)
POTASSIUM SERPL-SCNC: 3.7 MMOL/L (ref 3.5–5.3)
RBC # BLD AUTO: 3.13 MILLION/UL (ref 3.81–5.12)
SODIUM SERPL-SCNC: 130 MMOL/L (ref 135–147)
WBC # BLD AUTO: 44.28 THOUSAND/UL (ref 4.31–10.16)

## 2022-09-30 PROCEDURE — 99024 POSTOP FOLLOW-UP VISIT: CPT | Performed by: SURGERY

## 2022-09-30 PROCEDURE — 99223 1ST HOSP IP/OBS HIGH 75: CPT | Performed by: INTERNAL MEDICINE

## 2022-09-30 PROCEDURE — 80048 BASIC METABOLIC PNL TOTAL CA: CPT | Performed by: SURGERY

## 2022-09-30 PROCEDURE — 74018 RADEX ABDOMEN 1 VIEW: CPT

## 2022-09-30 PROCEDURE — 85027 COMPLETE CBC AUTOMATED: CPT | Performed by: SURGERY

## 2022-09-30 RX ADMIN — METRONIDAZOLE 500 MG: 500 INJECTION, SOLUTION INTRAVENOUS at 16:38

## 2022-09-30 RX ADMIN — METRONIDAZOLE 500 MG: 500 INJECTION, SOLUTION INTRAVENOUS at 08:22

## 2022-09-30 RX ADMIN — METHOCARBAMOL TABLETS 1000 MG: 500 TABLET, COATED ORAL at 05:47

## 2022-09-30 RX ADMIN — OXYCODONE HYDROCHLORIDE 10 MG: 10 TABLET ORAL at 02:47

## 2022-09-30 RX ADMIN — GABAPENTIN 400 MG: 400 CAPSULE ORAL at 11:41

## 2022-09-30 RX ADMIN — DICYCLOMINE HYDROCHLORIDE 20 MG: 20 TABLET ORAL at 05:47

## 2022-09-30 RX ADMIN — OXYCODONE HYDROCHLORIDE 10 MG: 10 TABLET ORAL at 21:14

## 2022-09-30 RX ADMIN — DICYCLOMINE HYDROCHLORIDE 20 MG: 20 TABLET ORAL at 16:37

## 2022-09-30 RX ADMIN — METHOCARBAMOL TABLETS 1000 MG: 500 TABLET, COATED ORAL at 11:41

## 2022-09-30 RX ADMIN — HYDROMORPHONE HYDROCHLORIDE 0.5 MG: 1 INJECTION, SOLUTION INTRAMUSCULAR; INTRAVENOUS; SUBCUTANEOUS at 05:47

## 2022-09-30 RX ADMIN — METHOCARBAMOL TABLETS 1000 MG: 500 TABLET, COATED ORAL at 17:37

## 2022-09-30 RX ADMIN — Medication 250 MG: at 17:37

## 2022-09-30 RX ADMIN — VANCOMYCIN HYDROCHLORIDE 500 MG: 5 INJECTION, POWDER, LYOPHILIZED, FOR SOLUTION INTRAVENOUS at 17:37

## 2022-09-30 RX ADMIN — VANCOMYCIN HYDROCHLORIDE 500 MG: 5 INJECTION, POWDER, LYOPHILIZED, FOR SOLUTION INTRAVENOUS at 11:45

## 2022-09-30 RX ADMIN — SODIUM CHLORIDE, SODIUM GLUCONATE, SODIUM ACETATE, POTASSIUM CHLORIDE, MAGNESIUM CHLORIDE, SODIUM PHOSPHATE, DIBASIC, AND POTASSIUM PHOSPHATE 125 ML/HR: .53; .5; .37; .037; .03; .012; .00082 INJECTION, SOLUTION INTRAVENOUS at 17:37

## 2022-09-30 RX ADMIN — SODIUM CHLORIDE, SODIUM GLUCONATE, SODIUM ACETATE, POTASSIUM CHLORIDE, MAGNESIUM CHLORIDE, SODIUM PHOSPHATE, DIBASIC, AND POTASSIUM PHOSPHATE 125 ML/HR: .53; .5; .37; .037; .03; .012; .00082 INJECTION, SOLUTION INTRAVENOUS at 00:33

## 2022-09-30 RX ADMIN — ONDANSETRON 4 MG: 2 INJECTION INTRAMUSCULAR; INTRAVENOUS at 08:20

## 2022-09-30 RX ADMIN — OXYCODONE HYDROCHLORIDE 10 MG: 10 TABLET ORAL at 13:46

## 2022-09-30 RX ADMIN — ENOXAPARIN SODIUM 40 MG: 40 INJECTION SUBCUTANEOUS at 08:22

## 2022-09-30 RX ADMIN — SODIUM CHLORIDE, SODIUM GLUCONATE, SODIUM ACETATE, POTASSIUM CHLORIDE, MAGNESIUM CHLORIDE, SODIUM PHOSPHATE, DIBASIC, AND POTASSIUM PHOSPHATE 125 ML/HR: .53; .5; .37; .037; .03; .012; .00082 INJECTION, SOLUTION INTRAVENOUS at 08:30

## 2022-09-30 RX ADMIN — Medication 125 MG: at 05:47

## 2022-09-30 RX ADMIN — DICYCLOMINE HYDROCHLORIDE 20 MG: 20 TABLET ORAL at 21:14

## 2022-09-30 RX ADMIN — GABAPENTIN 400 MG: 400 CAPSULE ORAL at 08:22

## 2022-09-30 RX ADMIN — GABAPENTIN 400 MG: 400 CAPSULE ORAL at 21:14

## 2022-09-30 RX ADMIN — GABAPENTIN 400 MG: 400 CAPSULE ORAL at 17:37

## 2022-09-30 RX ADMIN — Medication 250 MG: at 08:22

## 2022-09-30 RX ADMIN — HYDROMORPHONE HYDROCHLORIDE 0.5 MG: 1 INJECTION, SOLUTION INTRAMUSCULAR; INTRAVENOUS; SUBCUTANEOUS at 16:37

## 2022-09-30 RX ADMIN — OXYCODONE HYDROCHLORIDE 5 MG: 5 TABLET ORAL at 08:35

## 2022-09-30 RX ADMIN — DICYCLOMINE HYDROCHLORIDE 20 MG: 20 TABLET ORAL at 11:41

## 2022-09-30 NOTE — PROGRESS NOTES
Progress Note - Lelia Ford 35 y o  female MRN: [id number]    Unit/Bed#: [facility] 822-01 Encounter: [id number]      Assessment:  34 y/o F w h/o distal pancreatectomy splenectomy on 9/11 c/b cdiff  Vss  Afebrile  tachycardia improving  abd soft, mild tender, non distended  Wbc: 41-> 44    Plan:  Regular diet  Continue oral vanco  ID consult appreciate recs  dvt ppx, lovenox  Ambulate  Serial abdominal exams  Subjective:   Feels about the same  Bowel mvts improving and less frequent  Tolerating a small diet  Denied nausea or vomiting  Denied fever or chills  Objective:     Vitals: Blood pressure 98/64, pulse (!) 120, temperature 99 5 °F (37 5 °C), resp  rate 16, height 5' 4" (1 626 m), weight 52 5 kg (115 lb 11 9 oz), SpO2 97 %, not currently breastfeeding  ,Body mass index is 19 87 kg/m²  Intake/Output Summary (Last 24 hours) at 9/29/2022 2119  Last data filed at 9/29/2022 1518  Gross per 24 hour   Intake 3415 ml   Output 10 ml   Net 3405 ml       Physical Exam  General: NAD  HEENT: NC/AT  MMM  Cv: RRR     Lungs: normal effort  Ab: Soft, NT/ND  Ex: no CCE  Neuro: AAOx3    Scheduled Meds:  Current Facility-Administered Medications   Medication Dose Route Frequency Provider Last Rate    acetaminophen  650 mg Oral Q6H Aashish Bar Brown MD      aluminum-magnesium hydroxide-simethicone  30 mL Oral Q6H PRN Sunday Can MD      dicyclomine  20 mg Oral 4x Daily (AC & HS) Sunday Can MD      enoxaparin  40 mg Subcutaneous Daily Sunday Can MD      gabapentin  400 mg Oral 4x Daily Sunday Can MD      HYDROmorphone  0 5 mg Intravenous Q1H PRN Sunday Can MD      methocarbamol  1,000 mg Oral Q6H Martir Torres MD      metroNIDAZOLE  500 mg Intravenous Q8H Lucia Lynn  mg (09/29/22 1743)    multi-electrolyte  125 mL/hr Intravenous Continuous Ben Alex  mL/hr (09/29/22 1736)    ondansetron  4 mg Intravenous Once Aashish Muna Alas MD      ondansetron  4 mg Intravenous Q6H PRN Polly Mueller MD      oxyCODONE  10 mg Oral Q4H PRN Polly Mueller MD      oxyCODONE  5 mg Oral Q4H PRN Polly Mueller MD      saccharomyces boulardii  250 mg Oral BID Polly Mueller MD      vancomycin  125 mg Oral Q6H [address] Polly Mueller MD       Continuous Infusions:multi-electrolyte, 125 mL/hr, Last Rate: 125 mL/hr (09/29/22 9116)      PRN Meds:   aluminum-magnesium hydroxide-simethicone    HYDROmorphone    ondansetron    oxyCODONE    oxyCODONE      Invasive Devices  Report    Peripheral Intravenous Line  Duration           Peripheral IV 09/27/22 Left Forearm 1 day    Peripheral IV 09/27/22 Right;Ventral (anterior) Forearm 1 day          Drain  Duration           Closed/Suction Drain LUQ Bulb 19 Fr  18 days                Lab, Imaging and other studies: I have personally reviewed pertinent reports      VTE Pharmacologic Prophylaxis: Sequential compression device (Venodyne)   VTE Mechanical Prophylaxis: sequential compression device

## 2022-09-30 NOTE — PLAN OF CARE
Problem: PAIN - ADULT  Goal: Verbalizes/displays adequate comfort level or baseline comfort level  Description: Interventions:  - Encourage patient to monitor pain and request assistance  - Assess pain using appropriate pain scale  - Administer analgesics based on type and severity of pain and evaluate response  - Implement non-pharmacological measures as appropriate and evaluate response  - Consider cultural and social influences on pain and pain management  - Notify physician/advanced practitioner if interventions unsuccessful or patient reports new pain  Outcome: Progressing     Problem: INFECTION - ADULT  Goal: Absence or prevention of progression during hospitalization  Description: INTERVENTIONS:  - Assess and monitor for signs and symptoms of infection  - Monitor lab/diagnostic results  - Monitor all insertion sites, i e  indwelling lines, tubes, and drains  - Monitor endotracheal if appropriate and nasal secretions for changes in amount and color  - [place] appropriate cooling/warming therapies per order  - Administer medications as ordered  - Instruct and encourage patient and family to use good hand hygiene technique  - Identify and instruct in appropriate isolation precautions for identified infection/condition  Outcome: Progressing     Problem: INFECTION - ADULT  Goal: Absence of fever/infection during neutropenic period  Description: INTERVENTIONS:  - Monitor WBC    Outcome: Progressing
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
## 2022-09-30 NOTE — QUICK NOTE
Nurse-Patient-Provider rounds were completed with the patient's nurse today, Kerri Fajardo  We discussed the plan is to continue to monitor white blood cell count and recheck a m  Labs  Continue antibiotics at this time  Follow-up ID planning  Change midline packing at bedside today  We reviewed all of the invasive devices/lines/telemetry orders   - None  DVT Prophylaxis:  - SCDs and Lovenox    Pain Assessment / Plan:  - Continue current analgesic regimen  Mobility Assessment / Plan:  - Activity as tolerated  Goals / Barriers for discharge:  - patient not yet ready for discharge at this time  - will continue to monitor abdominal exam as well as overall exam  - a m  Labs pending  - continue local wound care to midline  Case management following; case and discharge needs discussed  All questions and concerns were addressed  I spent greater than 9 minutes reviewing the plan with the patient and the nurse, and coordinating care for the day      Debora Garcia  9/30/2022

## 2022-09-30 NOTE — PLAN OF CARE
Problem: PAIN - ADULT  Goal: Verbalizes/displays adequate comfort level or baseline comfort level  Description: Interventions:  - Encourage patient to monitor pain and request assistance  - Assess pain using appropriate pain scale  - Administer analgesics based on type and severity of pain and evaluate response  - Implement non-pharmacological measures as appropriate and evaluate response  - Consider cultural and social influences on pain and pain management  - Notify physician/advanced practitioner if interventions unsuccessful or patient reports new pain  Outcome: Progressing     Problem: INFECTION - ADULT  Goal: Absence or prevention of progression during hospitalization  Description: INTERVENTIONS:  - Assess and monitor for signs and symptoms of infection  - Monitor lab/diagnostic results  - Monitor all insertion sites, i e  indwelling lines, tubes, and drains  - Monitor endotracheal if appropriate and nasal secretions for changes in amount and color  - [place] appropriate cooling/warming therapies per order  - Administer medications as ordered  - Instruct and encourage patient and family to use good hand hygiene technique  - Identify and instruct in appropriate isolation precautions for identified infection/condition  Outcome: Progressing

## 2022-10-01 ENCOUNTER — APPOINTMENT (INPATIENT)
Dept: RADIOLOGY | Facility: HOSPITAL | Age: 42
DRG: 405 | End: 2022-10-01
Payer: COMMERCIAL

## 2022-10-01 LAB
ALBUMIN SERPL BCP-MCNC: 2 G/DL (ref 3.5–5)
ALP SERPL-CCNC: 376 U/L (ref 46–116)
ALT SERPL W P-5'-P-CCNC: 19 U/L (ref 12–78)
ANION GAP SERPL CALCULATED.3IONS-SCNC: 6 MMOL/L (ref 4–13)
AST SERPL W P-5'-P-CCNC: 17 U/L (ref 5–45)
ATRIAL RATE: 99 BPM
BASOPHILS # BLD AUTO: 0.09 THOUSANDS/ÂΜL (ref 0–0.1)
BASOPHILS NFR BLD AUTO: 0 % (ref 0–1)
BILIRUB SERPL-MCNC: 0.5 MG/DL (ref 0.2–1)
BUN SERPL-MCNC: 6 MG/DL (ref 5–25)
CALCIUM ALBUM COR SERPL-MCNC: 9 MG/DL (ref 8.3–10.1)
CALCIUM SERPL-MCNC: 7.4 MG/DL (ref 8.3–10.1)
CARDIAC TROPONIN I PNL SERPL HS: 10 NG/L (ref 8–18)
CHLORIDE SERPL-SCNC: 95 MMOL/L (ref 96–108)
CK SERPL-CCNC: 128 U/L (ref 26–192)
CO2 SERPL-SCNC: 28 MMOL/L (ref 21–32)
CREAT SERPL-MCNC: 0.45 MG/DL (ref 0.6–1.3)
EOSINOPHIL # BLD AUTO: 0.35 THOUSAND/ÂΜL (ref 0–0.61)
EOSINOPHIL NFR BLD AUTO: 1 % (ref 0–6)
ERYTHROCYTE [DISTWIDTH] IN BLOOD BY AUTOMATED COUNT: 14.6 % (ref 11.6–15.1)
GFR SERPL CREATININE-BSD FRML MDRD: 123 ML/MIN/1.73SQ M
GLUCOSE SERPL-MCNC: 118 MG/DL (ref 65–140)
HCT VFR BLD AUTO: 26.8 % (ref 34.8–46.1)
HGB BLD-MCNC: 8.8 G/DL (ref 11.5–15.4)
IMM GRANULOCYTES # BLD AUTO: 0.44 THOUSAND/UL (ref 0–0.2)
IMM GRANULOCYTES NFR BLD AUTO: 1 % (ref 0–2)
LYMPHOCYTES # BLD AUTO: 1.15 THOUSANDS/ÂΜL (ref 0.6–4.47)
LYMPHOCYTES NFR BLD AUTO: 4 % (ref 14–44)
MCH RBC QN AUTO: 30.6 PG (ref 26.8–34.3)
MCHC RBC AUTO-ENTMCNC: 32.8 G/DL (ref 31.4–37.4)
MCV RBC AUTO: 93 FL (ref 82–98)
MONOCYTES # BLD AUTO: 3.05 THOUSAND/ÂΜL (ref 0.17–1.22)
MONOCYTES NFR BLD AUTO: 10 % (ref 4–12)
NEUTROPHILS # BLD AUTO: 25.69 THOUSANDS/ÂΜL (ref 1.85–7.62)
NEUTS SEG NFR BLD AUTO: 84 % (ref 43–75)
NRBC BLD AUTO-RTO: 0 /100 WBCS
P AXIS: 45 DEGREES
PLATELET # BLD AUTO: 572 THOUSANDS/UL (ref 149–390)
PMV BLD AUTO: 9.7 FL (ref 8.9–12.7)
POTASSIUM SERPL-SCNC: 3.4 MMOL/L (ref 3.5–5.3)
PR INTERVAL: 106 MS
PROT SERPL-MCNC: 5.5 G/DL (ref 6.4–8.4)
QRS AXIS: 11 DEGREES
QRSD INTERVAL: 86 MS
QT INTERVAL: 350 MS
QTC INTERVAL: 449 MS
RBC # BLD AUTO: 2.88 MILLION/UL (ref 3.81–5.12)
SODIUM SERPL-SCNC: 129 MMOL/L (ref 135–147)
T WAVE AXIS: 4 DEGREES
VENTRICULAR RATE: 99 BPM
WBC # BLD AUTO: 30.77 THOUSAND/UL (ref 4.31–10.16)

## 2022-10-01 PROCEDURE — 80053 COMPREHEN METABOLIC PANEL: CPT | Performed by: PHYSICIAN ASSISTANT

## 2022-10-01 PROCEDURE — 74177 CT ABD & PELVIS W/CONTRAST: CPT

## 2022-10-01 PROCEDURE — 93010 ELECTROCARDIOGRAM REPORT: CPT | Performed by: INTERNAL MEDICINE

## 2022-10-01 PROCEDURE — 84484 ASSAY OF TROPONIN QUANT: CPT

## 2022-10-01 PROCEDURE — 99024 POSTOP FOLLOW-UP VISIT: CPT | Performed by: SURGERY

## 2022-10-01 PROCEDURE — 82550 ASSAY OF CK (CPK): CPT

## 2022-10-01 PROCEDURE — 71275 CT ANGIOGRAPHY CHEST: CPT

## 2022-10-01 PROCEDURE — G1004 CDSM NDSC: HCPCS

## 2022-10-01 PROCEDURE — 93005 ELECTROCARDIOGRAM TRACING: CPT

## 2022-10-01 PROCEDURE — 85025 COMPLETE CBC W/AUTO DIFF WBC: CPT | Performed by: PHYSICIAN ASSISTANT

## 2022-10-01 RX ORDER — POTASSIUM CHLORIDE 14.9 MG/ML
20 INJECTION INTRAVENOUS 3 TIMES DAILY
Status: COMPLETED | OUTPATIENT
Start: 2022-10-01 | End: 2022-10-01

## 2022-10-01 RX ORDER — FUROSEMIDE 10 MG/ML
20 INJECTION INTRAMUSCULAR; INTRAVENOUS ONCE
Status: DISCONTINUED | OUTPATIENT
Start: 2022-10-01 | End: 2022-10-01

## 2022-10-01 RX ORDER — FUROSEMIDE 10 MG/ML
10 INJECTION INTRAMUSCULAR; INTRAVENOUS ONCE
Status: COMPLETED | OUTPATIENT
Start: 2022-10-01 | End: 2022-10-01

## 2022-10-01 RX ORDER — SODIUM CHLORIDE 9 MG/ML
75 INJECTION, SOLUTION INTRAVENOUS CONTINUOUS
Status: DISCONTINUED | OUTPATIENT
Start: 2022-10-01 | End: 2022-10-02

## 2022-10-01 RX ADMIN — VANCOMYCIN HYDROCHLORIDE 500 MG: 5 INJECTION, POWDER, LYOPHILIZED, FOR SOLUTION INTRAVENOUS at 00:33

## 2022-10-01 RX ADMIN — VANCOMYCIN HYDROCHLORIDE 500 MG: 5 INJECTION, POWDER, LYOPHILIZED, FOR SOLUTION INTRAVENOUS at 05:42

## 2022-10-01 RX ADMIN — DICYCLOMINE HYDROCHLORIDE 20 MG: 20 TABLET ORAL at 21:03

## 2022-10-01 RX ADMIN — METRONIDAZOLE 500 MG: 500 INJECTION, SOLUTION INTRAVENOUS at 15:38

## 2022-10-01 RX ADMIN — POTASSIUM CHLORIDE 20 MEQ: 14.9 INJECTION, SOLUTION INTRAVENOUS at 15:33

## 2022-10-01 RX ADMIN — GABAPENTIN 400 MG: 400 CAPSULE ORAL at 21:03

## 2022-10-01 RX ADMIN — METHOCARBAMOL TABLETS 1000 MG: 500 TABLET, COATED ORAL at 17:39

## 2022-10-01 RX ADMIN — GABAPENTIN 400 MG: 400 CAPSULE ORAL at 17:39

## 2022-10-01 RX ADMIN — IOHEXOL 100 ML: 350 INJECTION, SOLUTION INTRAVENOUS at 11:02

## 2022-10-01 RX ADMIN — METRONIDAZOLE 500 MG: 500 INJECTION, SOLUTION INTRAVENOUS at 00:33

## 2022-10-01 RX ADMIN — GABAPENTIN 400 MG: 400 CAPSULE ORAL at 12:00

## 2022-10-01 RX ADMIN — SODIUM CHLORIDE, SODIUM GLUCONATE, SODIUM ACETATE, POTASSIUM CHLORIDE, MAGNESIUM CHLORIDE, SODIUM PHOSPHATE, DIBASIC, AND POTASSIUM PHOSPHATE 125 ML/HR: .53; .5; .37; .037; .03; .012; .00082 INJECTION, SOLUTION INTRAVENOUS at 01:23

## 2022-10-01 RX ADMIN — VANCOMYCIN HYDROCHLORIDE 500 MG: 5 INJECTION, POWDER, LYOPHILIZED, FOR SOLUTION INTRAVENOUS at 12:01

## 2022-10-01 RX ADMIN — DICYCLOMINE HYDROCHLORIDE 20 MG: 20 TABLET ORAL at 05:42

## 2022-10-01 RX ADMIN — ENOXAPARIN SODIUM 40 MG: 40 INJECTION SUBCUTANEOUS at 08:10

## 2022-10-01 RX ADMIN — HYDROMORPHONE HYDROCHLORIDE 0.5 MG: 1 INJECTION, SOLUTION INTRAMUSCULAR; INTRAVENOUS; SUBCUTANEOUS at 23:17

## 2022-10-01 RX ADMIN — Medication 250 MG: at 17:39

## 2022-10-01 RX ADMIN — DICYCLOMINE HYDROCHLORIDE 20 MG: 20 TABLET ORAL at 12:00

## 2022-10-01 RX ADMIN — VANCOMYCIN HYDROCHLORIDE 500 MG: 5 INJECTION, POWDER, LYOPHILIZED, FOR SOLUTION INTRAVENOUS at 17:40

## 2022-10-01 RX ADMIN — GABAPENTIN 400 MG: 400 CAPSULE ORAL at 08:11

## 2022-10-01 RX ADMIN — METRONIDAZOLE 500 MG: 500 INJECTION, SOLUTION INTRAVENOUS at 08:11

## 2022-10-01 RX ADMIN — METHOCARBAMOL TABLETS 1000 MG: 500 TABLET, COATED ORAL at 05:41

## 2022-10-01 RX ADMIN — OXYCODONE HYDROCHLORIDE 10 MG: 10 TABLET ORAL at 06:34

## 2022-10-01 RX ADMIN — METHOCARBAMOL TABLETS 1000 MG: 500 TABLET, COATED ORAL at 00:33

## 2022-10-01 RX ADMIN — FUROSEMIDE 10 MG: 10 INJECTION, SOLUTION INTRAMUSCULAR; INTRAVENOUS at 12:46

## 2022-10-01 RX ADMIN — SODIUM CHLORIDE 125 ML/HR: 0.9 INJECTION, SOLUTION INTRAVENOUS at 10:13

## 2022-10-01 RX ADMIN — DICYCLOMINE HYDROCHLORIDE 20 MG: 20 TABLET ORAL at 15:31

## 2022-10-01 RX ADMIN — METHOCARBAMOL TABLETS 1000 MG: 500 TABLET, COATED ORAL at 12:00

## 2022-10-01 RX ADMIN — OXYCODONE HYDROCHLORIDE 10 MG: 10 TABLET ORAL at 21:15

## 2022-10-01 RX ADMIN — POTASSIUM CHLORIDE 20 MEQ: 14.9 INJECTION, SOLUTION INTRAVENOUS at 10:13

## 2022-10-01 RX ADMIN — POTASSIUM CHLORIDE 20 MEQ: 14.9 INJECTION, SOLUTION INTRAVENOUS at 21:03

## 2022-10-01 RX ADMIN — Medication 250 MG: at 08:10

## 2022-10-01 RX ADMIN — OXYCODONE HYDROCHLORIDE 10 MG: 10 TABLET ORAL at 12:40

## 2022-10-01 NOTE — PLAN OF CARE
Problem: PAIN - ADULT  Goal: Verbalizes/displays adequate comfort level or baseline comfort level  Description: Interventions:  - Encourage patient to monitor pain and request assistance  - Assess pain using appropriate pain scale  - Administer analgesics based on type and severity of pain and evaluate response  - Implement non-pharmacological measures as appropriate and evaluate response  - Consider cultural and social influences on pain and pain management  - Notify physician/advanced practitioner if interventions unsuccessful or patient reports new pain  Outcome: Progressing     Problem: INFECTION - ADULT  Goal: Absence or prevention of progression during hospitalization  Description: INTERVENTIONS:  - Assess and monitor for signs and symptoms of infection  - Monitor lab/diagnostic results  - Monitor all insertion sites, i e  indwelling lines, tubes, and drains  - Monitor endotracheal if appropriate and nasal secretions for changes in amount and color  - [place] appropriate cooling/warming therapies per order  - Administer medications as ordered  - Instruct and encourage patient and family to use good hand hygiene technique  - Identify and instruct in appropriate isolation precautions for identified infection/condition  Outcome: Progressing     Problem: INFECTION - ADULT  Goal: Absence of fever/infection during neutropenic period  Description: INTERVENTIONS:  - Monitor WBC    Outcome: Progressing

## 2022-10-01 NOTE — PLAN OF CARE
Problem: PAIN - ADULT  Goal: Verbalizes/displays adequate comfort level or baseline comfort level  Description: Interventions:  - Encourage patient to monitor pain and request assistance  - Assess pain using appropriate pain scale  - Administer analgesics based on type and severity of pain and evaluate response  - Implement non-pharmacological measures as appropriate and evaluate response  - Consider cultural and social influences on pain and pain management  - Notify physician/advanced practitioner if interventions unsuccessful or patient reports new pain  Outcome: Progressing     Problem: INFECTION - ADULT  Goal: Absence or prevention of progression during hospitalization  Description: INTERVENTIONS:  - Assess and monitor for signs and symptoms of infection  - Monitor lab/diagnostic results  - Monitor all insertion sites, i e  indwelling lines, tubes, and drains  - Monitor endotracheal if appropriate and nasal secretions for changes in amount and color  - [place] appropriate cooling/warming therapies per order  - Administer medications as ordered  - Instruct and encourage patient and family to use good hand hygiene technique  - Identify and instruct in appropriate isolation precautions for identified infection/condition  Outcome: Progressing

## 2022-10-01 NOTE — PROGRESS NOTES
Patient c/o abdominal distention, tightness, and mild SOB  2 L oxygen nasal cannula applied  PRN oxycodone given  Neisha Chaudhry MD with red surgery notified

## 2022-10-01 NOTE — PROGRESS NOTES
General Surgery  Progress Note   Raul Fuller 35 y o  female MRN: [id number]  Unit/Bed#: [facility] 822-01 Encounter: [id number]    Assessment:  35year old female with history of distal pancreatectomy and splenectomy on , now with C  diff infection   KUB: Nonspecific bowel gas pattern    Remains afebrile, however, tachycardic to 120  On room air  Patient reports feeling some chest pressure and shortness of breath this morning  Her O2 sats remain >92% on room air  She is on Lovenox for DVT ppx  Reports decreased frequency, but continued liquid bowel movements  Plan:   Diet as tolerated   Continue IV Flagyl/ PO Vanc  o ID following, appreciate recommendations  o If fails to improve, rec to consider GI consult for consideration of fecal microbiata transplant   Midline dressing change today per surgery   F/U EKG, AM labs   DVT ppx: Lovenox   Pain/ nausea control PRN   OOB/ ambulation   Incentive Spirometry    Subjective/Objective     Subjective: Patient seen and examined at bedside, in no acute distress  No acute events overnight  Having SOB and chest pressure this morning  O2 sats > 92% on room air  Reports having decreased number of bowel movements but is having continued liquid bowel movements  Objective:     Vitals:Blood pressure 99/66, pulse (!) 116, temperature 99 5 °F (37 5 °C), resp  rate 20, height 5' 4" (1 626 m), weight 53 6 kg (118 lb 2 7 oz), SpO2 92 %, not currently breastfeeding  ,Body mass index is 20 28 kg/m²       Temp (24hrs), Av 3 °F (37 4 °C), Min:99 °F (37 2 °C), Max:99 5 °F (37 5 °C)  Current: Temperature: 99 5 °F (37 5 °C)      Intake/Output Summary (Last 24 hours) at 10/1/2022 7897  Last data filed at 10/1/2022 0123  Gross per 24 hour   Intake 1000 ml   Output --   Net 1000 ml       Invasive Devices  Report    Peripheral Intravenous Line  Duration           Peripheral IV 22 Left Forearm 3 days    Peripheral IV 22 Right;Ventral (anterior) Forearm 3 days Drain  Duration           Closed/Suction Drain LUQ Bulb 19 Fr  19 days                Physical Exam:  General: No acute distress, alert and oriented  CV: Well perfused, regular rate and rhythm  Lungs: Normal work of breathing, no increased respiratory effort  Abdomen: Soft, non-tender, mildly distended   Midline with dressing in place, mild strike through  Extremities: No edema, clubbing or cyanosis  Skin: Warm, dry    Lab Results: Results: I have personally reviewed all pertinent laboratory/tests results  VTE Prophylaxis: Sequential compression device (Venodyne)  and Enoxaparin (Lovenox)    Gregrelyn Coil  10/1/2022

## 2022-10-02 LAB
ANION GAP SERPL CALCULATED.3IONS-SCNC: 4 MMOL/L (ref 4–13)
BASOPHILS # BLD AUTO: 0.07 THOUSANDS/ÂΜL (ref 0–0.1)
BASOPHILS NFR BLD AUTO: 0 % (ref 0–1)
BUN SERPL-MCNC: 7 MG/DL (ref 5–25)
CALCIUM SERPL-MCNC: 8.5 MG/DL (ref 8.3–10.1)
CHLORIDE SERPL-SCNC: 100 MMOL/L (ref 96–108)
CO2 SERPL-SCNC: 29 MMOL/L (ref 21–32)
CREAT SERPL-MCNC: 0.52 MG/DL (ref 0.6–1.3)
EOSINOPHIL # BLD AUTO: 1.28 THOUSAND/ÂΜL (ref 0–0.61)
EOSINOPHIL NFR BLD AUTO: 6 % (ref 0–6)
ERYTHROCYTE [DISTWIDTH] IN BLOOD BY AUTOMATED COUNT: 14.8 % (ref 11.6–15.1)
GFR SERPL CREATININE-BSD FRML MDRD: 117 ML/MIN/1.73SQ M
GLUCOSE SERPL-MCNC: 125 MG/DL (ref 65–140)
GLUCOSE SERPL-MCNC: 99 MG/DL (ref 65–140)
HCT VFR BLD AUTO: 33.4 % (ref 34.8–46.1)
HGB BLD-MCNC: 10.3 G/DL (ref 11.5–15.4)
IMM GRANULOCYTES # BLD AUTO: 0.2 THOUSAND/UL (ref 0–0.2)
IMM GRANULOCYTES NFR BLD AUTO: 1 % (ref 0–2)
LYMPHOCYTES # BLD AUTO: 2.58 THOUSANDS/ÂΜL (ref 0.6–4.47)
LYMPHOCYTES NFR BLD AUTO: 12 % (ref 14–44)
MAGNESIUM SERPL-MCNC: 2.2 MG/DL (ref 1.6–2.6)
MCH RBC QN AUTO: 29.5 PG (ref 26.8–34.3)
MCHC RBC AUTO-ENTMCNC: 30.8 G/DL (ref 31.4–37.4)
MCV RBC AUTO: 96 FL (ref 82–98)
MONOCYTES # BLD AUTO: 3.02 THOUSAND/ÂΜL (ref 0.17–1.22)
MONOCYTES NFR BLD AUTO: 14 % (ref 4–12)
NEUTROPHILS # BLD AUTO: 14.27 THOUSANDS/ÂΜL (ref 1.85–7.62)
NEUTS SEG NFR BLD AUTO: 67 % (ref 43–75)
NRBC BLD AUTO-RTO: 0 /100 WBCS
PHOSPHATE SERPL-MCNC: 1.7 MG/DL (ref 2.7–4.5)
PLATELET # BLD AUTO: 597 THOUSANDS/UL (ref 149–390)
PMV BLD AUTO: 10.6 FL (ref 8.9–12.7)
POTASSIUM SERPL-SCNC: 3.9 MMOL/L (ref 3.5–5.3)
RBC # BLD AUTO: 3.49 MILLION/UL (ref 3.81–5.12)
SODIUM SERPL-SCNC: 133 MMOL/L (ref 135–147)
WBC # BLD AUTO: 21.42 THOUSAND/UL (ref 4.31–10.16)

## 2022-10-02 PROCEDURE — 84100 ASSAY OF PHOSPHORUS: CPT

## 2022-10-02 PROCEDURE — 83735 ASSAY OF MAGNESIUM: CPT

## 2022-10-02 PROCEDURE — 99024 POSTOP FOLLOW-UP VISIT: CPT | Performed by: SURGERY

## 2022-10-02 PROCEDURE — 82948 REAGENT STRIP/BLOOD GLUCOSE: CPT

## 2022-10-02 PROCEDURE — 80048 BASIC METABOLIC PNL TOTAL CA: CPT

## 2022-10-02 PROCEDURE — 85025 COMPLETE CBC W/AUTO DIFF WBC: CPT

## 2022-10-02 RX ORDER — BUMETANIDE 0.25 MG/ML
1 INJECTION, SOLUTION INTRAMUSCULAR; INTRAVENOUS ONCE
Status: DISCONTINUED | OUTPATIENT
Start: 2022-10-02 | End: 2022-10-03

## 2022-10-02 RX ORDER — FUROSEMIDE 10 MG/ML
20 INJECTION INTRAMUSCULAR; INTRAVENOUS ONCE
Status: COMPLETED | OUTPATIENT
Start: 2022-10-02 | End: 2022-10-02

## 2022-10-02 RX ADMIN — METHOCARBAMOL TABLETS 1000 MG: 500 TABLET, COATED ORAL at 06:06

## 2022-10-02 RX ADMIN — OXYCODONE HYDROCHLORIDE 10 MG: 10 TABLET ORAL at 10:30

## 2022-10-02 RX ADMIN — GABAPENTIN 400 MG: 400 CAPSULE ORAL at 11:50

## 2022-10-02 RX ADMIN — METHOCARBAMOL TABLETS 1000 MG: 500 TABLET, COATED ORAL at 11:50

## 2022-10-02 RX ADMIN — OXYCODONE HYDROCHLORIDE 10 MG: 10 TABLET ORAL at 22:19

## 2022-10-02 RX ADMIN — SODIUM CHLORIDE 75 ML/HR: 0.9 INJECTION, SOLUTION INTRAVENOUS at 04:45

## 2022-10-02 RX ADMIN — VANCOMYCIN HYDROCHLORIDE 500 MG: 5 INJECTION, POWDER, LYOPHILIZED, FOR SOLUTION INTRAVENOUS at 00:12

## 2022-10-02 RX ADMIN — Medication 250 MG: at 17:14

## 2022-10-02 RX ADMIN — DICYCLOMINE HYDROCHLORIDE 20 MG: 20 TABLET ORAL at 17:16

## 2022-10-02 RX ADMIN — METHOCARBAMOL TABLETS 1000 MG: 500 TABLET, COATED ORAL at 17:13

## 2022-10-02 RX ADMIN — ENOXAPARIN SODIUM 40 MG: 40 INJECTION SUBCUTANEOUS at 08:03

## 2022-10-02 RX ADMIN — GABAPENTIN 400 MG: 400 CAPSULE ORAL at 22:14

## 2022-10-02 RX ADMIN — DICYCLOMINE HYDROCHLORIDE 20 MG: 20 TABLET ORAL at 22:14

## 2022-10-02 RX ADMIN — METHOCARBAMOL TABLETS 1000 MG: 500 TABLET, COATED ORAL at 22:14

## 2022-10-02 RX ADMIN — VANCOMYCIN HYDROCHLORIDE 500 MG: 5 INJECTION, POWDER, LYOPHILIZED, FOR SOLUTION INTRAVENOUS at 06:06

## 2022-10-02 RX ADMIN — SODIUM PHOSPHATE, MONOBASIC, MONOHYDRATE 21 MMOL: 276; 142 INJECTION, SOLUTION INTRAVENOUS at 09:01

## 2022-10-02 RX ADMIN — GABAPENTIN 400 MG: 400 CAPSULE ORAL at 08:03

## 2022-10-02 RX ADMIN — FUROSEMIDE 20 MG: 10 INJECTION, SOLUTION INTRAMUSCULAR; INTRAVENOUS at 09:01

## 2022-10-02 RX ADMIN — METHOCARBAMOL TABLETS 1000 MG: 500 TABLET, COATED ORAL at 00:12

## 2022-10-02 RX ADMIN — VANCOMYCIN HYDROCHLORIDE 500 MG: 5 INJECTION, POWDER, LYOPHILIZED, FOR SOLUTION INTRAVENOUS at 22:14

## 2022-10-02 RX ADMIN — METRONIDAZOLE 500 MG: 500 INJECTION, SOLUTION INTRAVENOUS at 08:03

## 2022-10-02 RX ADMIN — METRONIDAZOLE 500 MG: 500 INJECTION, SOLUTION INTRAVENOUS at 15:12

## 2022-10-02 RX ADMIN — DICYCLOMINE HYDROCHLORIDE 20 MG: 20 TABLET ORAL at 06:06

## 2022-10-02 RX ADMIN — METRONIDAZOLE 500 MG: 500 INJECTION, SOLUTION INTRAVENOUS at 22:14

## 2022-10-02 RX ADMIN — METRONIDAZOLE 500 MG: 500 INJECTION, SOLUTION INTRAVENOUS at 00:13

## 2022-10-02 RX ADMIN — DICYCLOMINE HYDROCHLORIDE 20 MG: 20 TABLET ORAL at 11:50

## 2022-10-02 RX ADMIN — VANCOMYCIN HYDROCHLORIDE 500 MG: 5 INJECTION, POWDER, LYOPHILIZED, FOR SOLUTION INTRAVENOUS at 17:13

## 2022-10-02 RX ADMIN — VANCOMYCIN HYDROCHLORIDE 500 MG: 5 INJECTION, POWDER, LYOPHILIZED, FOR SOLUTION INTRAVENOUS at 11:50

## 2022-10-02 RX ADMIN — Medication 250 MG: at 08:03

## 2022-10-02 RX ADMIN — GABAPENTIN 400 MG: 400 CAPSULE ORAL at 17:13

## 2022-10-02 NOTE — PROGRESS NOTES
General Surgery  Progress Note   Kathya Agrawal 35 y o  female MRN: [id number]  Unit/Bed#: [facility] 822-01 Encounter: [id number]    Assessment:  35year old female with history of distal pancreatectomy and splenectomy on , complicated by C  diff infection  New onset shortness of breath, chest pressure, diffuse swelling on 10/01  CTA negative for PE but notable for small bilateral pleural effusions, anasarca, ascites status post 1 dose of 10 mg of IV Lasix  Vital signs stable, afebrile  Initially on 2 L NC, decreased to 1L while in room and patient maintaining O2 sat at 98%  LUQ MAIN: 20 cc serous    WBC: 21 42 (30 77)  Hgb: 10 3 (8 8)  Cr: 0 52 (0 45)    Plan:   Diet as tolerated   Continue IV Flagyl/p o  vanc for C  Diff  o ID following, appreciate recommendations   Midline dressing change done at bedside this AM   Consider another IV dose of Lasix given continued chest tightness/ lower extremity edema given minimal response to dose yesterday    DVT ppx:  Lovenox   Pain/ nausea control PRN   OOB/ ambulation   Incentive Spirometry/pulmonary toilet    Subjective/Objective     Subjective: Patient seen and examined at bedside, in no acute distress  No acute events overnight  Patient's pain is well controlled  She reports occasional nausea, otherwise no vomiting  Continues to have intermittent chest tightness, did not have significantly increased UOP with 10 mg Lasix  Objective:     Vitals:Blood pressure 99/66, pulse 93, temperature 99 °F (37 2 °C), resp  rate 18, height 5' 4" (1 626 m), weight 53 6 kg (118 lb 2 7 oz), SpO2 99 %, not currently breastfeeding  ,Body mass index is 20 28 kg/m²       Temp (24hrs), Av 7 °F (37 1 °C), Min:98 4 °F (36 9 °C), Max:99 °F (37 2 °C)  Current: Temperature: 99 °F (37 2 °C)      Intake/Output Summary (Last 24 hours) at 10/1/2022 2319  Last data filed at 10/1/2022 1901  Gross per 24 hour   Intake 1420 ml   Output 20 ml   Net 1400 ml       Invasive Devices Report    Peripheral Intravenous Line  Duration           Peripheral IV 10/01/22 Right;Ventral (anterior) Forearm <1 day          Drain  Duration           Closed/Suction Drain LUQ Bulb 19 Fr  20 days                Physical Exam:  General: No acute distress, alert and oriented  CV: Well perfused, regular rate and rhythm  Lungs: Normal work of breathing, no increased respiratory effort, on 1L NC  Abdomen: Soft, non-tender, mildly distended  Midline incision with dressing in place, mild strike through on ABD   LUQ with serous output  Extremities: Mild b/l LE edema  Skin: Warm, dry    Lab Results:   BMP/CMP:   Lab Results   Component Value Date    SODIUM 133 (L) 10/02/2022    K 3 9 10/02/2022     10/02/2022    CO2 29 10/02/2022    BUN 7 10/02/2022    CREATININE 0 52 (L) 10/02/2022    CALCIUM 8 5 10/02/2022    EGFR 117 10/02/2022    and CBC:   Lab Results   Component Value Date    WBC 21 42 (H) 10/02/2022    HGB 10 3 (L) 10/02/2022    HCT 33 4 (L) 10/02/2022    MCV 96 10/02/2022     (H) 10/02/2022    MCH 29 5 10/02/2022    MCHC 30 8 (L) 10/02/2022    RDW 14 8 10/02/2022    MPV 10 6 10/02/2022    NRBC 0 10/02/2022     VTE Prophylaxis: Sequential compression device (Venodyne)  and Enoxaparin (Lovenox)    Zac Mancuso  10/1/2022

## 2022-10-03 LAB
ANION GAP SERPL CALCULATED.3IONS-SCNC: 5 MMOL/L (ref 4–13)
BACTERIA BLD CULT: NORMAL
BACTERIA BLD CULT: NORMAL
BASOPHILS # BLD AUTO: 0.05 THOUSANDS/ÂΜL (ref 0–0.1)
BASOPHILS NFR BLD AUTO: 0 % (ref 0–1)
BUN SERPL-MCNC: 4 MG/DL (ref 5–25)
CALCIUM SERPL-MCNC: 8.1 MG/DL (ref 8.3–10.1)
CHLORIDE SERPL-SCNC: 104 MMOL/L (ref 96–108)
CO2 SERPL-SCNC: 29 MMOL/L (ref 21–32)
CREAT SERPL-MCNC: 0.45 MG/DL (ref 0.6–1.3)
EOSINOPHIL # BLD AUTO: 1.18 THOUSAND/ÂΜL (ref 0–0.61)
EOSINOPHIL NFR BLD AUTO: 10 % (ref 0–6)
ERYTHROCYTE [DISTWIDTH] IN BLOOD BY AUTOMATED COUNT: 14.6 % (ref 11.6–15.1)
GFR SERPL CREATININE-BSD FRML MDRD: 123 ML/MIN/1.73SQ M
GLUCOSE SERPL-MCNC: 103 MG/DL (ref 65–140)
HCT VFR BLD AUTO: 27.8 % (ref 34.8–46.1)
HGB BLD-MCNC: 9 G/DL (ref 11.5–15.4)
IMM GRANULOCYTES # BLD AUTO: 0.13 THOUSAND/UL (ref 0–0.2)
IMM GRANULOCYTES NFR BLD AUTO: 1 % (ref 0–2)
LYMPHOCYTES # BLD AUTO: 1.89 THOUSANDS/ÂΜL (ref 0.6–4.47)
LYMPHOCYTES NFR BLD AUTO: 15 % (ref 14–44)
MCH RBC QN AUTO: 30.4 PG (ref 26.8–34.3)
MCHC RBC AUTO-ENTMCNC: 32.4 G/DL (ref 31.4–37.4)
MCV RBC AUTO: 94 FL (ref 82–98)
MONOCYTES # BLD AUTO: 1.79 THOUSAND/ÂΜL (ref 0.17–1.22)
MONOCYTES NFR BLD AUTO: 15 % (ref 4–12)
NEUTROPHILS # BLD AUTO: 7.3 THOUSANDS/ÂΜL (ref 1.85–7.62)
NEUTS SEG NFR BLD AUTO: 59 % (ref 43–75)
NRBC BLD AUTO-RTO: 0 /100 WBCS
PHOSPHATE SERPL-MCNC: 3 MG/DL (ref 2.7–4.5)
PLATELET # BLD AUTO: 731 THOUSANDS/UL (ref 149–390)
PMV BLD AUTO: 9.4 FL (ref 8.9–12.7)
POTASSIUM SERPL-SCNC: 3.8 MMOL/L (ref 3.5–5.3)
RBC # BLD AUTO: 2.96 MILLION/UL (ref 3.81–5.12)
SODIUM SERPL-SCNC: 138 MMOL/L (ref 135–147)
WBC # BLD AUTO: 12.34 THOUSAND/UL (ref 4.31–10.16)

## 2022-10-03 PROCEDURE — 99024 POSTOP FOLLOW-UP VISIT: CPT | Performed by: SURGERY

## 2022-10-03 PROCEDURE — 80048 BASIC METABOLIC PNL TOTAL CA: CPT

## 2022-10-03 PROCEDURE — 99232 SBSQ HOSP IP/OBS MODERATE 35: CPT | Performed by: INTERNAL MEDICINE

## 2022-10-03 PROCEDURE — 85025 COMPLETE CBC W/AUTO DIFF WBC: CPT

## 2022-10-03 PROCEDURE — 84100 ASSAY OF PHOSPHORUS: CPT | Performed by: SURGERY

## 2022-10-03 RX ORDER — CHOLESTYRAMINE LIGHT 4 G/5.7G
4 POWDER, FOR SUSPENSION ORAL 2 TIMES DAILY
Status: DISCONTINUED | OUTPATIENT
Start: 2022-10-03 | End: 2022-10-04 | Stop reason: HOSPADM

## 2022-10-03 RX ORDER — ACETAMINOPHEN 325 MG/1
975 TABLET ORAL EVERY 6 HOURS PRN
Status: DISCONTINUED | OUTPATIENT
Start: 2022-10-03 | End: 2022-10-04 | Stop reason: HOSPADM

## 2022-10-03 RX ORDER — BUMETANIDE 0.25 MG/ML
1 INJECTION, SOLUTION INTRAMUSCULAR; INTRAVENOUS ONCE
Status: COMPLETED | OUTPATIENT
Start: 2022-10-03 | End: 2022-10-03

## 2022-10-03 RX ADMIN — METHOCARBAMOL TABLETS 1000 MG: 500 TABLET, COATED ORAL at 17:02

## 2022-10-03 RX ADMIN — GABAPENTIN 400 MG: 400 CAPSULE ORAL at 21:01

## 2022-10-03 RX ADMIN — METHOCARBAMOL TABLETS 1000 MG: 500 TABLET, COATED ORAL at 05:37

## 2022-10-03 RX ADMIN — METHOCARBAMOL TABLETS 1000 MG: 500 TABLET, COATED ORAL at 11:49

## 2022-10-03 RX ADMIN — METRONIDAZOLE 500 MG: 500 INJECTION, SOLUTION INTRAVENOUS at 08:53

## 2022-10-03 RX ADMIN — GABAPENTIN 400 MG: 400 CAPSULE ORAL at 17:02

## 2022-10-03 RX ADMIN — GABAPENTIN 400 MG: 400 CAPSULE ORAL at 11:50

## 2022-10-03 RX ADMIN — ENOXAPARIN SODIUM 40 MG: 40 INJECTION SUBCUTANEOUS at 08:53

## 2022-10-03 RX ADMIN — DICYCLOMINE HYDROCHLORIDE 20 MG: 20 TABLET ORAL at 17:02

## 2022-10-03 RX ADMIN — DICYCLOMINE HYDROCHLORIDE 20 MG: 20 TABLET ORAL at 21:01

## 2022-10-03 RX ADMIN — DICYCLOMINE HYDROCHLORIDE 20 MG: 20 TABLET ORAL at 05:37

## 2022-10-03 RX ADMIN — VANCOMYCIN HYDROCHLORIDE 500 MG: 5 INJECTION, POWDER, LYOPHILIZED, FOR SOLUTION INTRAVENOUS at 17:07

## 2022-10-03 RX ADMIN — Medication 250 MG: at 17:02

## 2022-10-03 RX ADMIN — BUMETANIDE 1 MG: 0.25 INJECTION INTRAMUSCULAR; INTRAVENOUS at 13:46

## 2022-10-03 RX ADMIN — CHOLESTYRAMINE 4 G: 4 POWDER, FOR SUSPENSION ORAL at 16:56

## 2022-10-03 RX ADMIN — ACETAMINOPHEN 975 MG: 325 TABLET, FILM COATED ORAL at 10:08

## 2022-10-03 RX ADMIN — VANCOMYCIN HYDROCHLORIDE 500 MG: 5 INJECTION, POWDER, LYOPHILIZED, FOR SOLUTION INTRAVENOUS at 11:50

## 2022-10-03 RX ADMIN — GABAPENTIN 400 MG: 400 CAPSULE ORAL at 08:53

## 2022-10-03 RX ADMIN — DICYCLOMINE HYDROCHLORIDE 20 MG: 20 TABLET ORAL at 11:50

## 2022-10-03 RX ADMIN — OXYCODONE HYDROCHLORIDE 5 MG: 5 TABLET ORAL at 05:53

## 2022-10-03 RX ADMIN — VANCOMYCIN HYDROCHLORIDE 500 MG: 5 INJECTION, POWDER, LYOPHILIZED, FOR SOLUTION INTRAVENOUS at 05:50

## 2022-10-03 RX ADMIN — Medication 250 MG: at 08:53

## 2022-10-03 NOTE — PLAN OF CARE
Problem: PAIN - ADULT  Goal: Verbalizes/displays adequate comfort level or baseline comfort level  Description: Interventions:  - Encourage patient to monitor pain and request assistance  - Assess pain using appropriate pain scale  - Administer analgesics based on type and severity of pain and evaluate response  - Implement non-pharmacological measures as appropriate and evaluate response  - Consider cultural and social influences on pain and pain management  - Notify physician/advanced practitioner if interventions unsuccessful or patient reports new pain  Outcome: Progressing     Problem: Knowledge Deficit  Goal: Patient/family/caregiver demonstrates understanding of disease process, treatment plan, medications, and discharge instructions  Description: Complete learning assessment and assess knowledge base    Interventions:  - Provide teaching at level of understanding  - Provide teaching via preferred learning methods  Outcome: Progressing     Problem: Potential for Falls  Goal: Patient will remain free of falls  Description: INTERVENTIONS:  - Educate patient/family on patient safety including physical limitations  - Instruct patient to call for assistance with activity   - Consult OT/PT to assist with strengthening/mobility   - Keep Call bell within reach  - Keep bed low and locked with side rails adjusted as appropriate  - Keep care items and personal belongings within reach  - Initiate and maintain comfort rounds  - Make Fall Risk Sign visible to staff  - Offer Toileting every 2 Hours, in advance of need  - Initiate/Maintain alarm  - Obtain necessary fall risk management equipment  - Apply yellow socks and bracelet for high fall risk patients  - Consider moving patient to room near nurses station  Outcome: Progressing

## 2022-10-03 NOTE — PROGRESS NOTES
General Surgery  Progress Note   Lucretia Blount 35 y o  female MRN: [id number]  Unit/Bed#: [facility] 822-01 Encounter: [id number]    Assessment:  35year old female with history of distal pancreatectomy and splenectomy on , complicated by C  diff infection  New onset shortness of breath, chest pressure, diffuse swelling on 10/01  CTA negative for PE but notable for small bilateral pleural effusions, anasarca, ascites s/p 1 dose of 10 mg of IV Lasix on 10/1 and 20 mg IV Lasix on 10/2  Vital signs stable, afebrile  On room air this morning satting 95%, feels intermittent chest tightness without shortness of breath  Continues to have significant loose bowel movements, reports 7 in the last 24 hours  LUQ MAIN: 20 cc serous    Plan:   Diet as tolerated   Continue IV Flagyl/p o  vanc for C  Diff  o ID following, appreciate recommendations  o Will discuss plan with them today given contined significant bowel movements   Midline dressing change today   DVT ppx:  Lovenox   Pain/ nausea control PRN   OOB/ ambulation   Incentive Spirometry/pulmonary toilet    Subjective/Objective     Subjective: Patient seen and examined at bedside, in no acute distress  No acute events overnight  Patient's pain is well controlled  Continues to have intermittent chest tightness  Still feels swollen  States her weight is generally 120 lbs and she weight 130 lbs this AM     Objective:     Vitals:Blood pressure 95/58, pulse 92, temperature 98 2 °F (36 8 °C), resp  rate 17, height 5' 4" (1 626 m), weight 59 8 kg (131 lb 13 4 oz), SpO2 97 %, not currently breastfeeding  ,Body mass index is 22 63 kg/m²       Temp (24hrs), Av 2 °F (36 8 °C), Min:97 9 °F (36 6 °C), Max:98 6 °F (37 °C)  Current: Temperature: 98 2 °F (36 8 °C)      Intake/Output Summary (Last 24 hours) at 10/3/2022 0626  Last data filed at 10/3/2022 0601  Gross per 24 hour   Intake 240 ml   Output 20 ml   Net 220 ml       Invasive Devices  Report    Peripheral Intravenous Line  Duration           Peripheral IV 10/01/22 Right;Ventral (anterior) Forearm 1 day          Drain  Duration           Closed/Suction Drain LUQ Bulb 19 Fr  21 days                Physical Exam:  General: No acute distress, alert and oriented  CV: Well perfused, regular rate and rhythm  Lungs: Normal work of breathing, no increased respiratory effort, on 1L NC  Abdomen: Soft, non-tender, mildly distended  Midline incision with dressing in place, mild strike through on ABD   LUQ with serous output  Extremities: no notable edema, no cyanosis  Skin: Warm, dry    Lab Results:   BMP/CMP:   No results found for: SODIUM, K, CL, CO2, ANIONGAP, BUN, CREATININE, GLUCOSE, CALCIUM, AST, ALT, ALKPHOS, PROT, BILITOT, EGFR and CBC:   No results found for: WBC, HGB, HCT, MCV, PLT, ADJUSTEDWBC, MCH, MCHC, RDW, MPV, NRBC  VTE Prophylaxis: Sequential compression device (Venodyne)  and Enoxaparin (Lovenox)    Adilene [facility]  10/3/2022

## 2022-10-03 NOTE — RESTORATIVE TECHNICIAN NOTE
Restorative Technician Note      Patient Name: Jacinda Maciel     Restorative Tech Visit Date: 10/3/2022  Note Type: Mobility (Provided pt with education on mobility services and the benefits; pt asking to ambulate in the hallways as walking in the room is not enough   Made RN aware and RN will follow up with pt )    ,Lucía Schmidt  DPT, Restorative Technician
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
## 2022-10-04 ENCOUNTER — TRANSITIONAL CARE MANAGEMENT (OUTPATIENT)
Dept: FAMILY MEDICINE CLINIC | Facility: CLINIC | Age: 42
End: 2022-10-04

## 2022-10-04 VITALS
WEIGHT: 132.72 LBS | SYSTOLIC BLOOD PRESSURE: 92 MMHG | BODY MASS INDEX: 22.66 KG/M2 | OXYGEN SATURATION: 93 % | HEIGHT: 64 IN | DIASTOLIC BLOOD PRESSURE: 59 MMHG | HEART RATE: 94 BPM | TEMPERATURE: 98.4 F | RESPIRATION RATE: 17 BRPM

## 2022-10-04 LAB
ERYTHROCYTE [DISTWIDTH] IN BLOOD BY AUTOMATED COUNT: 14.7 % (ref 11.6–15.1)
HCT VFR BLD AUTO: 29.9 % (ref 34.8–46.1)
HGB BLD-MCNC: 9.5 G/DL (ref 11.5–15.4)
MCH RBC QN AUTO: 29.9 PG (ref 26.8–34.3)
MCHC RBC AUTO-ENTMCNC: 31.8 G/DL (ref 31.4–37.4)
MCV RBC AUTO: 94 FL (ref 82–98)
PLATELET # BLD AUTO: 778 THOUSANDS/UL (ref 149–390)
PMV BLD AUTO: 9.5 FL (ref 8.9–12.7)
RBC # BLD AUTO: 3.18 MILLION/UL (ref 3.81–5.12)
WBC # BLD AUTO: 11.71 THOUSAND/UL (ref 4.31–10.16)

## 2022-10-04 PROCEDURE — 99024 POSTOP FOLLOW-UP VISIT: CPT | Performed by: SURGERY

## 2022-10-04 PROCEDURE — 85027 COMPLETE CBC AUTOMATED: CPT | Performed by: INTERNAL MEDICINE

## 2022-10-04 PROCEDURE — 99232 SBSQ HOSP IP/OBS MODERATE 35: CPT | Performed by: INTERNAL MEDICINE

## 2022-10-04 PROCEDURE — NC001 PR NO CHARGE: Performed by: PHYSICIAN ASSISTANT

## 2022-10-04 RX ORDER — CHOLESTYRAMINE LIGHT 4 G/5.7G
4 POWDER, FOR SUSPENSION ORAL 2 TIMES DAILY
Qty: 30 PACKET | Refills: 0 | Status: SHIPPED | OUTPATIENT
Start: 2022-10-04 | End: 2022-10-27

## 2022-10-04 RX ORDER — OXYCODONE HYDROCHLORIDE 5 MG/1
5 TABLET ORAL EVERY 4 HOURS PRN
Qty: 30 TABLET | Refills: 0 | Status: SHIPPED | OUTPATIENT
Start: 2022-10-04 | End: 2022-10-14

## 2022-10-04 RX ORDER — ACETAMINOPHEN 325 MG/1
975 TABLET ORAL EVERY 6 HOURS PRN
Refills: 0
Start: 2022-10-04

## 2022-10-04 RX ORDER — DICYCLOMINE HCL 20 MG
20 TABLET ORAL
Qty: 60 TABLET | Refills: 0 | Status: SHIPPED | OUTPATIENT
Start: 2022-10-04 | End: 2022-10-27

## 2022-10-04 RX ADMIN — CHOLESTYRAMINE 4 G: 4 POWDER, FOR SUSPENSION ORAL at 08:26

## 2022-10-04 RX ADMIN — DICYCLOMINE HYDROCHLORIDE 20 MG: 20 TABLET ORAL at 05:36

## 2022-10-04 RX ADMIN — GABAPENTIN 400 MG: 400 CAPSULE ORAL at 10:56

## 2022-10-04 RX ADMIN — METHOCARBAMOL TABLETS 1000 MG: 500 TABLET, COATED ORAL at 10:56

## 2022-10-04 RX ADMIN — VANCOMYCIN HYDROCHLORIDE 500 MG: 5 INJECTION, POWDER, LYOPHILIZED, FOR SOLUTION INTRAVENOUS at 10:56

## 2022-10-04 RX ADMIN — Medication 250 MG: at 08:26

## 2022-10-04 RX ADMIN — METHOCARBAMOL TABLETS 1000 MG: 500 TABLET, COATED ORAL at 05:36

## 2022-10-04 RX ADMIN — VANCOMYCIN HYDROCHLORIDE 500 MG: 5 INJECTION, POWDER, LYOPHILIZED, FOR SOLUTION INTRAVENOUS at 05:36

## 2022-10-04 RX ADMIN — VANCOMYCIN HYDROCHLORIDE 500 MG: 5 INJECTION, POWDER, LYOPHILIZED, FOR SOLUTION INTRAVENOUS at 00:09

## 2022-10-04 RX ADMIN — DICYCLOMINE HYDROCHLORIDE 20 MG: 20 TABLET ORAL at 10:56

## 2022-10-04 RX ADMIN — METHOCARBAMOL TABLETS 1000 MG: 500 TABLET, COATED ORAL at 00:09

## 2022-10-04 RX ADMIN — GABAPENTIN 400 MG: 400 CAPSULE ORAL at 08:26

## 2022-10-04 RX ADMIN — ENOXAPARIN SODIUM 40 MG: 40 INJECTION SUBCUTANEOUS at 08:26
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
## 2022-10-04 NOTE — UTILIZATION REVIEW
Notification of Discharge   This is a Notification of Discharge from our facility [address]  Please be advised that this patient has been discharge from our facility  Below you will find the admission and discharge date and time including the patients disposition  UTILIZATION REVIEW CONTACT:  Carolina Mahan  Utilization   Network Utilization Review Department  Phone: [phone] x carefully listen to the prompts  All voicemails are confidential   Email: [email or web address]     PHYSICIAN ADVISORY SERVICES:  FOR [id number] REVIEW - MEDICAL NECESSITY DENIAL  Phone: [phone]  Fax: [phone]  Email: [email or web address]     PRESENTATION DATE: 9/27/2022  9:46 PM  OBERVATION ADMISSION DATE:   INPATIENT ADMISSION DATE: 9/28/22  4:40 AM   DISCHARGE DATE: 10/4/2022 12:45 PM  DISPOSITION: Home/Self Care Home/Self Care      IMPORTANT INFORMATION:  Send all requests for admission clinical reviews, approved or denied determinations and any other requests to dedicated fax number below belonging to the campus where the patient is receiving treatment   List of dedicated fax numbers:  [address] DENIALS (Administrative/Medical Necessity) [phone]   [address] (Maternity/NICU/Pediatrics) [phone]   [facility] [place] [phone]   [address] [phone]   [address] [place] [address] [phone]   2000 [facility] [address] [place] [address],[address] [phone]   [facility]  [phone]   [address] [place] [address], [facility]  [address] [facility] 1000 [facility] [phone]

## 2022-10-04 NOTE — DISCHARGE INSTRUCTIONS
Continue vancomycin as directed  Continue with high fiber diet to bulk stool  Call for worsening diarrhea, fevers, chills, worsening abdominal pain and bloating

## 2022-10-04 NOTE — PLAN OF CARE
Problem: PAIN - ADULT  Goal: Verbalizes/displays adequate comfort level or baseline comfort level  Description: Interventions:  - Encourage patient to monitor pain and request assistance  - Assess pain using appropriate pain scale  - Administer analgesics based on type and severity of pain and evaluate response  - Implement non-pharmacological measures as appropriate and evaluate response  - Consider cultural and social influences on pain and pain management  - Notify physician/advanced practitioner if interventions unsuccessful or patient reports new pain  Outcome: Progressing     Problem: INFECTION - ADULT  Goal: Absence or prevention of progression during hospitalization  Description: INTERVENTIONS:  - Assess and monitor for signs and symptoms of infection  - Monitor lab/diagnostic results  - Monitor all insertion sites, i e  indwelling lines, tubes, and drains  - Monitor endotracheal if appropriate and nasal secretions for changes in amount and color  - [place] appropriate cooling/warming therapies per order  - Administer medications as ordered  - Instruct and encourage patient and family to use good hand hygiene technique  - Identify and instruct in appropriate isolation precautions for identified infection/condition  Outcome: Progressing  Goal: Absence of fever/infection during neutropenic period  Description: INTERVENTIONS:  - Monitor WBC    Outcome: Progressing     Problem: DISCHARGE PLANNING  Goal: Discharge to home or other facility with appropriate resources  Description: INTERVENTIONS:  - Identify barriers to discharge w/patient and caregiver  - Arrange for needed discharge resources and transportation as appropriate  - Identify discharge learning needs (meds, wound care, etc )  - Arrange for interpretive services to assist at discharge as needed  - Refer to Case Management Department for coordinating discharge planning if the patient needs post-hospital services based on physician/advanced practitioner order or complex needs related to functional status, cognitive ability, or social support system  Outcome: Progressing     Problem: Knowledge Deficit  Goal: Patient/family/caregiver demonstrates understanding of disease process, treatment plan, medications, and discharge instructions  Description: Complete learning assessment and assess knowledge base    Interventions:  - Provide teaching at level of understanding  - Provide teaching via preferred learning methods  Outcome: Progressing

## 2022-10-04 NOTE — PLAN OF CARE
Problem: PAIN - ADULT  Goal: Verbalizes/displays adequate comfort level or baseline comfort level  Description: Interventions:  - Encourage patient to monitor pain and request assistance  - Assess pain using appropriate pain scale  - Administer analgesics based on type and severity of pain and evaluate response  - Implement non-pharmacological measures as appropriate and evaluate response  - Consider cultural and social influences on pain and pain management  - Notify physician/advanced practitioner if interventions unsuccessful or patient reports new pain  Outcome: Progressing     Problem: INFECTION - ADULT  Goal: Absence or prevention of progression during hospitalization  Description: INTERVENTIONS:  - Assess and monitor for signs and symptoms of infection  - Monitor lab/diagnostic results  - Monitor all insertion sites, i e  indwelling lines, tubes, and drains  - Monitor endotracheal if appropriate and nasal secretions for changes in amount and color  - [place] appropriate cooling/warming therapies per order  - Administer medications as ordered  - Instruct and encourage patient and family to use good hand hygiene technique  - Identify and instruct in appropriate isolation precautions for identified infection/condition  Outcome: Progressing     Problem: DISCHARGE PLANNING  Goal: Discharge to home or other facility with appropriate resources  Description: INTERVENTIONS:  - Identify barriers to discharge w/patient and caregiver  - Arrange for needed discharge resources and transportation as appropriate  - Identify discharge learning needs (meds, wound care, etc )  - Arrange for interpretive services to assist at discharge as needed  - Refer to Case Management Department for coordinating discharge planning if the patient needs post-hospital services based on physician/advanced practitioner order or complex needs related to functional status, cognitive ability, or social support system  Outcome: Progressing Problem: Knowledge Deficit  Goal: Patient/family/caregiver demonstrates understanding of disease process, treatment plan, medications, and discharge instructions  Description: Complete learning assessment and assess knowledge base    Interventions:  - Provide teaching at level of understanding  - Provide teaching via preferred learning methods  Outcome: Progressing     Problem: GASTROINTESTINAL - ADULT  Goal: Maintains or returns to baseline bowel function  Description: INTERVENTIONS:  - Assess bowel function  - Encourage oral fluids to ensure adequate hydration  - Administer IV fluids if ordered to ensure adequate hydration  - Administer ordered medications as needed  - Encourage mobilization and activity  - Consider nutritional services referral to assist patient with adequate nutrition and appropriate food choices  Outcome: Progressing  Goal: Maintains adequate nutritional intake  Description: INTERVENTIONS:  - Monitor percentage of each meal consumed  - Identify factors contributing to decreased intake, treat as appropriate  - Assist with meals as needed  - Monitor I&O, weight, and lab values if indicated  - Obtain nutrition services referral as needed  Outcome: Progressing     Problem: METABOLIC, FLUID AND ELECTROLYTES - ADULT  Goal: Electrolytes maintained within normal limits  Description: INTERVENTIONS:  - Monitor labs and assess patient for signs and symptoms of electrolyte imbalances  - Administer electrolyte replacement as ordered  - Monitor response to electrolyte replacements, including repeat lab results as appropriate  - Instruct patient on fluid and nutrition as appropriate  Outcome: Progressing     Problem: SKIN/TISSUE INTEGRITY - ADULT  Goal: Incision(s), wounds(s) or drain site(s) healing without S/S of infection  Description: INTERVENTIONS  - Assess and document dressing, incision, wound bed, drain sites and surrounding tissue  - Provide patient and family education    Outcome: Progressing

## 2022-10-04 NOTE — SOCIAL WORK
CM met with Pt who reported being agreeable to a referral to the HOST program  CM called and left a message for the HOST program with a referral, and advised HOST of the Pt's pending d/c today  CM will continue to follow  Nicolasa Maki
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
## 2022-10-04 NOTE — PLAN OF CARE
Problem: PAIN - ADULT  Goal: Verbalizes/displays adequate comfort level or baseline comfort level  Description: Interventions:  - Encourage patient to monitor pain and request assistance  - Assess pain using appropriate pain scale  - Administer analgesics based on type and severity of pain and evaluate response  - Implement non-pharmacological measures as appropriate and evaluate response  - Consider cultural and social influences on pain and pain management  - Notify physician/advanced practitioner if interventions unsuccessful or patient reports new pain  10/4/2022 1242 by Rian Pat RN  Outcome: Completed  10/4/2022 0742 by Rian Pat RN  Outcome: Progressing     Problem: INFECTION - ADULT  Goal: Absence or prevention of progression during hospitalization  Description: INTERVENTIONS:  - Assess and monitor for signs and symptoms of infection  - Monitor lab/diagnostic results  - Monitor all insertion sites, i e  indwelling lines, tubes, and drains  - Monitor endotracheal if appropriate and nasal secretions for changes in amount and color  - [place] appropriate cooling/warming therapies per order  - Administer medications as ordered  - Instruct and encourage patient and family to use good hand hygiene technique  - Identify and instruct in appropriate isolation precautions for identified infection/condition  10/4/2022 1242 by Rian Pat RN  Outcome: Completed  10/4/2022 0742 by Rian Pat RN  Outcome: Progressing  Goal: Absence of fever/infection during neutropenic period  Description: INTERVENTIONS:  - Monitor WBC    Outcome: Completed     Problem: SAFETY ADULT  Goal: Patient will remain free of falls  Description: INTERVENTIONS:  - Educate patient/family on patient safety including physical limitations  - Instruct patient to call for assistance with activity   - Consult OT/PT to assist with strengthening/mobility   - Keep Call bell within reach  - Keep bed low and locked with side rails adjusted as appropriate  - Keep care items and personal belongings within reach  - Initiate and maintain comfort rounds  - Apply yellow socks and bracelet for high fall risk patients  - Consider moving patient to room near nurses station  Outcome: Completed  Goal: Maintain or return to baseline ADL function  Description: INTERVENTIONS:  -  Assess patient's ability to carry out ADLs; assess patient's baseline for ADL function and identify physical deficits which impact ability to perform ADLs (bathing, care of mouth/teeth, toileting, grooming, dressing, etc )  - Assess/evaluate cause of self-care deficits   - Assess range of motion  - Assess patient's mobility; develop plan if impaired  - Assess patient's need for assistive devices and provide as appropriate  - Encourage maximum independence but intervene and supervise when necessary  - Involve family in performance of ADLs  - Assess for home care needs following discharge   - Consider OT consult to assist with ADL evaluation and planning for discharge  - Provide patient education as appropriate  Outcome: Completed  Goal: Maintains/Returns to pre admission functional level  Description: INTERVENTIONS:  - Perform BMAT or MOVE assessment daily    - Set and communicate daily mobility goal to care team and patient/family/caregiver     - Collaborate with rehabilitation services on mobility goals if consulted  - Record patient progress and toleration of activity level   Outcome: Completed     Problem: DISCHARGE PLANNING  Goal: Discharge to home or other facility with appropriate resources  Description: INTERVENTIONS:  - Identify barriers to discharge w/patient and caregiver  - Arrange for needed discharge resources and transportation as appropriate  - Identify discharge learning needs (meds, wound care, etc )  - Arrange for interpretive services to assist at discharge as needed  - Refer to Case Management Department for coordinating discharge planning if the patient needs post-hospital services based on physician/advanced practitioner order or complex needs related to functional status, cognitive ability, or social support system  10/4/2022 1242 by Tyra Camara RN  Outcome: Completed  10/4/2022 0742 by Tyra Camara RN  Outcome: Progressing     Problem: Knowledge Deficit  Goal: Patient/family/caregiver demonstrates understanding of disease process, treatment plan, medications, and discharge instructions  Description: Complete learning assessment and assess knowledge base    Interventions:  - Provide teaching at level of understanding  - Provide teaching via preferred learning methods  10/4/2022 1242 by Tyra Camara RN  Outcome: Completed  10/4/2022 0742 by Tyra Camara RN  Outcome: Progressing     Problem: Potential for Falls  Goal: Patient will remain free of falls  Description: INTERVENTIONS:  - Educate patient/family on patient safety including physical limitations  - Instruct patient to call for assistance with activity   - Consult OT/PT to assist with strengthening/mobility   - Keep Call bell within reach  - Keep bed low and locked with side rails adjusted as appropriate  - Keep care items and personal belongings within reach  - Initiate and maintain comfort rounds  - Make Fall Risk Sign visible to staff  - Apply yellow socks and bracelet for high fall risk patients  - Consider moving patient to room near nurses station  Outcome: Completed     Problem: GASTROINTESTINAL - ADULT  Goal: Maintains or returns to baseline bowel function  Description: INTERVENTIONS:  - Assess bowel function  - Encourage oral fluids to ensure adequate hydration  - Administer IV fluids if ordered to ensure adequate hydration  - Administer ordered medications as needed  - Encourage mobilization and activity  - Consider nutritional services referral to assist patient with adequate nutrition and appropriate food choices  10/4/2022 1242 by Tyra Camara RN  Outcome: Completed  10/4/2022 0742 by Mone Holder RN  Outcome: Progressing  Goal: Maintains adequate nutritional intake  Description: INTERVENTIONS:  - Monitor percentage of each meal consumed  - Identify factors contributing to decreased intake, treat as appropriate  - Assist with meals as needed  - Monitor I&O, weight, and lab values if indicated  - Obtain nutrition services referral as needed  10/4/2022 1242 by Mone Holder RN  Outcome: Completed  10/4/2022 0742 by Mone Hodler RN  Outcome: Progressing     Problem: METABOLIC, FLUID AND ELECTROLYTES - ADULT  Goal: Electrolytes maintained within normal limits  Description: INTERVENTIONS:  - Monitor labs and assess patient for signs and symptoms of electrolyte imbalances  - Administer electrolyte replacement as ordered  - Monitor response to electrolyte replacements, including repeat lab results as appropriate  - Instruct patient on fluid and nutrition as appropriate  10/4/2022 1242 by Mone Holder RN  Outcome: Completed  10/4/2022 0742 by Mone Holder RN  Outcome: Progressing     Problem: SKIN/TISSUE INTEGRITY - ADULT  Goal: Incision(s), wounds(s) or drain site(s) healing without S/S of infection  Description: INTERVENTIONS  - Assess and document dressing, incision, wound bed, drain sites and surrounding tissue  - Provide patient and family education    10/4/2022 1242 by Mone Holder RN  Outcome: Completed  10/4/2022 0742 by Mone Holder RN  Outcome: Progressing
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
## 2022-10-05 ENCOUNTER — HOME CARE VISIT (OUTPATIENT)
Dept: HOME HEALTH SERVICES | Facility: HOME HEALTHCARE | Age: 42
End: 2022-10-05
Payer: COMMERCIAL

## 2022-10-05 VITALS
TEMPERATURE: 98.7 F | HEART RATE: 87 BPM | OXYGEN SATURATION: 98 % | SYSTOLIC BLOOD PRESSURE: 102 MMHG | DIASTOLIC BLOOD PRESSURE: 56 MMHG | RESPIRATION RATE: 16 BRPM

## 2022-10-05 PROCEDURE — G0299 HHS/HOSPICE OF RN EA 15 MIN: HCPCS

## 2022-10-05 NOTE — CASE COMMUNICATION
[facility] has admitted your patient to [address] [place] [address] service with the following disciplines:      SN  This report is informational only, no responses is needed  Primary focus of home health care Wound care to abdominal surgical wounds  Patient stated goals of care to gain strength and heal her wounds  Anticipated visit pattern and next visit date daily x 14 days then 3 days/week for 2 weeks  See medication list - meds in home diff er from [facility]   Significant clinical findings  Active c-dif infection  Potential barriers to goal achievement  Patient Jorge Mac of performing wound care due to past wound complications  Other pertinent information  Patient has good support system  Thank you for allowing us to participate in the care of your patient        Selma Tapia RN   [facility]

## 2022-10-06 ENCOUNTER — OFFICE VISIT (OUTPATIENT)
Dept: FAMILY MEDICINE CLINIC | Facility: CLINIC | Age: 42
End: 2022-10-06
Payer: COMMERCIAL

## 2022-10-06 ENCOUNTER — HOME CARE VISIT (OUTPATIENT)
Dept: HOME HEALTH SERVICES | Facility: HOME HEALTHCARE | Age: 42
End: 2022-10-06
Payer: COMMERCIAL

## 2022-10-06 VITALS
HEART RATE: 98 BPM | RESPIRATION RATE: 16 BRPM | SYSTOLIC BLOOD PRESSURE: 103 MMHG | TEMPERATURE: 98.2 F | DIASTOLIC BLOOD PRESSURE: 58 MMHG | OXYGEN SATURATION: 96 %

## 2022-10-06 VITALS
BODY MASS INDEX: 20.08 KG/M2 | OXYGEN SATURATION: 96 % | WEIGHT: 117.6 LBS | HEART RATE: 80 BPM | HEIGHT: 64 IN | DIASTOLIC BLOOD PRESSURE: 78 MMHG | RESPIRATION RATE: 16 BRPM | SYSTOLIC BLOOD PRESSURE: 118 MMHG

## 2022-10-06 DIAGNOSIS — A04.72 CLOSTRIDIUM DIFFICILE DIARRHEA: Primary | ICD-10-CM

## 2022-10-06 PROBLEM — R19.7 DIARRHEA OF PRESUMED INFECTIOUS ORIGIN: Status: RESOLVED | Noted: 2022-09-28 | Resolved: 2022-10-06

## 2022-10-06 PROCEDURE — G0299 HHS/HOSPICE OF RN EA 15 MIN: HCPCS

## 2022-10-06 PROCEDURE — 99215 OFFICE O/P EST HI 40 MIN: CPT | Performed by: NURSE PRACTITIONER
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
## 2022-10-07 ENCOUNTER — HOME CARE VISIT (OUTPATIENT)
Dept: HOME HEALTH SERVICES | Facility: HOME HEALTHCARE | Age: 42
End: 2022-10-07
Payer: COMMERCIAL

## 2022-10-07 VITALS
DIASTOLIC BLOOD PRESSURE: 58 MMHG | RESPIRATION RATE: 18 BRPM | OXYGEN SATURATION: 94 % | TEMPERATURE: 98.4 F | SYSTOLIC BLOOD PRESSURE: 96 MMHG | HEART RATE: 84 BPM

## 2022-10-07 PROCEDURE — G0299 HHS/HOSPICE OF RN EA 15 MIN: HCPCS

## 2022-10-07 NOTE — UTILIZATION REVIEW
Notification of Discharge   This is a Notification of Discharge from our facility [address]  Please be advised that this patient has been discharge from our facility  Below you will find the admission and discharge date and time including the patients disposition  UTILIZATION REVIEW CONTACT:  Rj Small  Utilization   Network Utilization Review Department  Phone: [phone] x carefully listen to the prompts  All voicemails are confidential   Email: [email or web address]     PHYSICIAN ADVISORY SERVICES:  FOR [id number] REVIEW - MEDICAL NECESSITY DENIAL  Phone: [phone]  Fax: [phone]  Email: [email or web address]     PRESENTATION DATE: 9/27/2022  9:46 PM  OBERVATION ADMISSION DATE:   INPATIENT ADMISSION DATE: 9/28/22  4:40 AM   DISCHARGE DATE: 10/4/2022 12:45 PM  DISPOSITION: [facility] with [address] [place] [address] Road INFORMATION:  Send all requests for admission clinical reviews, approved or denied determinations and any other requests to dedicated fax number below belonging to the campus where the patient is receiving treatment   List of dedicated fax numbers:  [address] DENIALS (Administrative/Medical Necessity) [phone]   [address] (Maternity/NICU/Pediatrics) [phone]   [facility] [phone]   [address] [facility] [phone]   [address] [place] [address] [phone]   [address] [phone]   [facility]  [phone]   [address] [place] [address], [facility]  [address] [phone]

## 2022-10-08 ENCOUNTER — HOME CARE VISIT (OUTPATIENT)
Dept: HOME HEALTH SERVICES | Facility: HOME HEALTHCARE | Age: 42
End: 2022-10-08
Payer: COMMERCIAL

## 2022-10-08 VITALS
DIASTOLIC BLOOD PRESSURE: 58 MMHG | TEMPERATURE: 98.4 F | OXYGEN SATURATION: 97 % | SYSTOLIC BLOOD PRESSURE: 104 MMHG | RESPIRATION RATE: 18 BRPM | HEART RATE: 91 BPM

## 2022-10-08 PROCEDURE — G0299 HHS/HOSPICE OF RN EA 15 MIN: HCPCS

## 2022-10-09 ENCOUNTER — HOME CARE VISIT (OUTPATIENT)
Dept: HOME HEALTH SERVICES | Facility: HOME HEALTHCARE | Age: 42
End: 2022-10-09
Payer: COMMERCIAL

## 2022-10-09 VITALS
TEMPERATURE: 97.6 F | SYSTOLIC BLOOD PRESSURE: 100 MMHG | OXYGEN SATURATION: 95 % | DIASTOLIC BLOOD PRESSURE: 62 MMHG | RESPIRATION RATE: 18 BRPM | HEART RATE: 78 BPM

## 2022-10-09 PROCEDURE — G0299 HHS/HOSPICE OF RN EA 15 MIN: HCPCS

## 2022-10-10 ENCOUNTER — OFFICE VISIT (OUTPATIENT)
Dept: SURGERY | Facility: CLINIC | Age: 42
End: 2022-10-10

## 2022-10-10 VITALS
BODY MASS INDEX: 19.02 KG/M2 | HEART RATE: 88 BPM | HEIGHT: 64 IN | SYSTOLIC BLOOD PRESSURE: 100 MMHG | TEMPERATURE: 97.6 F | DIASTOLIC BLOOD PRESSURE: 63 MMHG | WEIGHT: 111.4 LBS

## 2022-10-10 DIAGNOSIS — A04.72 CLOSTRIDIUM DIFFICILE DIARRHEA: Primary | ICD-10-CM

## 2022-10-10 PROCEDURE — 99024 POSTOP FOLLOW-UP VISIT: CPT | Performed by: SURGERY
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
## 2022-10-11 ENCOUNTER — HOME CARE VISIT (OUTPATIENT)
Dept: HOME HEALTH SERVICES | Facility: HOME HEALTHCARE | Age: 42
End: 2022-10-11
Payer: COMMERCIAL

## 2022-10-11 NOTE — HOME HEALTH
Patient seen by General Surgeon yesterday  Patient was told by surgeon that she could now pack her wound herself and would not need SN to do so  Patient declined to have SN visit 10/11 or any future visits

## 2022-10-11 NOTE — Clinical Note
Patient was seen by surgeon yesterday  Patient reports that surgeon told her she could do her own wound care now  Patient declined any further visits from SN at this time  SN to discharge patient

## 2022-10-14 ENCOUNTER — OFFICE VISIT (OUTPATIENT)
Dept: SURGERY | Facility: CLINIC | Age: 42
End: 2022-10-14

## 2022-10-14 VITALS
HEART RATE: 82 BPM | DIASTOLIC BLOOD PRESSURE: 57 MMHG | TEMPERATURE: 98 F | BODY MASS INDEX: 19.07 KG/M2 | WEIGHT: 111.13 LBS | SYSTOLIC BLOOD PRESSURE: 84 MMHG

## 2022-10-14 DIAGNOSIS — T81.31XS POSTOPERATIVE DEHISCENCE OF SKIN WOUND, SEQUELA: Primary | ICD-10-CM

## 2022-10-14 PROCEDURE — 99024 POSTOP FOLLOW-UP VISIT: CPT | Performed by: SURGERY
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
## 2022-10-17 ENCOUNTER — OFFICE VISIT (OUTPATIENT)
Dept: SURGERY | Facility: CLINIC | Age: 42
End: 2022-10-17

## 2022-10-17 VITALS
HEART RATE: 73 BPM | WEIGHT: 115.4 LBS | DIASTOLIC BLOOD PRESSURE: 60 MMHG | SYSTOLIC BLOOD PRESSURE: 97 MMHG | HEIGHT: 64 IN | TEMPERATURE: 97.5 F | BODY MASS INDEX: 19.7 KG/M2

## 2022-10-17 DIAGNOSIS — S36.209S: Primary | ICD-10-CM

## 2022-10-17 PROCEDURE — 99024 POSTOP FOLLOW-UP VISIT: CPT | Performed by: SURGERY
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
## 2022-10-20 ENCOUNTER — RA CDI HCC (OUTPATIENT)
Dept: OTHER | Facility: HOSPITAL | Age: 42
End: 2022-10-20

## 2022-10-20 NOTE — PROGRESS NOTES
[facility] 75  coding opportunities       Chart reviewed, no opportunity found: CHART REVIEWED, NO OPPORTUNITY FOUND        Patients Insurance        Commercial Insurance: [employer]

## 2022-10-22 VITALS
DIASTOLIC BLOOD PRESSURE: 62 MMHG | TEMPERATURE: 98.6 F | OXYGEN SATURATION: 96 % | RESPIRATION RATE: 16 BRPM | HEART RATE: 82 BPM | SYSTOLIC BLOOD PRESSURE: 99 MMHG

## 2022-10-24 ENCOUNTER — OFFICE VISIT (OUTPATIENT)
Dept: SURGERY | Facility: CLINIC | Age: 42
End: 2022-10-24

## 2022-10-24 VITALS
BODY MASS INDEX: 19.63 KG/M2 | SYSTOLIC BLOOD PRESSURE: 97 MMHG | HEART RATE: 74 BPM | HEIGHT: 64 IN | WEIGHT: 115 LBS | DIASTOLIC BLOOD PRESSURE: 61 MMHG

## 2022-10-24 DIAGNOSIS — S36.209S: Primary | ICD-10-CM

## 2022-10-24 PROCEDURE — 99024 POSTOP FOLLOW-UP VISIT: CPT | Performed by: STUDENT IN AN ORGANIZED HEALTH CARE EDUCATION/TRAINING PROGRAM
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
## 2022-10-24 NOTE — LETTER
October 24, 2022     [facility]  [place]  [address] [address]    Patient: Danna Gong   YOB: 1980   Date of Visit: 10/24/2022       Dear Dr Mor Alcantara:    Neda Carrera was under my care  She was seen in the office on 10/24/2022 for follow up  She needs to remain off of work for the next week  She may return to work on 10/31/22 without restrictions  If you have questions, please do not hesitate to our office         Sincerely,    Delisa Guzman MD    [facility] Phys[facility]        CC: No Recipients

## 2022-10-27 ENCOUNTER — OFFICE VISIT (OUTPATIENT)
Dept: FAMILY MEDICINE CLINIC | Facility: CLINIC | Age: 42
End: 2022-10-27

## 2022-10-27 VITALS
DIASTOLIC BLOOD PRESSURE: 72 MMHG | RESPIRATION RATE: 16 BRPM | WEIGHT: 114.7 LBS | SYSTOLIC BLOOD PRESSURE: 112 MMHG | OXYGEN SATURATION: 98 % | BODY MASS INDEX: 19.69 KG/M2 | HEART RATE: 80 BPM

## 2022-10-27 DIAGNOSIS — D64.9 LOW HEMOGLOBIN: ICD-10-CM

## 2022-10-27 DIAGNOSIS — Z23 NEED FOR MENINGOCOCCAL VACCINATION: ICD-10-CM

## 2022-10-27 DIAGNOSIS — Z90.81 ASPLENIA AFTER SURGICAL PROCEDURE: Primary | ICD-10-CM

## 2022-10-27 DIAGNOSIS — Z23 NEED FOR INFLUENZA VACCINATION: ICD-10-CM

## 2022-10-27 DIAGNOSIS — S36.209D: ICD-10-CM
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
## 2022-11-02 ENCOUNTER — OFFICE VISIT (OUTPATIENT)
Dept: SURGERY | Facility: CLINIC | Age: 42
End: 2022-11-02

## 2022-11-02 VITALS
HEIGHT: 64 IN | WEIGHT: 117.4 LBS | HEART RATE: 70 BPM | SYSTOLIC BLOOD PRESSURE: 101 MMHG | TEMPERATURE: 97.7 F | BODY MASS INDEX: 20.04 KG/M2 | DIASTOLIC BLOOD PRESSURE: 61 MMHG

## 2022-11-02 DIAGNOSIS — Z98.890 STATUS POST LAPAROTOMY: Primary | ICD-10-CM

## 2022-11-02 NOTE — ASSESSMENT & PLAN NOTE
The midline incisions healing well except for 2 areas that have hypergranulation tissue which probably accounts for the drainage  Silver nitrate was applied to both of these areas  Regarding the pain, I told I do not feel anything out of the ordinary  Probably just postop hand should gradually get better on its own  See back 1 week for more silver nitrate

## 2022-11-02 NOTE — PROGRESS NOTES
Office Visit - General Surgery  Makenzie Mosher MRN: [id number]  Encounter: [id number]    Assessment and Plan  Problem List Items Addressed This Visit        Other    Status post laparotomy - Primary     The midline incisions healing well except for 2 areas that have hypergranulation tissue which probably accounts for the drainage  Silver nitrate was applied to both of these areas  Regarding the pain, I told I do not feel anything out of the ordinary  Probably just postop hand should gradually get better on its own  See back 1 week for more silver nitrate  Chief Complaint:  Makenzie Mosher is a 35 y o  female who presents for Wound Check (Wound check abdomen  Drainage and increased pain )    Subjective  78-year-old female status post laparotomy who still has some drainage from her midline wound  Also some pain left upper quadrant  Pain is not there all the time  Sometimes gets a lot of bloating and discomfort left upper quadrant which is relieved somewhat when she pushes on area  Do fever or chills    Tolerating diet and bowels and bladder functioning    Past Medical History:   Diagnosis Date   • Seizures ([address] )        Past Surgical History:   Procedure Laterality Date   • ANTERIOR CRUCIATE LIGAMENT REPAIR     • CLOSED REDUCTION MANDIBLE Bilateral 6/26/2016    Procedure: CLOSED REDUCTION MANDIBLE;  Surgeon: Malcolm Singh DMD;  [facility];  Service:    • COLPOSCOPY W/ BIOPSY / Miriam Lynch  03/30/2015    COLP neg results /    • DILATION AND CURETTAGE, DIAGNOSTIC / THERAPEUTIC  06/2010    possible polyp   • DISTAL PANCREATECTOMY  9/11/2022    Procedure: PANCREATECTOMY DISTAL;  Surgeon: Burke Hough DO;  [facility];  Service: General   • [facility] Gina Crump   • KNEE ARTHROSCOPY  2007    ACL repair   • NOSE SURGERY  1996    revision   • NM LAP,DIAGNOSTIC ABDOMEN N/A 9/11/2022    Procedure: LAPAROSCOPY DIAGNOSTIC, CONVERTED TO OPEN;  Surgeon: Burke Hough DO; [facility];  Service: General   • SPLENECTOMY, TOTAL N/A 9/11/2022    Procedure: SPLENECTOMY;  Surgeon: Zenaida Hough DO;  [facility];  Service: General       Family History   Problem Relation Age of Onset   • No Known Problems Mother    • Other Father         amyotrophic lateral sclerosis   • Osteoporosis Maternal Grandmother    • Tuberculosis Maternal Grandmother    • Stroke Maternal Grandfather         stroke syndrome   • Alzheimer's disease Paternal Grandmother    • Alzheimer's disease Paternal Grandfather    • Thyroid cancer Sister 40   • No Known Problems Maternal Aunt    • No Known Problems Paternal Aunt    • Substance Abuse Neg Hx    • Mental illness Neg Hx    • BRCA2 Positive Neg Hx    • BRCA2 Negative Neg Hx    • BRCA 1/2 Neg Hx    • BRCA1 Positive Neg Hx    • BRCA1 Negative Neg Hx    • Ovarian cancer Neg Hx    • Endometrial cancer Neg Hx    • Breast cancer additional onset Neg Hx    • Colon cancer Neg Hx    • Breast cancer Neg Hx        Social History     Tobacco Use   • Smoking status: Never Smoker   • Smokeless tobacco: Never Used   Vaping Use   • Vaping Use: Never used   Substance Use Topics   • Alcohol use: Not Currently     Comment: former consumption excessive drinking   • Drug use: Never        Medications  Current Outpatient Medications on File Prior to Visit   Medication Sig Dispense Refill   • acetaminophen (TYLENOL) 325 mg tablet Take 3 tablets (975 mg total) by mouth every 6 (six) hours as needed for mild pain  0   • Alcohol Swabs 70 % PADS May substitute brand based on insurance coverage  Check glucose BID  (Patient not taking: No sig reported) 100 each 0   • Blood Glucose Monitoring Suppl (OneTouch Verio Reflect) w/Device KIT May substitute brand based on insurance coverage  Check glucose BID  (Patient not taking: No sig reported) 1 kit 0   • glucose blood (OneTouch Verio) test strip May substitute brand based on insurance coverage  Check glucose BID   (Patient not taking: No sig reported) 100 each 0   • OneTouch Delica Lancets 76U MISC May substitute brand based on insurance coverage  Check glucose BID  (Patient not taking: No sig reported) 100 each 0     No current facility-administered medications on file prior to visit  Allergies  Allergies   Allergen Reactions   • Sulfa Antibiotics Hives       Review of Systems    Objective  Vitals:    11/02/22 0846   BP: 101/61   Pulse: 70   Temp: 97 7 °F (36 5 °C)       Physical Exam   Abdomen:  Midline incision with 2 open areas the top 1 is about 1 5 by 0 7 the bottom line is about 1 by 0 6 cm with hypergranulation tissue at both sites    Abdomen was soft nontender no masses appreciated

## 2022-11-03 ENCOUNTER — APPOINTMENT (OUTPATIENT)
Dept: LAB | Facility: CLINIC | Age: 42
End: 2022-11-03

## 2022-11-03 DIAGNOSIS — S36.209D: ICD-10-CM

## 2022-11-03 DIAGNOSIS — D64.9 LOW HEMOGLOBIN: ICD-10-CM

## 2022-11-03 LAB
ALBUMIN SERPL BCP-MCNC: 4 G/DL (ref 3.5–5)
ALP SERPL-CCNC: 91 U/L (ref 46–116)
ALT SERPL W P-5'-P-CCNC: 18 U/L (ref 12–78)
ANION GAP SERPL CALCULATED.3IONS-SCNC: 4 MMOL/L (ref 4–13)
ANISOCYTOSIS BLD QL SMEAR: PRESENT
AST SERPL W P-5'-P-CCNC: 12 U/L (ref 5–45)
BASOPHILS # BLD MANUAL: 0.2 THOUSAND/UL (ref 0–0.1)
BASOPHILS NFR MAR MANUAL: 3 % (ref 0–1)
BILIRUB SERPL-MCNC: 0.41 MG/DL (ref 0.2–1)
BUN SERPL-MCNC: 11 MG/DL (ref 5–25)
CALCIUM SERPL-MCNC: 9.5 MG/DL (ref 8.3–10.1)
CHLORIDE SERPL-SCNC: 105 MMOL/L (ref 96–108)
CO2 SERPL-SCNC: 27 MMOL/L (ref 21–32)
CREAT SERPL-MCNC: 0.67 MG/DL (ref 0.6–1.3)
EOSINOPHIL # BLD MANUAL: 0.34 THOUSAND/UL (ref 0–0.4)
EOSINOPHIL NFR BLD MANUAL: 5 % (ref 0–6)
ERYTHROCYTE [DISTWIDTH] IN BLOOD BY AUTOMATED COUNT: 14.7 % (ref 11.6–15.1)
GFR SERPL CREATININE-BSD FRML MDRD: 108 ML/MIN/1.73SQ M
GLUCOSE P FAST SERPL-MCNC: 103 MG/DL (ref 65–99)
HCT VFR BLD AUTO: 36.8 % (ref 34.8–46.1)
HGB BLD-MCNC: 11.6 G/DL (ref 11.5–15.4)
LYMPHOCYTES # BLD AUTO: 1.84 THOUSAND/UL (ref 0.6–4.47)
LYMPHOCYTES # BLD AUTO: 27 % (ref 14–44)
MACROCYTES BLD QL AUTO: PRESENT
MCH RBC QN AUTO: 29.4 PG (ref 26.8–34.3)
MCHC RBC AUTO-ENTMCNC: 31.5 G/DL (ref 31.4–37.4)
MCV RBC AUTO: 93 FL (ref 82–98)
MONOCYTES # BLD AUTO: 0.68 THOUSAND/UL (ref 0–1.22)
MONOCYTES NFR BLD: 10 % (ref 4–12)
NEUTROPHILS # BLD MANUAL: 3.47 THOUSAND/UL (ref 1.85–7.62)
NEUTS SEG NFR BLD AUTO: 51 % (ref 43–75)
PLATELET # BLD AUTO: 453 THOUSANDS/UL (ref 149–390)
PLATELET BLD QL SMEAR: ABNORMAL
PMV BLD AUTO: 10.2 FL (ref 8.9–12.7)
POIKILOCYTOSIS BLD QL SMEAR: PRESENT
POTASSIUM SERPL-SCNC: 4.4 MMOL/L (ref 3.5–5.3)
PROT SERPL-MCNC: 7.9 G/DL (ref 6.4–8.4)
RBC # BLD AUTO: 3.94 MILLION/UL (ref 3.81–5.12)
RBC MORPH BLD: PRESENT
SODIUM SERPL-SCNC: 136 MMOL/L (ref 135–147)
TARGETS BLD QL SMEAR: PRESENT
VARIANT LYMPHS # BLD AUTO: 4 %
WBC # BLD AUTO: 6.81 THOUSAND/UL (ref 4.31–10.16)

## 2022-11-05 ENCOUNTER — HOSPITAL ENCOUNTER (EMERGENCY)
Facility: HOSPITAL | Age: 42
Discharge: HOME | End: 2022-11-05
Attending: EMERGENCY MEDICINE

## 2022-11-05 VITALS
HEART RATE: 68 BPM | RESPIRATION RATE: 18 BRPM | OXYGEN SATURATION: 100 % | TEMPERATURE: 97.7 F | DIASTOLIC BLOOD PRESSURE: 58 MMHG | SYSTOLIC BLOOD PRESSURE: 117 MMHG

## 2022-11-05 DIAGNOSIS — R10.9 ABDOMINAL PAIN: Primary | ICD-10-CM

## 2022-11-05 LAB
ALBUMIN SERPL BCP-MCNC: 4 G/DL (ref 3.5–5)
ALP SERPL-CCNC: 88 U/L (ref 46–116)
ALT SERPL W P-5'-P-CCNC: 16 U/L (ref 12–78)
ANION GAP SERPL CALCULATED.3IONS-SCNC: 3 MMOL/L (ref 4–13)
AST SERPL W P-5'-P-CCNC: 11 U/L (ref 5–45)
BASOPHILS # BLD AUTO: 0.08 THOUSANDS/ÂΜL (ref 0–0.1)
BASOPHILS NFR BLD AUTO: 1 % (ref 0–1)
BILIRUB SERPL-MCNC: 0.98 MG/DL (ref 0.2–1)
BUN SERPL-MCNC: 10 MG/DL (ref 5–25)
CALCIUM SERPL-MCNC: 9.4 MG/DL (ref 8.3–10.1)
CHLORIDE SERPL-SCNC: 104 MMOL/L (ref 96–108)
CO2 SERPL-SCNC: 27 MMOL/L (ref 21–32)
CREAT SERPL-MCNC: 0.66 MG/DL (ref 0.6–1.3)
EOSINOPHIL # BLD AUTO: 0.18 THOUSAND/ÂΜL (ref 0–0.61)
EOSINOPHIL NFR BLD AUTO: 2 % (ref 0–6)
ERYTHROCYTE [DISTWIDTH] IN BLOOD BY AUTOMATED COUNT: 14.4 % (ref 11.6–15.1)
GFR SERPL CREATININE-BSD FRML MDRD: 108 ML/MIN/1.73SQ M
GLUCOSE SERPL-MCNC: 126 MG/DL (ref 65–140)
HCT VFR BLD AUTO: 36.9 % (ref 34.8–46.1)
HGB BLD-MCNC: 11.9 G/DL (ref 11.5–15.4)
IMM GRANULOCYTES # BLD AUTO: 0.03 THOUSAND/UL (ref 0–0.2)
IMM GRANULOCYTES NFR BLD AUTO: 0 % (ref 0–2)
LIPASE SERPL-CCNC: 311 U/L (ref 73–393)
LYMPHOCYTES # BLD AUTO: 2.31 THOUSANDS/ÂΜL (ref 0.6–4.47)
LYMPHOCYTES NFR BLD AUTO: 21 % (ref 14–44)
MCH RBC QN AUTO: 29.8 PG (ref 26.8–34.3)
MCHC RBC AUTO-ENTMCNC: 32.2 G/DL (ref 31.4–37.4)
MCV RBC AUTO: 93 FL (ref 82–98)
MONOCYTES # BLD AUTO: 0.89 THOUSAND/ÂΜL (ref 0.17–1.22)
MONOCYTES NFR BLD AUTO: 8 % (ref 4–12)
NEUTROPHILS # BLD AUTO: 7.76 THOUSANDS/ÂΜL (ref 1.85–7.62)
NEUTS SEG NFR BLD AUTO: 68 % (ref 43–75)
NRBC BLD AUTO-RTO: 0 /100 WBCS
PLATELET # BLD AUTO: 443 THOUSANDS/UL (ref 149–390)
PMV BLD AUTO: 8.8 FL (ref 8.9–12.7)
POTASSIUM SERPL-SCNC: 3.6 MMOL/L (ref 3.5–5.3)
PROT SERPL-MCNC: 8 G/DL (ref 6.4–8.4)
RBC # BLD AUTO: 3.99 MILLION/UL (ref 3.81–5.12)
SODIUM SERPL-SCNC: 134 MMOL/L (ref 135–147)
WBC # BLD AUTO: 11.25 THOUSAND/UL (ref 4.31–10.16)

## 2022-11-05 RX ORDER — DICYCLOMINE HCL 20 MG
20 TABLET ORAL 2 TIMES DAILY PRN
Qty: 30 TABLET | Refills: 0 | Status: SHIPPED | OUTPATIENT
Start: 2022-11-05

## 2022-11-05 RX ORDER — DICYCLOMINE HCL 20 MG
20 TABLET ORAL ONCE
Status: DISCONTINUED | OUTPATIENT
Start: 2022-11-05 | End: 2022-11-05 | Stop reason: HOSPADM

## 2022-11-05 NOTE — ED ATTENDING ATTESTATION
11/5/2022  I, Trang Ye MD, saw and evaluated the patient  I have discussed the patient with the resident/non-physician practitioner and agree with the resident's/non-physician practitioner's findings, Plan of Care, and MDM as documented in the resident's/non-physician practitioner's note, except where noted  All available labs and Radiology studies were reviewed  I was present for key portions of any procedure(s) performed by the resident/non-physician practitioner and I was immediately available to provide assistance  At this point I agree with the current assessment done in the Emergency Department    I have conducted an independent evaluation of this patient a history and physical is as follows:  Pt is sp splenectomy and has finished a course of vanco for c diff Pt has been having bloating and loose black stools Pt did start pepto recently  No fevers no vomiting PE: alert midline incision healing abd soft nontender MDM: will do labs cdiff pcr   ED Course         Critical Care Time  Procedures

## 2022-11-05 NOTE — DISCHARGE INSTRUCTIONS
You have been seen for abdominal pain  You should return to the ED if you develop worsening pain, bleeding, black stool despite stopping Pepto, or other worsening symptoms  Follow up with General surgery  Take Bentyl as needed for bloating sensation
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
## 2022-11-09 ENCOUNTER — OFFICE VISIT (OUTPATIENT)
Dept: SURGERY | Facility: CLINIC | Age: 42
End: 2022-11-09

## 2022-11-09 ENCOUNTER — APPOINTMENT (OUTPATIENT)
Dept: LAB | Facility: CLINIC | Age: 42
End: 2022-11-09

## 2022-11-09 VITALS
TEMPERATURE: 97.2 F | HEART RATE: 83 BPM | HEIGHT: 64 IN | WEIGHT: 115.9 LBS | BODY MASS INDEX: 19.79 KG/M2 | DIASTOLIC BLOOD PRESSURE: 59 MMHG | SYSTOLIC BLOOD PRESSURE: 92 MMHG

## 2022-11-09 DIAGNOSIS — A04.72 CLOSTRIDIUM DIFFICILE DIARRHEA: Primary | ICD-10-CM

## 2022-11-09 DIAGNOSIS — R10.9 ABDOMINAL PAIN: ICD-10-CM

## 2022-11-09 NOTE — PROGRESS NOTES
Office Visit - General Surgery  Kartik Philippe MRN: [id number]  Encounter: [id number]    Assessment and Plan  Problem List Items Addressed This Visit        Digestive    Clostridium difficile diarrhea - Primary     She is having diarrhea again similar to when she had C diff  Will have her go for testing and give her results when available  Chief Complaint:  Kartik Philippe is a 35 y o  female who presents for Wound Check (Wound check)    Subjective  43 abdominal wound is opened up somewhat and she began packing and again  She has also been having more frequent stools similar to what she had when she had C diff      Past Medical History:   Diagnosis Date   • Seizures [facility])        Past Surgical History:   Procedure Laterality Date   • ANTERIOR CRUCIATE LIGAMENT REPAIR     • CLOSED REDUCTION MANDIBLE Bilateral 6/26/2016    Procedure: CLOSED REDUCTION MANDIBLE;  Surgeon: Ebenezer Nuñez DMD;  [facility];  Service:    • COLPOSCOPY W/ BIOPSY / Leonia Edge  03/30/2015    COLP neg results /    • DILATION AND CURETTAGE, DIAGNOSTIC / THERAPEUTIC  06/2010    possible polyp   • DISTAL PANCREATECTOMY  9/11/2022    Procedure: PANCREATECTOMY DISTAL;  Surgeon: Deb Hough DO;  [facility];  Service: General   [facility]   • KNEE ARTHROSCOPY  2007    ACL repair   • NOSE SURGERY  1996    revision   • CO LAP,DIAGNOSTIC ABDOMEN N/A 9/11/2022    Procedure: LAPAROSCOPY DIAGNOSTIC, CONVERTED TO OPEN;  Surgeon: Deb Hough DO;  [facility];  Service: General   • SPLENECTOMY, TOTAL N/A 9/11/2022    Procedure: SPLENECTOMY;  Surgeon: Deb Hough DO;  [facility];  Service: General       Family History   Problem Relation Age of Onset   • No Known Problems Mother    • Other Father         amyotrophic lateral sclerosis   • Osteoporosis Maternal Grandmother    • Tuberculosis Maternal Grandmother    • Stroke Maternal Grandfather         stroke syndrome   • Alzheimer's disease Paternal Grandmother    • Alzheimer's disease Paternal Grandfather    • Thyroid cancer Sister 40   • No Known Problems Maternal Aunt    • No Known Problems Paternal Aunt    • Substance Abuse Neg Hx    • Mental illness Neg Hx    • BRCA2 Positive Neg Hx    • BRCA2 Negative Neg Hx    • BRCA 1/2 Neg Hx    • BRCA1 Positive Neg Hx    • BRCA1 Negative Neg Hx    • Ovarian cancer Neg Hx    • Endometrial cancer Neg Hx    • Breast cancer additional onset Neg Hx    • Colon cancer Neg Hx    • Breast cancer Neg Hx        Social History     Tobacco Use   • Smoking status: Never Smoker   • Smokeless tobacco: Never Used   Vaping Use   • Vaping Use: Never used   Substance Use Topics   • Alcohol use: Not Currently     Comment: former consumption excessive drinking   • Drug use: Never        Medications  Current Outpatient Medications on File Prior to Visit   Medication Sig Dispense Refill   • acetaminophen (TYLENOL) 325 mg tablet Take 3 tablets (975 mg total) by mouth every 6 (six) hours as needed for mild pain  0   • Alcohol Swabs 70 % PADS May substitute brand based on insurance coverage  Check glucose BID  (Patient not taking: No sig reported) 100 each 0   • Blood Glucose Monitoring Suppl (OneTouch Verio Reflect) w/Device KIT May substitute brand based on insurance coverage  Check glucose BID  (Patient not taking: No sig reported) 1 kit 0   • dicyclomine (BENTYL) 20 mg tablet Take 1 tablet (20 mg total) by mouth 2 (two) times a day as needed (bloating) 30 tablet 0   • glucose blood (OneTouch Verio) test strip May substitute brand based on insurance coverage  Check glucose BID  (Patient not taking: No sig reported) 100 each 0   • OneTouch Delica Lancets 21O MISC May substitute brand based on insurance coverage  Check glucose BID  (Patient not taking: No sig reported) 100 each 0     No current facility-administered medications on file prior to visit         Allergies  Allergies   Allergen Reactions   • Sulfa Antibiotics Hives       Review of Systems    Objective  Vitals:    11/09/22 0831   BP: 92/59   Pulse: 83   Temp: (!) 97 2 °F (36 2 °C)       Physical Exam   Abdomen:  Midline wound healing with 3 open areas the middle 1 is almost a cm deep    There fairly clean, remaining abdomen is soft

## 2022-11-09 NOTE — ASSESSMENT & PLAN NOTE
Will continue wound packing for now  Asked her to pack the wound lightly  Okay to shower and get the wounds wet  Recheck in about 2 weeks

## 2022-11-09 NOTE — ASSESSMENT & PLAN NOTE
She is having diarrhea again similar to when she had C diff  Will have her go for testing and give her results when available

## 2022-11-10 ENCOUNTER — TELEPHONE (OUTPATIENT)
Dept: SURGERY | Facility: CLINIC | Age: 42
End: 2022-11-10

## 2022-11-10 DIAGNOSIS — A04.72 CLOSTRIDIUM DIFFICILE DIARRHEA: Primary | ICD-10-CM

## 2022-11-10 LAB
C DIFF TOX A+B STL QL IA: NEGATIVE
C DIFF TOX GENS STL QL NAA+PROBE: POSITIVE

## 2022-11-10 RX ORDER — VANCOMYCIN HYDROCHLORIDE 125 MG/1
125 CAPSULE ORAL 4 TIMES DAILY
Qty: 40 CAPSULE | Refills: 0 | Status: SHIPPED | OUTPATIENT
Start: 2022-11-10 | End: 2022-11-20

## 2022-11-23 ENCOUNTER — OFFICE VISIT (OUTPATIENT)
Dept: SURGERY | Facility: CLINIC | Age: 42
End: 2022-11-23

## 2022-11-23 VITALS
HEART RATE: 76 BPM | HEIGHT: 64 IN | SYSTOLIC BLOOD PRESSURE: 101 MMHG | WEIGHT: 120 LBS | DIASTOLIC BLOOD PRESSURE: 63 MMHG | BODY MASS INDEX: 20.49 KG/M2

## 2022-11-23 DIAGNOSIS — Z98.890 STATUS POST LAPAROTOMY: Primary | ICD-10-CM

## 2022-11-23 DIAGNOSIS — A04.72 CLOSTRIDIUM DIFFICILE DIARRHEA: ICD-10-CM

## 2022-11-23 RX ORDER — SODIUM FLUORIDE 1.1 G/100G
GEL, DENTIFRICE ORAL
COMMUNITY
Start: 2022-11-17

## 2022-11-23 NOTE — PROGRESS NOTES
Office Visit - General Surgery  Sharifa Gusman MRN: [id number]  Encounter: [id number]    Assessment and Plan  Problem List Items Addressed This Visit        Digestive    Clostridium difficile diarrhea     Diarrhea has resolved  Things seem improved  Other    Status post laparotomy - Primary     Silver nitrate was applied to the open area of the wound  Told her this should gradually settle down and heal   Call if there are any problems  Chief Complaint:  Sharifa Gusman is a 35 y o  female who presents for Follow-up    Subjective  35year old follow-up laparotomy for pancreatic injury  As she was having diarrhea test positive for C diff and was treated with oral vancomycin  Her diarrhea has since resolved  She notices which she thinks may be a staple underneath the skin  The lower end of the wound that had been opened has opened and drained a little bit again      Past Medical History:   Diagnosis Date   • Seizures [facility])        Past Surgical History:   Procedure Laterality Date   • ANTERIOR CRUCIATE LIGAMENT REPAIR     • CLOSED REDUCTION MANDIBLE Bilateral 6/26/2016    Procedure: CLOSED REDUCTION MANDIBLE;  Surgeon: Shea Azar DMD;  [facility];  Service:    • COLPOSCOPY W/ BIOPSY / Kerry Rein  03/30/2015    COLP neg results /    • DILATION AND CURETTAGE, DIAGNOSTIC / THERAPEUTIC  06/2010    possible polyp   • DISTAL PANCREATECTOMY  9/11/2022    Procedure: PANCREATECTOMY DISTAL;  Surgeon: Jacqueline Alcantar To, ;  [facility];  Service: General   • [facility] Crump   • KNEE ARTHROSCOPY  2007    ACL repair   • NOSE SURGERY  1996    revision   • LA LAP,DIAGNOSTIC ABDOMEN N/A 9/11/2022    Procedure: LAPAROSCOPY DIAGNOSTIC, CONVERTED TO OPEN;  Surgeon: Jacqueline Hough, DO;  [facility];  Service: General   • SPLENECTOMY, TOTAL N/A 9/11/2022    Procedure: SPLENECTOMY;  Surgeon: Jacqueline Hough DO;  [facility];  Service: General       Family History   Problem Relation Age of Onset   • No Known Problems Mother    • Other Father         amyotrophic lateral sclerosis   • Osteoporosis Maternal Grandmother    • Tuberculosis Maternal Grandmother    • Stroke Maternal Grandfather         stroke syndrome   • Alzheimer's disease Paternal Grandmother    • Alzheimer's disease Paternal Grandfather    • Thyroid cancer Sister 40   • No Known Problems Maternal Aunt    • No Known Problems Paternal Aunt    • Substance Abuse Neg Hx    • Mental illness Neg Hx    • BRCA2 Positive Neg Hx    • BRCA2 Negative Neg Hx    • BRCA 1/2 Neg Hx    • BRCA1 Positive Neg Hx    • BRCA1 Negative Neg Hx    • Ovarian cancer Neg Hx    • Endometrial cancer Neg Hx    • Breast cancer additional onset Neg Hx    • Colon cancer Neg Hx    • Breast cancer Neg Hx        Social History     Tobacco Use   • Smoking status: Never   • Smokeless tobacco: Never   Vaping Use   • Vaping Use: Never used   Substance Use Topics   • Alcohol use: Not Currently     Comment: former consumption excessive drinking   • Drug use: Never        Medications  Current Outpatient Medications on File Prior to Visit   Medication Sig Dispense Refill   • acetaminophen (TYLENOL) 325 mg tablet Take 3 tablets (975 mg total) by mouth every 6 (six) hours as needed for mild pain  0   • Denta 5000 Plus 1 1 % CREA USE TWICE DAILY  DO NOT RINSE     • Alcohol Swabs 70 % PADS May substitute brand based on insurance coverage  Check glucose BID  (Patient not taking: Reported on 10/10/2022) 100 each 0   • Blood Glucose Monitoring Suppl (OneTouch Verio Reflect) w/Device KIT May substitute brand based on insurance coverage  Check glucose BID  (Patient not taking: Reported on 10/10/2022) 1 kit 0   • dicyclomine (BENTYL) 20 mg tablet Take 1 tablet (20 mg total) by mouth 2 (two) times a day as needed (bloating) 30 tablet 0   • glucose blood (OneTouch Verio) test strip May substitute brand based on insurance coverage  Check glucose BID   (Patient not taking: Reported on 10/24/2022) 100 each 0   • OneTouch Delica Lancets 03H MISC May substitute brand based on insurance coverage  Check glucose BID  (Patient not taking: Reported on 10/10/2022) 100 each 0     No current facility-administered medications on file prior to visit  Allergies  Allergies   Allergen Reactions   • Sulfa Antibiotics Hives       Review of Systems    Objective  Vitals:    11/23/22 0846   BP: 101/63   Pulse: 76       Physical Exam   Abdomen:  Midline incision healing well  There is a suture present underneath the skin just below removal like us    Lower abdomen is an open area about 0 8 x 1 cm with slightly hypergranulation tissue

## 2022-11-23 NOTE — ASSESSMENT & PLAN NOTE
Silver nitrate was applied to the open area of the wound  Told her this should gradually settle down and heal   Call if there are any problems

## 2022-11-27 PROBLEM — A41.9 SEPSIS (HCC): Status: RESOLVED | Noted: 2022-09-28 | Resolved: 2022-11-27

## 2022-12-09 PROBLEM — A04.72 CLOSTRIDIUM DIFFICILE DIARRHEA: Status: RESOLVED | Noted: 2022-10-06 | Resolved: 2022-12-09

## 2022-12-14 ENCOUNTER — CLINICAL SUPPORT (OUTPATIENT)
Dept: FAMILY MEDICINE CLINIC | Facility: CLINIC | Age: 42
End: 2022-12-14

## 2022-12-14 DIAGNOSIS — Z23 NEED FOR PNEUMOCOCCAL VACCINATION: ICD-10-CM

## 2022-12-14 DIAGNOSIS — Z23 NEED FOR MENINGOCOCCAL VACCINATION: Primary | ICD-10-CM

## 2022-12-16 ENCOUNTER — APPOINTMENT (OUTPATIENT)
Dept: LAB | Facility: CLINIC | Age: 42
End: 2022-12-16
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
## 2022-12-16 LAB
EST. AVERAGE GLUCOSE BLD GHB EST-MCNC: 117 MG/DL
HBA1C MFR BLD: 5.7 %

## 2023-01-05 DIAGNOSIS — L65.9 HAIR LOSS: Primary | ICD-10-CM

## 2023-01-06 ENCOUNTER — APPOINTMENT (OUTPATIENT)
Dept: LAB | Facility: CLINIC | Age: 43
End: 2023-01-06

## 2023-01-06 DIAGNOSIS — L65.9 HAIR LOSS: ICD-10-CM

## 2023-01-06 LAB
FOLATE SERPL-MCNC: 17.8 NG/ML (ref 3.1–17.5)
TSH SERPL DL<=0.05 MIU/L-ACNC: 1.21 UIU/ML (ref 0.45–4.5)
VIT B12 SERPL-MCNC: 543 PG/ML (ref 100–900)

## 2023-01-10 LAB — VIT B2 BLD-MCNC: 238 UG/L (ref 137–370)

## 2023-01-12 ENCOUNTER — CLINICAL SUPPORT (OUTPATIENT)
Dept: FAMILY MEDICINE CLINIC | Facility: CLINIC | Age: 43
End: 2023-01-12

## 2023-01-12 DIAGNOSIS — Z23 NEED FOR MENINGOCOCCAL VACCINATION: Primary | ICD-10-CM

## 2023-01-12 LAB — MISCELLANEOUS LAB TEST RESULT: NORMAL
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
## 2023-02-07 RX ORDER — BLOOD SUGAR DIAGNOSTIC
STRIP MISCELLANEOUS
Qty: 100 EACH | Refills: 3 | Status: SHIPPED | OUTPATIENT
Start: 2023-02-07

## 2023-02-07 RX ORDER — LANCETS 33 GAUGE
EACH MISCELLANEOUS
Qty: 100 EACH | Refills: 3 | Status: SHIPPED | OUTPATIENT
Start: 2023-02-07

## 2023-03-01 ENCOUNTER — HOSPITAL ENCOUNTER (OUTPATIENT)
Dept: MAMMOGRAPHY | Facility: CLINIC | Age: 43
Discharge: HOME | End: 2023-03-01

## 2023-03-01 ENCOUNTER — HOSPITAL ENCOUNTER (OUTPATIENT)
Dept: ULTRASOUND IMAGING | Facility: CLINIC | Age: 43
Discharge: HOME | End: 2023-03-01

## 2023-03-01 DIAGNOSIS — N63.10 BREAST MASS, RIGHT: ICD-10-CM

## 2023-04-30 ENCOUNTER — OFFICE VISIT (OUTPATIENT)
Dept: URGENT CARE | Age: 43
End: 2023-04-30

## 2023-04-30 VITALS
TEMPERATURE: 98 F | OXYGEN SATURATION: 99 % | SYSTOLIC BLOOD PRESSURE: 140 MMHG | RESPIRATION RATE: 18 BRPM | DIASTOLIC BLOOD PRESSURE: 65 MMHG | HEART RATE: 61 BPM

## 2023-04-30 DIAGNOSIS — L30.9 ECZEMA, UNSPECIFIED TYPE: Primary | ICD-10-CM

## 2023-04-30 RX ORDER — PREDNISONE 20 MG/1
20 TABLET ORAL 2 TIMES DAILY WITH MEALS
Qty: 10 TABLET | Refills: 0 | Status: SHIPPED | OUTPATIENT
Start: 2023-04-30 | End: 2023-05-05

## 2023-04-30 NOTE — PROGRESS NOTES
[facility]        NAME: Ibrahima Hooper is a 38 y o  female  : 1980    MRN: [id number]  DATE: 2023  TIME: 1:25 PM    Assessment and Plan   Eczema, unspecified type [L30 9]  1  Eczema, unspecified type  predniSONE 20 mg tablet            Patient Instructions     Take med as prescribed  Cont with lips moisturizing agent  Keep appt with dermatology in 3 weeks  Follow up with PCP in 3-5 days  Proceed to  ER if symptoms worsen  Chief Complaint     Chief Complaint   Patient presents with    Oral Pain     Patient states that about three weeks ago her lips became swollen and red  She states that she has tried OTC medication and scheduled an apt with dermatology  She is in pain and describes it as a burning feeling  Notes hx of splenectomy from 2022 and is not sure if it is related         History of Present Illness       HPI   She started with redness and irritation around her lips which started approximately 3 weeks ago  She has tried chapstick, vaseline, neosporin, aquaphor, coconut oil and hydrocortisone with no improvement in symptoms  She has an upcoming appointment with Dermatology but not for a few more weeks  Review of Systems   Review of Systems   Constitutional: Negative for fever  HENT: Negative for trouble swallowing  Mouth sores: around bilateral lips  Respiratory: Negative for chest tightness and shortness of breath  Skin: Positive for rash (around bilateral lips)  Current Medications       Current Outpatient Medications:     glucose blood (OneTouch Verio) test strip, May substitute brand based on insurance coverage  Check glucose BID , Disp: 100 each, Rfl: 3    OneTouch Delica Lancets 38J MISC, May substitute brand based on insurance coverage   Check glucose BID , Disp: 100 each, Rfl: 3    predniSONE 20 mg tablet, Take 1 tablet (20 mg total) by mouth 2 (two) times a day with meals for 5 days, Disp: 10 tablet, Rfl: 0    acetaminophen (TYLENOL) 325 mg tablet, Take 3 tablets (975 mg total) by mouth every 6 (six) hours as needed for mild pain, Disp: , Rfl: 0    Alcohol Swabs 70 % PADS, May substitute brand based on insurance coverage  Check glucose BID  (Patient not taking: Reported on 10/10/2022), Disp: 100 each, Rfl: 0    Blood Glucose Monitoring Suppl (OneTouch Verio Reflect) w/Device KIT, May substitute brand based on insurance coverage  Check glucose BID  (Patient not taking: Reported on 10/10/2022), Disp: 1 kit, Rfl: 0    Denta 5000 Plus 1 1 % CREA, USE TWICE DAILY   DO NOT RINSE (Patient not taking: Reported on 4/30/2023), Disp: , Rfl:     dicyclomine (BENTYL) 20 mg tablet, Take 1 tablet (20 mg total) by mouth 2 (two) times a day as needed (bloating), Disp: 30 tablet, Rfl: 0    Current Allergies     Allergies as of 04/30/2023 - Reviewed 04/30/2023   Allergen Reaction Noted    Sulfa antibiotics Hives 06/25/2016            The following portions of the patient's history were reviewed and updated as appropriate: allergies, current medications, past family history, past medical history, past social history, past surgical history and problem list      Past Medical History:   Diagnosis Date    Seizures ([address] )        Past Surgical History:   Procedure Laterality Date    ANTERIOR CRUCIATE LIGAMENT REPAIR      CLOSED REDUCTION MANDIBLE Bilateral 6/26/2016    Procedure: CLOSED REDUCTION MANDIBLE;  Surgeon: Lloyd Ivey DMD;  [facility];  Service:     COLPOSCOPY W/ BIOPSY / Vola Stager  03/30/2015    COLP neg results /    Edna Sánchez, DIAGNOSTIC / THERAPEUTIC  06/2010    possible polyp    DISTAL PANCREATECTOMY  9/11/2022    Procedure: PANCREATECTOMY DISTAL;  Surgeon: Whit Cuellar To, DO;  [facility];  Service: General    [address] KNEE ARTHROSCOPY  2007    ACL repair    NOSE SURGERY  1996    revision    FL LAPS ABD PRTM&OMENTUM DX W/WO SPEC BR/WA SPX N/A 9/11/2022    Procedure: LAPAROSCOPY DIAGNOSTIC, CONVERTED TO OPEN;  Surgeon: Whit Hough DO;  [facility];  Service: General    SPLENECTOMY, TOTAL N/A 9/11/2022    Procedure: SPLENECTOMY;  Surgeon: Whit Hough DO;  [facility];  Service: General       Family History   Problem Relation Age of Onset    Kidney cancer Mother     Other Father         amyotrophic lateral sclerosis    Thyroid cancer Sister 40    Osteoporosis Maternal Grandmother     Tuberculosis Maternal Grandmother     Stroke Maternal Grandfather         stroke syndrome    Alzheimer's disease Paternal Grandmother     Alzheimer's disease Paternal Grandfather     No Known Problems Maternal Aunt     No Known Problems Paternal Aunt     Substance Abuse Neg Hx     Mental illness Neg Hx     BRCA2 Positive Neg Hx     BRCA2 Negative Neg Hx     BRCA 1/2 Neg Hx     BRCA1 Positive Neg Hx     BRCA1 Negative Neg Hx     Ovarian cancer Neg Hx     Endometrial cancer Neg Hx     Breast cancer additional onset Neg Hx     Colon cancer Neg Hx     Breast cancer Neg Hx          Medications have been verified  Objective   /65   Pulse 61   Temp 98 °F (36 7 °C)   Resp 18   SpO2 99%   No LMP recorded  Physical Exam     Physical Exam  Constitutional:       General: She is not in acute distress  Appearance: She is not ill-appearing  HENT:      Mouth/Throat:      Mouth: Mucous membranes are moist       Pharynx: No oropharyngeal exudate or posterior oropharyngeal erythema  Cardiovascular:      Rate and Rhythm: Normal rate and regular rhythm  Pulmonary:      Effort: Pulmonary effort is normal  No respiratory distress  Breath sounds: Normal breath sounds  No wheezing  Skin:     Findings: Rash (red excoriation surrounding her uppr and lower lip  dryness) present  Neurological:      Mental Status: She is alert

## 2023-05-10 DIAGNOSIS — K13.0 CRACKED LIPS: Primary | ICD-10-CM

## 2023-05-10 RX ORDER — PREDNISONE 20 MG/1
20 TABLET ORAL 2 TIMES DAILY WITH MEALS
Qty: 10 TABLET | Refills: 0 | Status: SHIPPED | OUTPATIENT
Start: 2023-05-10 | End: 2023-05-15
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
## 2023-05-16 RX ORDER — LANCETS 33 GAUGE
EACH MISCELLANEOUS
Qty: 100 EACH | Refills: 0 | Status: SHIPPED | OUTPATIENT
Start: 2023-05-16

## 2023-05-16 RX ORDER — BLOOD SUGAR DIAGNOSTIC
STRIP MISCELLANEOUS
Qty: 100 EACH | Refills: 0 | Status: SHIPPED | OUTPATIENT
Start: 2023-05-16

## 2023-06-28 ENCOUNTER — APPOINTMENT (OUTPATIENT)
Dept: LAB | Facility: CLINIC | Age: 43
End: 2023-06-28
Payer: COMMERCIAL

## 2023-06-28 DIAGNOSIS — R73.03 PREDIABETES: ICD-10-CM

## 2023-06-28 LAB
EST. AVERAGE GLUCOSE BLD GHB EST-MCNC: 126 MG/DL
HBA1C MFR BLD: 6 %

## 2023-06-28 PROCEDURE — 36415 COLL VENOUS BLD VENIPUNCTURE: CPT

## 2023-06-28 PROCEDURE — 83036 HEMOGLOBIN GLYCOSYLATED A1C: CPT
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
## 2023-07-06 RX ORDER — BLOOD SUGAR DIAGNOSTIC
STRIP MISCELLANEOUS
Qty: 100 STRIP | Refills: 3 | Status: SHIPPED | OUTPATIENT
Start: 2023-07-06

## 2023-09-16 ENCOUNTER — HOSPITAL ENCOUNTER (EMERGENCY)
Facility: HOSPITAL | Age: 43
Discharge: HOME | End: 2023-09-16
Attending: EMERGENCY MEDICINE
Payer: COMMERCIAL

## 2023-09-16 VITALS
TEMPERATURE: 97.7 F | OXYGEN SATURATION: 99 % | DIASTOLIC BLOOD PRESSURE: 57 MMHG | HEART RATE: 58 BPM | SYSTOLIC BLOOD PRESSURE: 117 MMHG | RESPIRATION RATE: 17 BRPM

## 2023-09-16 DIAGNOSIS — R45.851 SUICIDAL IDEATION: Primary | ICD-10-CM

## 2023-09-16 LAB
AMPHETAMINES SERPL QL SCN: NEGATIVE
BARBITURATES UR QL: NEGATIVE
BENZODIAZ UR QL: NEGATIVE
COCAINE UR QL: NEGATIVE
ETHANOL EXG-MCNC: 0 MG/DL
EXT PREGNANCY TEST URINE: NEGATIVE
EXT. CONTROL: NORMAL
OPIATES UR QL SCN: NEGATIVE
OXYCODONE+OXYMORPHONE UR QL SCN: NEGATIVE
PCP UR QL: NEGATIVE
THC UR QL: NEGATIVE

## 2023-09-16 PROCEDURE — 81025 URINE PREGNANCY TEST: CPT

## 2023-09-16 PROCEDURE — 80307 DRUG TEST PRSMV CHEM ANLYZR: CPT

## 2023-09-16 PROCEDURE — 99283 EMERGENCY DEPT VISIT LOW MDM: CPT

## 2023-09-16 PROCEDURE — 99284 EMERGENCY DEPT VISIT MOD MDM: CPT | Performed by: EMERGENCY MEDICINE

## 2023-09-16 PROCEDURE — 82075 ASSAY OF BREATH ETHANOL: CPT

## 2023-09-16 NOTE — ED PROVIDER NOTES
Final Diagnoses:     1. Suicidal ideation        ED Course as of 09/17/23 1212   Sat Sep 16, 2023   1706 ED crisis to see. Clinically and medically cleared. Nursing Triage:     Chief Complaint   Patient presents with   • Psychiatric Evaluation     Pt recently broke up with significant other and started having "depressing thoughts". Pt admits to writing a note last night about "ending it all" stated "I don't want to be here anymore". Pt states her note was written to get her "frustrations" out about the break up. Family became concerned and called Ems. Pt stated she doesn't have hx of depression/anxiety     HPI:   This is a 38 y.o. female presenting for evaluation of SI. No relevant past medical history. Patient states that she was brought in by EMS because she broke up with her significant other 5 weeks ago and states that ever since that she has been depressed and made statements that she does not want to be here anymore but no specific statements about killing herself. Patient denies any plan. Patient denies ever having these thoughts before. Patient states that she simply depressed over the break-up. Patient denies any medical complaints at this time including chest pain shortness of breath nausea vomiting diarrhea abdominal pain dysuria. Denies SI, HI, hallucinations. Patient denies any substance use, EtOH, tobacco.  ASSESSMENT + PLAN:   We will do breath alcohol as well as UDS and U pride. Consult ED crisis. Medically clear at this time. Crisis provided patient with resources. Patient is cleared by crisis at this time will be discharged. Physical:   Pertinent: Unremarkable    General: VS reviewed,   Appears in NAD  awake, alert. Well-nourished, well-developed. Appears stated age. Speaking normally in full sentences. Head: Normocephalic, atraumatic  Eyes: EOM-I. No subconjunctival hemorrhages. Symmetrical lids. ENT: Atraumatic external nose and ears. MMM  No stridor. Normal phonation. No drooling. Normal swallowing. No erythema or exudates in mouth or posterior pharynx  Neck: No JVD. CV: Regular rate and rhythm, no murmurs rubs or gallops, no pallor noted  Lungs: No respiratory distress  Clear to ausculation bilaterally. No tachypnea  Abd: soft nt nd no rebound/guarding  MSK: Moves all extremities spontaneously  Skin: Dry, intact. Neuro: Awake, alert, GCS15, CN II-XII grossly intact. Psychiatric/Behavioral: interacting normally; appropriate mood/affect. Vitals:    09/16/23 1527   BP: 117/57   BP Location: Left arm   Pulse: 58   Resp: 17   Temp: 97.7 °F (36.5 °C)   TempSrc: Tympanic   SpO2: 99%     - There are no obvious limitations to social determinants of care. - Nursing note reviewed. - Vitals reviewed. - Orders placed by myself and/or advanced practitioner / resident.    - Previous chart was reviewed  - No language barrier.   - History obtained from patient. - There are no limitations to the history obtained:     Past Medical:    has a past medical history of Seizures ([address] Past Surgical:    has a past surgical history that includes Anterior cruciate ligament repair; CLOSED REDUCTION MANDIBLE (Bilateral, 6/26/2016); Colposcopy w/ biopsy / curettage (03/30/2015); Dilation and curettage, diagnostic / therapeutic (06/2010); Inguinal hernia repair (1980); Knee arthroscopy (2007); Nose surgery (1996); pr laps abd prtm&omentum dx w/wo spec br/wa spx (N/A, 9/11/2022); Splenectomy, total (N/A, 9/11/2022); and Distal pancreatectomy (9/11/2022). Social:     Social History     Substance and Sexual Activity   Alcohol Use Not Currently    Comment: former consumption excessive drinking     Social History     Tobacco Use   Smoking Status Never   Smokeless Tobacco Never     Social History     Substance and Sexual Activity   Drug Use Never       Echo:   No results found for this or any previous visit.     No results found for this or any previous visit. Cath:    No results found for this or any previous visit. Code Status: Prior  Advance Directive and Living Will:      Power of :    POLST:    Medications - No data to display  No orders to display     Orders Placed This Encounter   Procedures   • Rapid drug screen, urine   • POCT pregnancy, urine   • POCT alcohol breath test     Labs Reviewed   POCT PREGNANCY, URINE - Normal       Result Value Ref Range Status    EXT Preg Test, Ur Negative   Final    Control Valid   Final   POCT ALCOHOL BREATH TEST - Normal    EXTBreath Alcohol 0.000   Final   RAPID DRUG SCREEN, URINE    Amph/Meth UR Negative  Negative Final    Barbiturate Ur Negative  Negative Final    Benzodiazepine Urine Negative  Negative Final    Cocaine Urine Negative  Negative Final    Methadone Urine     Final    Comment: Test not performed in the lab if required call the lab. Opiate Urine Negative  Negative Final    PCP Ur Negative  Negative Final    THC Urine Negative  Negative Final    Oxycodone Urine Negative  Negative Final    Narrative:     FOR MEDICAL PURPOSES ONLY. IF CONFIRMATION NEEDED PLEASE CONTACT THE LAB WITHIN 5 DAYS. Drug Screen Cutoff Levels:  AMPHETAMINE/METHAMPHETAMINES  1000 ng/mL  BARBITURATES     200 ng/mL  BENZODIAZEPINES     200 ng/mL  COCAINE      300 ng/mL  METHADONE      300 ng/mL  OPIATES      300 ng/mL  PHENCYCLIDINE     25 ng/mL  THC       50 ng/mL  OXYCODONE      100 ng/mL     Time reflects when diagnosis was documented in both MDM as applicable and the Disposition within this note     Time User Action Codes Description Comment    9/16/2023  6:35 PM Saintclair Delton Add [U97.525] Suicidal ideation       ED Disposition     ED Disposition   Discharge    Condition   Stable    Date/Time   Sat Sep 16, 2023  6:35 PM    Comment   Gwen Patel discharge to home/self care.                Follow-up Information    None       Discharge Medication List as of 9/16/2023  6:35 PM      CONTINUE these medications which have NOT CHANGED    Details   acetaminophen (TYLENOL) 325 mg tablet Take 3 tablets (975 mg total) by mouth every 6 (six) hours as needed for mild pain, Starting Tue 10/4/2022, No Print      Alcohol Swabs 70 % PADS May substitute brand based on insurance coverage. Check glucose BID., Normal      Blood Glucose Monitoring Suppl (OneTouch Verio Reflect) w/Device KIT May substitute brand based on insurance coverage. Check glucose BID., Normal      Denta 5000 Plus 1.1 % CREA USE TWICE DAILY. DO NOT RINSE, Historical Med      dicyclomine (BENTYL) 20 mg tablet Take 1 tablet (20 mg total) by mouth 2 (two) times a day as needed (bloating), Starting Sat 11/5/2022, Normal      glucose blood (OneTouch Verio) test strip MAY SUBSTITUTE BRAND BASED ON INSURANCE COVERAGE. CHECK GLUCOSE TWICE A DAY ., Normal      OneTouch Delica Lancets 96C MISC May substitute brand based on insurance coverage. Check glucose BID., Normal           No discharge procedures on file. Prior to Admission Medications   Prescriptions Last Dose Informant Patient Reported? Taking? Alcohol Swabs 70 % PADS   No No   Sig: May substitute brand based on insurance coverage. Check glucose BID. Patient not taking: Reported on 10/10/2022   Blood Glucose Monitoring Suppl (OneTouch Verio Reflect) w/Device KIT   No No   Sig: May substitute brand based on insurance coverage. Check glucose BID. Patient not taking: Reported on 10/10/2022   Denta 5000 Plus 1.1 % CREA   Yes No   Sig: USE TWICE DAILY. DO NOT RINSE   Patient not taking: Reported on 5/42/9413   OneTouch Delica Lancets 66H MISC   No No   Sig: May substitute brand based on insurance coverage.  Check glucose BID.   acetaminophen (TYLENOL) 325 mg tablet   No No   Sig: Take 3 tablets (975 mg total) by mouth every 6 (six) hours as needed for mild pain   dicyclomine (BENTYL) 20 mg tablet   No No   Sig: Take 1 tablet (20 mg total) by mouth 2 (two) times a day as needed (bloating)   glucose blood (OneTouch Verio) test strip   No No   Sig: MAY SUBSTITUTE BRAND BASED ON INSURANCE COVERAGE. CHECK GLUCOSE TWICE A DAY . Facility-Administered Medications: None                        Portions of the record may have been created with voice recognition software. Occasional wrong word or "sound a like" substitutions may have occurred due to the inherent limitations of voice recognition software. Read the chart carefully and recognize, using context, where substitutions have occurred.        Johnathan Renteria MD  09/17/23 5263

## 2023-09-16 NOTE — ED NOTES
Pt changed into paper scrubs, all belongings collected. RN witnessed pt changed into scrubs. No contraband found. Belongings inventoried. Pt tolerated process well.  Currently in bed with family at bedside quiet/calm      Britta Andrews, [address]  09/16/23 3639

## 2023-09-16 NOTE — ED ATTENDING ATTESTATION
9/16/2023  IStone MD, saw and evaluated the patient. I have discussed the patient with the resident/non-physician practitioner and agree with the resident's/non-physician practitioner's findings, Plan of Care, and MDM as documented in the resident's/non-physician practitioner's note, except where noted. All available labs and Radiology studies were reviewed. I was present for key portions of any procedure(s) performed by the resident/non-physician practitioner and I was immediately available to provide assistance. At this point I agree with the current assessment done in the Emergency Department. I have conducted an independent evaluation of this patient a history and physical is as follows:   The patient complains of feeling depressed and anxious ever since she broke up with her significant other 5 weeks ago no significant plans to hurt herself she has just been very depressed no access to firearms  Not  Hallucinating  Uses alcohol on a regular basis no drug use  Well-appearing no acute distress vital signs stable afebrile lungs clear heart regular abdomen soft nontender neurologic exam nonfocal  Patient is depressed but no active plans of hurting self    ED Course         Critical Care Time  Procedures

## 2023-09-17 NOTE — ED NOTES
38 female upset over a break up. Chief complaint of anxiety and depression. Slight disturbance to ADLS and eating/sleeping. Denies current SI/HI/AH/VH. No evidence of psychosis. Patient is accompanied by sister who is concerned as well. Patient is alert and oriented times four with flat affect and states her major stressor is the break up of two years. There is no evidence of a plan to harm herself. Patient denies previous psychiatric stays, therapy, psychiatry, medical problems, psych diagnosis, medications. Denies substance abuse, legal issues, access to weapons. Discussed treatment options with patient. Patient most interested in outpatient therapy. Patient advised of returning to ED if symptoms worsen. Patient was counseled on outpatient resources and wanted to go home.

## 2023-09-21 ENCOUNTER — OFFICE VISIT (OUTPATIENT)
Dept: FAMILY MEDICINE CLINIC | Facility: CLINIC | Age: 43
End: 2023-09-21
Payer: COMMERCIAL

## 2023-09-21 VITALS
DIASTOLIC BLOOD PRESSURE: 60 MMHG | SYSTOLIC BLOOD PRESSURE: 98 MMHG | BODY MASS INDEX: 19.94 KG/M2 | TEMPERATURE: 97.2 F | HEART RATE: 64 BPM | OXYGEN SATURATION: 100 % | HEIGHT: 64 IN | RESPIRATION RATE: 15 BRPM | WEIGHT: 116.8 LBS

## 2023-09-21 DIAGNOSIS — F43.21 ADJUSTMENT DISORDER WITH DEPRESSED MOOD: Primary | ICD-10-CM

## 2023-09-21 PROCEDURE — 99214 OFFICE O/P EST MOD 30 MIN: CPT | Performed by: NURSE PRACTITIONER

## 2023-09-21 RX ORDER — SERTRALINE HYDROCHLORIDE 25 MG/1
25 TABLET, FILM COATED ORAL DAILY
Qty: 30 TABLET | Refills: 1 | Status: SHIPPED | OUTPATIENT
Start: 2023-09-21 | End: 2024-03-19

## 2023-09-21 NOTE — PROGRESS NOTES
Assessment/Plan:     Diagnoses and all orders for this visit:    Adjustment disorder with depressed mood  -     sertraline (ZOLOFT) 25 mg tablet; Take 1 tablet (25 mg total) by mouth daily      Zoloft 25 mg prescribed since patient is medication naive. Medication and s/e reviewed. We did review that there is a small chance (mostly in teens and young adults) that an SSRI can make the patient feel worse and worsen SI. Pt encouraged that if this occurs to go right to ER. Pt does have counseling session today. We will plan to see her back in the office in 6 weeks for medication recheck and annual physical or sooner PRN. Patient is encouraged to call our office for any questions/concerns, persistent or worsening symptoms. Patient states they understand and agree with treatment plan. Pt to f/u PRN. Subjective:      Patient ID: Berenice Mendes is a 38 y.o. female. Pt here today for [place] paperwork to be completed for depression. She notes several weeks ago she had gone through a break up and since then has been feeling down, depressed and struggling to get up in the morning and go to work. She works as an OT for rehab. Pt was seen in ER on 09/16 after family urged her to go d/t suicidal ideation. She was linked w/ a crisis counselor and had her first session today. She is open to antidepressants. She has never taken any antidepressants in the past.  Today, pt notes she does not have any SI or thoughts of harming herself, nor means to do so. She admits she is aware that if these thoughts would recur to go to ER. The following portions of the patient's history were reviewed and updated as appropriate: allergies, current medications, past family history, past medical history, past social history, past surgical history and problem list.    Review of Systems    As noted per HPI.     Objective:      BP 98/60   Pulse 64   Temp (!) 97.2 °F (36.2 °C) (Oral)   Resp 15   Ht 5' 4.25" (1.632 m)   Wt 53 kg (116 lb 12.8 oz)   SpO2 100%   BMI 19.89 kg/m²          Physical Exam  Vitals reviewed. Constitutional:       Appearance: Normal appearance. Pulmonary:      Effort: Pulmonary effort is normal.   Neurological:      Mental Status: She is alert and oriented to person, place, and time. Mental status is at baseline. Psychiatric:         Attention and Perception: Attention normal.         Mood and Affect: Affect is flat. Speech: Speech normal.         Behavior: Behavior normal.         Thought Content: Thought content normal. Thought content does not include homicidal or suicidal ideation. Thought content does not include homicidal or suicidal plan. Cognition and Memory: Cognition normal.         Judgment: Judgment normal.             Depression Screening and Follow-up Plan: Patient's depression screening was positive with a PHQ-2 score of 6. Their PHQ-9 score was 17. Patient assessed for underlying major depression. Brief counseling provided and recommend additional follow-up/re-evaluation next office visit. Continue regular follow-up with their mental health provider who is managing their mental health condition(s).

## 2023-10-26 ENCOUNTER — OFFICE VISIT (OUTPATIENT)
Dept: FAMILY MEDICINE CLINIC | Facility: CLINIC | Age: 43
End: 2023-10-26
Payer: COMMERCIAL

## 2023-10-26 VITALS
DIASTOLIC BLOOD PRESSURE: 60 MMHG | WEIGHT: 123 LBS | HEART RATE: 84 BPM | HEIGHT: 64 IN | TEMPERATURE: 98.4 F | OXYGEN SATURATION: 97 % | SYSTOLIC BLOOD PRESSURE: 100 MMHG | RESPIRATION RATE: 15 BRPM | BODY MASS INDEX: 21 KG/M2

## 2023-10-26 DIAGNOSIS — F43.23 ADJUSTMENT DISORDER WITH MIXED ANXIETY AND DEPRESSED MOOD: ICD-10-CM

## 2023-10-26 DIAGNOSIS — Z13.6 SCREENING FOR CARDIOVASCULAR CONDITION: ICD-10-CM

## 2023-10-26 DIAGNOSIS — Z13.29 SCREENING FOR THYROID DISORDER: ICD-10-CM

## 2023-10-26 DIAGNOSIS — R73.09 ELEVATED HEMOGLOBIN A1C: ICD-10-CM

## 2023-10-26 DIAGNOSIS — Z13.228 SCREENING FOR METABOLIC DISORDER: ICD-10-CM

## 2023-10-26 DIAGNOSIS — E55.9 VITAMIN D DEFICIENCY: ICD-10-CM

## 2023-10-26 DIAGNOSIS — Z00.00 ANNUAL PHYSICAL EXAM: Primary | ICD-10-CM

## 2023-10-26 DIAGNOSIS — Z13.89 SCREENING FOR BLOOD OR PROTEIN IN URINE: ICD-10-CM

## 2023-10-26 DIAGNOSIS — Z13.220 SCREENING FOR LIPID DISORDERS: ICD-10-CM

## 2023-10-26 DIAGNOSIS — Z23 ENCOUNTER FOR IMMUNIZATION: ICD-10-CM

## 2023-10-26 PROCEDURE — 99396 PREV VISIT EST AGE 40-64: CPT | Performed by: NURSE PRACTITIONER

## 2023-10-26 PROCEDURE — 99213 OFFICE O/P EST LOW 20 MIN: CPT | Performed by: NURSE PRACTITIONER

## 2023-10-26 PROCEDURE — 90686 IIV4 VACC NO PRSV 0.5 ML IM: CPT

## 2023-10-26 PROCEDURE — 90471 IMMUNIZATION ADMIN: CPT

## 2023-10-26 NOTE — PROGRESS NOTES
ADULT ANNUAL PHYSICAL  [id number] [facility]    NAME: Winston Ontiveros  AGE: 38 y.o. SEX: female  : 1980     DATE: 10/26/2023     Assessment and Plan:  Flu vaccine today. Fasting labs ordered. F/u PRN. Problem List Items Addressed This Visit    None  Visit Diagnoses       Adjustment disorder with mixed anxiety and depressed mood    -  Primary    Encounter for immunization        Relevant Orders    influenza vaccine, quadrivalent, 0.5 mL, preservative-free, for adult and pediatric patients 6 mos+ (AFLURIA, FLUARIX, FLULAVAL, FLUZONE) (Completed)    Pancreatic injury, subsequent encounter        Vitamin D deficiency        Relevant Orders    Vitamin D 25 hydroxy    Screening for thyroid disorder        Relevant Orders    TSH, 3rd generation with Free T4 reflex    Screening for lipid disorders        Relevant Orders    Lipid panel    Screening for cardiovascular condition        Relevant Orders    CBC and differential    Screening for metabolic disorder        Relevant Orders    Comprehensive metabolic panel    Screening for blood or protein in urine        Relevant Orders    UA (URINE) with reflex to Scope    Elevated hemoglobin A1c        Relevant Orders    Hemoglobin A1C            Immunizations and preventive care screenings were discussed with patient today. Appropriate education was printed on patient's after visit summary. Counseling:  Alcohol/drug use: discussed moderation in alcohol intake, the recommendations for healthy alcohol use, and avoidance of illicit drug use. Dental Health: discussed importance of regular tooth brushing, flossing, and dental visits. Injury prevention: discussed safety/seat belts, safety helmets, smoke detectors, carbon dioxide detectors, and smoking near bedding or upholstery.   Sexual health: discussed sexually transmitted diseases, partner selection, use of condoms, avoidance of unintended pregnancy, and contraceptive alternatives. Exercise: the importance of regular exercise/physical activity was discussed. Recommend exercise 3-5 times per week for at least 30 minutes. Depression Screening and Follow-up Plan: Patient was screened for depression during today's encounter. They screened negative with a PHQ-2 score of 0. Return if symptoms worsen or fail to improve. Chief Complaint:     Chief Complaint   Patient presents with    fmla paperwork      History of Present Illness:     Adult Annual Physical   Patient here for a comprehensive physical exam. The patient reports no problems. Diet and Physical Activity  Diet/Nutrition: well balanced diet, consuming 3-5 servings of fruits/vegetables daily, and adequate fiber intake. Exercise: moderate cardiovascular exercise, 3-4 times a week on average, and 30-60 minutes on average. Depression Screening  PHQ-2/9 Depression Screening    Little interest or pleasure in doing things: 0 - not at all  Feeling down, depressed, or hopeless: 0 - not at all  PHQ-2 Score: 0  PHQ-2 Interpretation: Negative depression screen       General Health  Sleep: sleeps well and gets 7-8 hours of sleep on average. Hearing: normal - bilateral.  Vision: no vision problems and most recent eye exam >1 year ago. Dental: regular dental visits, brushes teeth twice daily, and flosses teeth occasionally. /GYN Health  Patient is: premenopausal  Last menstrual period: 10/10/2023  Contraceptive method:  none . Review of Systems:     Review of Systems   Constitutional: Negative. Negative for chills and fatigue. HENT: Negative. Respiratory: Negative. Negative for cough and shortness of breath. Cardiovascular: Negative. Negative for chest pain. Gastrointestinal: Negative. Genitourinary: Negative. Musculoskeletal: Negative. Negative for myalgias. Neurological: Negative.        Past Medical History:     Past Medical History: Diagnosis Date    Seizures ([address])       Past Surgical History:     Past Surgical History:   Procedure Laterality Date    ANTERIOR CRUCIATE LIGAMENT REPAIR      CLOSED REDUCTION MANDIBLE Bilateral 6/26/2016    Procedure: CLOSED REDUCTION MANDIBLE;  Surgeon: Kareem Green DMD;  Location: [facility];  Service:     COLPOSCOPY W/ BIOPSY / [facility]  03/30/2015    COLP neg results /     Katherine Rim, DIAGNOSTIC / THERAPEUTIC  06/2010    possible polyp    DISTAL PANCREATECTOMY  9/11/2022    Procedure: PANCREATECTOMY DISTAL;  Surgeon: Lora Hough DO;  Location: [facility];  Service: [address] [place] [address]    KNEE ARTHROSCOPY  2007    ACL repair    NOSE SURGERY  1996    revision    SC LAPS ABD PRTM&OMENTUM DX W/WO SPEC BR/WA SPX N/A 9/11/2022    Procedure: LAPAROSCOPY DIAGNOSTIC, CONVERTED TO OPEN;  Surgeon: Lora Hough DO;  Location: [facility];  Service: General    SPLENECTOMY, TOTAL N/A 9/11/2022    Procedure: SPLENECTOMY;  Surgeon: Lora Hough DO;  Location: [facility];  Service: General      Social History:     Social History     Socioeconomic History    Marital status: Single     Spouse name: None    Number of children: None    Years of education: None    Highest education level: None   Occupational History    None   Tobacco Use    Smoking status: Never    Smokeless tobacco: Never   Vaping Use    Vaping Use: Never used   Substance and Sexual Activity    Alcohol use: Not Currently     Comment: former consumption excessive drinking    Drug use: Never    Sexual activity: Yes     Partners: Female   Other Topics Concern    None   Social History Narrative    Daily caffeinated coffee consumption    Occasional caffeine consumption     Exercises moderately 3 or more times a week     Social Determinants of Health     Financial Resource Strain: Not on file   Food Insecurity: No Food Insecurity (9/29/2022)    Hunger Vital Sign     Worried About Running Out of Food in the Last Year: Never true Ran Out of Food in the Last Year: Never true   Transportation Needs: No Transportation Needs (9/29/2022)    PRAPARE - Transportation     Lack of Transportation (Medical): No     Lack of Transportation (Non-Medical): No   Physical Activity: Not on file   Stress: Not on file   Social Connections: Not on file   Intimate Partner Violence: Not on file   Housing Stability: Low Risk  (9/29/2022)    Housing Stability Vital Sign     Unable to Pay for Housing in the Last Year: No     Number of Places Lived in the Last Year: 1     Unstable Housing in the Last Year: No      Family History:     Family History   Problem Relation Age of Onset    Kidney cancer Mother     Other Father         amyotrophic lateral sclerosis    Thyroid cancer Sister 40    Osteoporosis Maternal Grandmother     Tuberculosis Maternal Grandmother     Stroke Maternal Grandfather         stroke syndrome    Alzheimer's disease Paternal Grandmother     Alzheimer's disease Paternal Grandfather     No Known Problems Maternal Aunt     No Known Problems Paternal Aunt     Substance Abuse Neg Hx     Mental illness Neg Hx     BRCA2 Positive Neg Hx     BRCA2 Negative Neg Hx     BRCA 1/2 Neg Hx     BRCA1 Positive Neg Hx     BRCA1 Negative Neg Hx     Ovarian cancer Neg Hx     Endometrial cancer Neg Hx     Breast cancer additional onset Neg Hx     Colon cancer Neg Hx     Breast cancer Neg Hx       Current Medications:     Current Outpatient Medications   Medication Sig Dispense Refill    Blood Glucose Monitoring Suppl (OneTouch Verio Reflect) w/Device KIT May substitute brand based on insurance coverage. Check glucose BID. 1 kit 0    glucose blood (OneTouch Verio) test strip MAY SUBSTITUTE BRAND BASED ON INSURANCE COVERAGE. CHECK GLUCOSE TWICE A DAY . 100 strip 3    OneTouch Delica Lancets 71S MISC May substitute brand based on insurance coverage. Check glucose BID. 100 each 0     No current facility-administered medications for this visit.       Allergies: Allergies   Allergen Reactions    Sulfa Antibiotics Hives      Physical Exam:     /60   Pulse 84   Temp 98.4 °F (36.9 °C) (Oral)   Resp 15   Ht 5' 4.25" (1.632 m)   Wt 55.8 kg (123 lb)   SpO2 97%   BMI 20.95 kg/m²     Physical Exam  Vitals and nursing note reviewed. Constitutional:       General: She is not in acute distress. Appearance: Normal appearance. She is well-developed. She is not ill-appearing. HENT:      Head: Normocephalic and atraumatic. Right Ear: Tympanic membrane, ear canal and external ear normal.      Left Ear: Tympanic membrane, ear canal and external ear normal.   Eyes:      Conjunctiva/sclera: Conjunctivae normal.   Neck:      Vascular: No carotid bruit. Cardiovascular:      Rate and Rhythm: Normal rate and regular rhythm. Pulses: Normal pulses. Heart sounds: Normal heart sounds. No murmur heard. Pulmonary:      Effort: Pulmonary effort is normal. No respiratory distress. Breath sounds: Normal breath sounds. No wheezing. Abdominal:      General: There is no distension. Palpations: Abdomen is soft. There is no mass. Tenderness: There is no abdominal tenderness. There is no guarding or rebound. Hernia: No hernia is present. Musculoskeletal:         General: Normal range of motion. Cervical back: Normal range of motion and neck supple. Right lower leg: No edema. Left lower leg: No edema. Skin:     General: Skin is warm and dry. Capillary Refill: Capillary refill takes less than 2 seconds. Neurological:      Mental Status: She is alert and oriented to person, place, and time. Mental status is at baseline. Motor: No weakness. Gait: Gait normal.   Psychiatric:         Mood and Affect: Mood normal.         Behavior: Behavior normal.         Thought Content:  Thought content normal.         Judgment: Judgment normal.          Angi Mills, [address]

## 2023-10-26 NOTE — PROGRESS NOTES
Assessment/Plan:     Diagnoses and all orders for this visit:    Adjustment disorder with mixed anxiety and depressed mood    Pt encouraged to continue w/ her therapist weekly. FMLA paperwork completed w/ RTW date of 11/01/2023. Encounter for immunization  -     influenza vaccine, quadrivalent, 0.5 mL, preservative-free, for adult and pediatric patients 6 mos+ (AFLURIA, FLUARIX, FLULAVAL, FLUZONE)    Vitamin D deficiency  -     Vitamin D 25 hydroxy; Future    Screening for thyroid disorder  -     TSH, 3rd generation with Free T4 reflex; Future    Screening for lipid disorders  -     Lipid panel; Future    Screening for cardiovascular condition  -     CBC and differential; Future    Screening for metabolic disorder  -     Comprehensive metabolic panel; Future    Screening for blood or protein in urine  -     UA (URINE) with reflex to Scope    Elevated hemoglobin A1c  -     Hemoglobin A1C; Future          Pt to f/u PRN    Subjective:      Patient ID: Johanny Guerrero is a 38 y.o. female. Pt presents today for FMLA paperwork for anxiety and depression. She is currently seeing a therapist weekly. Pt will RTW 11/01/2023. The following portions of the patient's history were reviewed and updated as appropriate: allergies, current medications, past family history, past medical history, past social history, past surgical history, and problem list.    Review of Systems    As noted per HPI. Objective:      /60   Pulse 84   Temp 98.4 °F (36.9 °C) (Oral)   Resp 15   Ht 5' 4.25" (1.632 m)   Wt 55.8 kg (123 lb)   SpO2 97%   BMI 20.95 kg/m²          Physical Exam  Vitals reviewed. Constitutional:       Appearance: Normal appearance. HENT:      Head: Normocephalic. Cardiovascular:      Rate and Rhythm: Normal rate and regular rhythm. Pulses: Normal pulses. Heart sounds: Normal heart sounds.    Pulmonary:      Effort: Pulmonary effort is normal.      Breath sounds: Normal breath sounds. Abdominal:      General: Bowel sounds are normal.      Palpations: Abdomen is soft. Musculoskeletal:         General: Normal range of motion. Skin:     General: Skin is warm and dry. Neurological:      Mental Status: She is alert and oriented to person, place, and time. Mental status is at baseline.    Psychiatric:         Mood and Affect: Mood normal.         Behavior: Behavior normal.
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
## 2023-11-28 RX ORDER — LANCETS 33 GAUGE
EACH MISCELLANEOUS
Qty: 100 EACH | Refills: 0 | Status: SHIPPED | OUTPATIENT
Start: 2023-11-28

## 2023-11-28 RX ORDER — BLOOD SUGAR DIAGNOSTIC
STRIP MISCELLANEOUS
Qty: 100 STRIP | Refills: 0 | Status: SHIPPED | OUTPATIENT
Start: 2023-11-28

## 2023-12-13 ENCOUNTER — APPOINTMENT (OUTPATIENT)
Dept: LAB | Age: 43
End: 2023-12-13
Payer: COMMERCIAL

## 2023-12-13 DIAGNOSIS — Z13.228 SCREENING FOR METABOLIC DISORDER: ICD-10-CM

## 2023-12-13 DIAGNOSIS — E55.9 VITAMIN D DEFICIENCY: ICD-10-CM

## 2023-12-13 DIAGNOSIS — Z13.220 SCREENING FOR LIPID DISORDERS: ICD-10-CM

## 2023-12-13 DIAGNOSIS — Z13.6 SCREENING FOR CARDIOVASCULAR CONDITION: ICD-10-CM

## 2023-12-13 DIAGNOSIS — Z13.29 SCREENING FOR THYROID DISORDER: ICD-10-CM

## 2023-12-13 DIAGNOSIS — R73.09 ELEVATED HEMOGLOBIN A1C: ICD-10-CM

## 2023-12-13 LAB
25(OH)D3 SERPL-MCNC: 21.6 NG/ML (ref 30–100)
ALBUMIN SERPL BCP-MCNC: 4.5 G/DL (ref 3.5–5)
ALP SERPL-CCNC: 49 U/L (ref 34–104)
ALT SERPL W P-5'-P-CCNC: 9 U/L (ref 7–52)
ANION GAP SERPL CALCULATED.3IONS-SCNC: 5 MMOL/L
AST SERPL W P-5'-P-CCNC: 16 U/L (ref 13–39)
BACTERIA UR QL AUTO: ABNORMAL /HPF
BASOPHILS # BLD AUTO: 0.09 THOUSANDS/ÂΜL (ref 0–0.1)
BASOPHILS NFR BLD AUTO: 1 % (ref 0–1)
BILIRUB SERPL-MCNC: 1.11 MG/DL (ref 0.2–1)
BILIRUB UR QL STRIP: NEGATIVE
BUN SERPL-MCNC: 11 MG/DL (ref 5–25)
CALCIUM SERPL-MCNC: 9.4 MG/DL (ref 8.4–10.2)
CHLORIDE SERPL-SCNC: 103 MMOL/L (ref 96–108)
CHOLEST SERPL-MCNC: 177 MG/DL
CLARITY UR: ABNORMAL
CO2 SERPL-SCNC: 29 MMOL/L (ref 21–32)
COLOR UR: COLORLESS
CREAT SERPL-MCNC: 0.76 MG/DL (ref 0.6–1.3)
EOSINOPHIL # BLD AUTO: 0.34 THOUSAND/ÂΜL (ref 0–0.61)
EOSINOPHIL NFR BLD AUTO: 5 % (ref 0–6)
ERYTHROCYTE [DISTWIDTH] IN BLOOD BY AUTOMATED COUNT: 13.5 % (ref 11.6–15.1)
EST. AVERAGE GLUCOSE BLD GHB EST-MCNC: 134 MG/DL
GFR SERPL CREATININE-BSD FRML MDRD: 95 ML/MIN/1.73SQ M
GLUCOSE P FAST SERPL-MCNC: 105 MG/DL (ref 65–99)
GLUCOSE UR STRIP-MCNC: NEGATIVE MG/DL
HBA1C MFR BLD: 6.3 %
HCT VFR BLD AUTO: 40.9 % (ref 34.8–46.1)
HDLC SERPL-MCNC: 60 MG/DL
HGB BLD-MCNC: 13.6 G/DL (ref 11.5–15.4)
HGB UR QL STRIP.AUTO: NEGATIVE
IMM GRANULOCYTES # BLD AUTO: 0.02 THOUSAND/UL (ref 0–0.2)
IMM GRANULOCYTES NFR BLD AUTO: 0 % (ref 0–2)
KETONES UR STRIP-MCNC: NEGATIVE MG/DL
LDLC SERPL CALC-MCNC: 104 MG/DL (ref 0–100)
LEUKOCYTE ESTERASE UR QL STRIP: ABNORMAL
LYMPHOCYTES # BLD AUTO: 2.23 THOUSANDS/ÂΜL (ref 0.6–4.47)
LYMPHOCYTES NFR BLD AUTO: 30 % (ref 14–44)
MCH RBC QN AUTO: 31.5 PG (ref 26.8–34.3)
MCHC RBC AUTO-ENTMCNC: 33.3 G/DL (ref 31.4–37.4)
MCV RBC AUTO: 95 FL (ref 82–98)
MONOCYTES # BLD AUTO: 0.79 THOUSAND/ÂΜL (ref 0.17–1.22)
MONOCYTES NFR BLD AUTO: 11 % (ref 4–12)
NEUTROPHILS # BLD AUTO: 3.87 THOUSANDS/ÂΜL (ref 1.85–7.62)
NEUTS SEG NFR BLD AUTO: 53 % (ref 43–75)
NITRITE UR QL STRIP: NEGATIVE
NON-SQ EPI CELLS URNS QL MICRO: ABNORMAL /HPF
NONHDLC SERPL-MCNC: 117 MG/DL
NRBC BLD AUTO-RTO: 0 /100 WBCS
PH UR STRIP.AUTO: 6 [PH]
PLATELET # BLD AUTO: 428 THOUSANDS/UL (ref 149–390)
PMV BLD AUTO: 9.7 FL (ref 8.9–12.7)
POTASSIUM SERPL-SCNC: 4.4 MMOL/L (ref 3.5–5.3)
PROT SERPL-MCNC: 6.7 G/DL (ref 6.4–8.4)
PROT UR STRIP-MCNC: NEGATIVE MG/DL
RBC # BLD AUTO: 4.32 MILLION/UL (ref 3.81–5.12)
RBC #/AREA URNS AUTO: ABNORMAL /HPF
SODIUM SERPL-SCNC: 137 MMOL/L (ref 135–147)
SP GR UR STRIP.AUTO: 1.01 (ref 1–1.03)
TRIGL SERPL-MCNC: 67 MG/DL
TSH SERPL DL<=0.05 MIU/L-ACNC: 1.22 UIU/ML (ref 0.45–4.5)
UROBILINOGEN UR STRIP-ACNC: <2 MG/DL
WBC # BLD AUTO: 7.34 THOUSAND/UL (ref 4.31–10.16)
WBC #/AREA URNS AUTO: ABNORMAL /HPF

## 2023-12-13 PROCEDURE — 80061 LIPID PANEL: CPT

## 2023-12-13 PROCEDURE — 82306 VITAMIN D 25 HYDROXY: CPT

## 2023-12-13 PROCEDURE — 80053 COMPREHEN METABOLIC PANEL: CPT

## 2023-12-13 PROCEDURE — 36415 COLL VENOUS BLD VENIPUNCTURE: CPT

## 2023-12-13 PROCEDURE — 83036 HEMOGLOBIN GLYCOSYLATED A1C: CPT

## 2023-12-13 PROCEDURE — 85025 COMPLETE CBC W/AUTO DIFF WBC: CPT

## 2023-12-13 PROCEDURE — 84443 ASSAY THYROID STIM HORMONE: CPT

## 2023-12-14 DIAGNOSIS — E55.9 VITAMIN D DEFICIENCY: Primary | ICD-10-CM

## 2023-12-14 DIAGNOSIS — R73.09 ELEVATED HEMOGLOBIN A1C: ICD-10-CM

## 2024-02-19 DIAGNOSIS — Z12.31 ENCOUNTER FOR SCREENING MAMMOGRAM FOR MALIGNANT NEOPLASM OF BREAST: Primary | ICD-10-CM

## 2024-04-08 ENCOUNTER — HOSPITAL ENCOUNTER (OUTPATIENT)
Age: 44
Discharge: HOME | End: 2024-04-08
Payer: COMMERCIAL

## 2024-04-08 VITALS — BODY MASS INDEX: 21 KG/M2 | HEIGHT: 64 IN | WEIGHT: 123 LBS

## 2024-04-08 DIAGNOSIS — Z12.31 ENCOUNTER FOR SCREENING MAMMOGRAM FOR MALIGNANT NEOPLASM OF BREAST: ICD-10-CM

## 2024-04-08 PROCEDURE — 77063 BREAST TOMOSYNTHESIS BI: CPT

## 2024-04-08 PROCEDURE — 77067 SCR MAMMO BI INCL CAD: CPT

## 2024-06-28 ENCOUNTER — APPOINTMENT (OUTPATIENT)
Dept: LAB | Facility: CLINIC | Age: 44
End: 2024-06-28
Payer: COMMERCIAL

## 2024-06-28 DIAGNOSIS — R73.09 ELEVATED HEMOGLOBIN A1C: ICD-10-CM

## 2024-06-28 DIAGNOSIS — E55.9 VITAMIN D DEFICIENCY: ICD-10-CM

## 2024-06-28 LAB
25(OH)D3 SERPL-MCNC: 57 NG/ML (ref 30–100)
EST. AVERAGE GLUCOSE BLD GHB EST-MCNC: 128 MG/DL
HBA1C MFR BLD: 6.1 %

## 2024-06-28 PROCEDURE — 36415 COLL VENOUS BLD VENIPUNCTURE: CPT

## 2024-06-28 PROCEDURE — 82306 VITAMIN D 25 HYDROXY: CPT

## 2024-06-28 PROCEDURE — 83036 HEMOGLOBIN GLYCOSYLATED A1C: CPT

## 2024-07-01 DIAGNOSIS — Z13.89 SCREENING FOR BLOOD OR PROTEIN IN URINE: ICD-10-CM

## 2024-07-01 DIAGNOSIS — Z13.228 SCREENING FOR METABOLIC DISORDER: ICD-10-CM

## 2024-07-01 DIAGNOSIS — Z13.6 SCREENING FOR CARDIOVASCULAR CONDITION: ICD-10-CM

## 2024-07-01 DIAGNOSIS — Z13.29 SCREENING FOR THYROID DISORDER: ICD-10-CM

## 2024-07-01 DIAGNOSIS — E55.9 VITAMIN D DEFICIENCY: Primary | ICD-10-CM

## 2024-07-01 DIAGNOSIS — Z13.220 SCREENING FOR LIPID DISORDERS: ICD-10-CM

## 2024-07-01 DIAGNOSIS — R73.09 ELEVATED HEMOGLOBIN A1C: ICD-10-CM
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
## 2024-07-08 RX ORDER — LANCETS 33 GAUGE
EACH MISCELLANEOUS
Qty: 100 EACH | Refills: 5 | Status: SHIPPED | OUTPATIENT
Start: 2024-07-08

## 2024-07-08 RX ORDER — BLOOD SUGAR DIAGNOSTIC
STRIP MISCELLANEOUS
Qty: 100 STRIP | Refills: 5 | Status: SHIPPED | OUTPATIENT
Start: 2024-07-08

## 2024-07-19 DIAGNOSIS — Z12.4 CERVICAL CANCER SCREENING: Primary | ICD-10-CM

## 2024-10-30 ENCOUNTER — ANNUAL EXAM (OUTPATIENT)
Dept: OBGYN CLINIC | Facility: CLINIC | Age: 44
End: 2024-10-30
Payer: COMMERCIAL

## 2024-10-30 VITALS
BODY MASS INDEX: 21.51 KG/M2 | DIASTOLIC BLOOD PRESSURE: 60 MMHG | WEIGHT: 126 LBS | SYSTOLIC BLOOD PRESSURE: 118 MMHG | HEIGHT: 64 IN

## 2024-10-30 DIAGNOSIS — Z12.11 SCREENING FOR COLON CANCER: ICD-10-CM

## 2024-10-30 DIAGNOSIS — Z12.31 ENCOUNTER FOR SCREENING MAMMOGRAM FOR MALIGNANT NEOPLASM OF BREAST: ICD-10-CM

## 2024-10-30 DIAGNOSIS — Z12.4 CERVICAL CANCER SCREENING: ICD-10-CM

## 2024-10-30 DIAGNOSIS — Z01.419 ENCNTR FOR GYN EXAM (GENERAL) (ROUTINE) W/O ABN FINDINGS: Primary | ICD-10-CM

## 2024-10-30 PROCEDURE — 99396 PREV VISIT EST AGE 40-64: CPT | Performed by: OBSTETRICS & GYNECOLOGY

## 2024-10-30 RX ORDER — VITAMIN K2 90 MCG
CAPSULE ORAL
COMMUNITY

## 2024-10-30 NOTE — PROGRESS NOTES
Lisa CABA Dawson  1980      CC:  Yearly exam    S:  43 y.o. female here for yearly exam. She was previously a patient of Nenita Tinsley.      Her cycles are regular, not heavy or crampy.      Sexual activity: She is sexually active without pain, bleeding or dryness.     Contraception: N/A (female partner)     Last Pap 2/19/2021 - normal/negative HPV  Last Mammo 4/8/2024 - BIRAD-1  Last colonoscopy - never    We reviewed ASCCP guidelines for Pap testing today.       Current Outpatient Medications:     Blood Glucose Monitoring Suppl (OneTouch Verio Reflect) w/Device KIT, May substitute brand based on insurance coverage. Check glucose BID., Disp: 1 kit, Rfl: 0    Cholecalciferol (Vitamin D3) 1000 units CAPS, Take by mouth, Disp: , Rfl:     glucose blood (OneTouch Verio) test strip, MAY SUBSTITUTE BRAND BASED ON INSURANCE COVERAGE. CHECK GLUCOSE TWICE A DAY ., Disp: 100 strip, Rfl: 5    OneTouch Delica Lancets 33G MISC, May substitute brand based on insurance coverage. Check glucose BID., Disp: 100 each, Rfl: 5  Social History     Socioeconomic History    Marital status: Single     Spouse name: Not on file    Number of children: Not on file    Years of education: Not on file    Highest education level: Not on file   Occupational History    Not on file   Tobacco Use    Smoking status: Never    Smokeless tobacco: Never   Vaping Use    Vaping status: Never Used   Substance and Sexual Activity    Alcohol use: Not Currently     Comment: former consumption excessive drinking    Drug use: Never    Sexual activity: Yes     Partners: Female     Birth control/protection: None   Other Topics Concern    Not on file   Social History Narrative    Daily caffeinated coffee consumption    Occasional caffeine consumption     Exercises moderately 3 or more times a week     Social Determinants of Health     Financial Resource Strain: Not on file   Food Insecurity: No Food Insecurity (9/29/2022)    Hunger Vital Sign     Worried About  Running Out of Food in the Last Year: Never true     Ran Out of Food in the Last Year: Never true   Transportation Needs: No Transportation Needs (9/29/2022)    PRAPARE - Transportation     Lack of Transportation (Medical): No     Lack of Transportation (Non-Medical): No   Physical Activity: Not on file   Stress: Not on file   Social Connections: Unknown (6/18/2024)    Received from "[employer]    Social [employer]     How often do you feel lonely or isolated from those around you? (Adult - for ages 18 years and over): Not on file   Intimate Partner Violence: Not on file   Housing Stability: Low Risk  (9/29/2022)    Housing Stability Vital Sign     Unable to Pay for Housing in the Last Year: No     Number of Places Lived in the Last Year: 1     Unstable Housing in the Last Year: No     Family History   Problem Relation Age of Onset    Kidney cancer Mother     ALS Father         amyotrophic lateral sclerosis    Thyroid cancer Sister 44    Osteoporosis Maternal Grandmother     Tuberculosis Maternal Grandmother     Stroke Maternal Grandfather         stroke syndrome    Alzheimer's disease Paternal Grandmother     Alzheimer's disease Paternal Grandfather     No Known Problems Maternal Aunt     No Known Problems Paternal Aunt     Substance Abuse Neg Hx     Mental illness Neg Hx     BRCA2 Positive Neg Hx     BRCA2 Negative Neg Hx     BRCA 1/2 Neg Hx     BRCA1 Positive Neg Hx     BRCA1 Negative Neg Hx     Ovarian cancer Neg Hx     Endometrial cancer Neg Hx     Breast cancer additional onset Neg Hx     Colon cancer Neg Hx     Breast cancer Neg Hx     Other Neg Hx       Past Medical History:   Diagnosis Date    Seizures (HCC)     Substance abuse (HCC) Ended 01/2018        Review of Systems   Respiratory: Negative.    Cardiovascular: Negative.    Gastrointestinal: Negative for constipation and diarrhea.   Genitourinary: Negative for difficulty urinating, pelvic pain, vaginal bleeding, vaginal discharge, itching or  "odor.    O:  Blood pressure 118/60, height 5' 4\" (1.626 m), weight 57.2 kg (126 lb), last menstrual period 10/10/2024, not currently breastfeeding.    Patient appears well and is not in distress  Neck is supple without masses  Breasts are symmetrical without mass, tenderness, nipple discharge, skin changes or adenopathy.   Abdomen is soft and nontender without masses.   External genitals are normal without lesions or rashes.  Urethral meatus and urethra are normal  Bladder is normal to palpation  Vagina is normal without discharge or bleeding.   Cervix is normal without discharge or lesion. +cervical polyp - asymptomatic; patient made aware  Uterus is normal, mobile, nontender without palpable mass.  Adnexa are normal, nontender, without palpable mass.     A:   Yearly exam.     P:   Pap due 2026   Mammo ordered   Colon cancer screening recommended    RTO one year for yearly exam or sooner as needed.     "

## 2024-12-03 ENCOUNTER — RESULTS FOLLOW-UP (OUTPATIENT)
Dept: OBGYN CLINIC | Facility: CLINIC | Age: 44
End: 2024-12-03

## 2024-12-03 LAB — COLOGUARD RESULT REPORTABLE: NEGATIVE

## 2025-02-12 ENCOUNTER — OFFICE VISIT (OUTPATIENT)
Dept: FAMILY MEDICINE CLINIC | Facility: CLINIC | Age: 45
End: 2025-02-12
Payer: COMMERCIAL

## 2025-02-12 VITALS
WEIGHT: 128.1 LBS | BODY MASS INDEX: 21.87 KG/M2 | HEIGHT: 64 IN | SYSTOLIC BLOOD PRESSURE: 102 MMHG | TEMPERATURE: 98 F | HEART RATE: 69 BPM | DIASTOLIC BLOOD PRESSURE: 70 MMHG | RESPIRATION RATE: 14 BRPM | OXYGEN SATURATION: 98 %

## 2025-02-12 DIAGNOSIS — Z12.31 ENCOUNTER FOR SCREENING MAMMOGRAM FOR MALIGNANT NEOPLASM OF BREAST: ICD-10-CM

## 2025-02-12 DIAGNOSIS — E55.9 VITAMIN D DEFICIENCY: ICD-10-CM

## 2025-02-12 DIAGNOSIS — Z13.29 SCREENING FOR THYROID DISORDER: ICD-10-CM

## 2025-02-12 DIAGNOSIS — Z13.220 SCREENING FOR LIPID DISORDERS: ICD-10-CM

## 2025-02-12 DIAGNOSIS — Z13.6 SCREENING FOR CARDIOVASCULAR CONDITION: ICD-10-CM

## 2025-02-12 DIAGNOSIS — Z13.89 SCREENING FOR BLOOD OR PROTEIN IN URINE: ICD-10-CM

## 2025-02-12 DIAGNOSIS — Z00.00 ANNUAL PHYSICAL EXAM: Primary | ICD-10-CM

## 2025-02-12 DIAGNOSIS — R73.09 ELEVATED HEMOGLOBIN A1C: ICD-10-CM

## 2025-02-12 DIAGNOSIS — Z13.228 SCREENING FOR METABOLIC DISORDER: ICD-10-CM

## 2025-02-12 PROCEDURE — 99396 PREV VISIT EST AGE 40-64: CPT | Performed by: NURSE PRACTITIONER

## 2025-02-12 NOTE — PROGRESS NOTES
Adult Annual Physical  Name: Lisa Osman      : 1980      MRN: [id number]  Encounter Provider: JUAN Eid  Encounter Date: 2025   Encounter department: [facility]    Immunizations UTD. Pt made aware her TDAP is due 2025 and a booster of her Meningitis B vaccine will be due 2025 d/t her hx of splenectomy.  Labs ordered.  Mammogram UTD.  PAP UTD and w/ GYN.  Cologuard UTD.  F/u in 1 year for annual physical or sooner PRN.  Assessment & Plan  Annual physical exam         Vitamin D deficiency    Orders:    Vitamin D 25 hydroxy; Future    Elevated hemoglobin A1c    Orders:    Hemoglobin A1C; Future    Screening for lipid disorders    Orders:    Lipid panel; Future    Screening for blood or protein in urine    Orders:    UA (URINE) with reflex to Scope; Future    Screening for thyroid disorder    Orders:    CBC and differential; Future    TSH, 3rd generation with Free T4 reflex; Future    Screening for cardiovascular condition         Screening for metabolic disorder    Orders:    Comprehensive metabolic panel; Future    Encounter for screening mammogram for malignant neoplasm of breast         Immunizations and preventive care screenings were discussed with patient today. Appropriate education was printed on patient's after visit summary.    Counseling:  Alcohol/drug use: discussed moderation in alcohol intake, the recommendations for healthy alcohol use, and avoidance of illicit drug use.  Dental Health: discussed importance of regular tooth brushing, flossing, and dental visits.  Injury prevention: discussed safety/seat belts, safety helmets, smoke detectors, carbon monoxide detectors, and smoking near bedding or upholstery.  Sexual health: discussed sexually transmitted diseases, partner selection, use of condoms, avoidance of unintended pregnancy, and contraceptive alternatives.  Exercise: the importance of regular  "exercise/physical activity was discussed. Recommend exercise 3-5 times per week for at least 30 minutes.       Depression Screening and Follow-up Plan: Patient was screened for depression during today's encounter. They screened negative with a PHQ-2 score of 0.          History of Present Illness     Adult Annual Physical:  Patient presents for annual physical. Pt here today for her annual physical.  Overall she is feeling well.  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  She is keeping active w/ biking and soccer..     Diet and Physical Activity:  - Diet/Nutrition: well balanced diet, consuming 3-5 servings of fruits/vegetables daily and adequate fiber intake.  - Exercise: moderate cardiovascular exercise and 5-7 times a week on average.    Depression Screening:  - PHQ-2 Score: 0    General Health:  - Sleep: 7-8 hours of sleep on average and sleeps well.  - Hearing: normal hearing bilateral ears.  - Vision: goes for regular eye exams.  - Dental: brushes teeth twice daily and regular dental visits.    /GYN Health:  - Follows with GYN: yes.   - Menopause: premenopausal.   - Last menstrual cycle: 2/2/2025.     Review of Systems   Constitutional: Negative.  Negative for chills and fatigue.   HENT: Negative.     Respiratory: Negative.  Negative for cough and shortness of breath.    Cardiovascular: Negative.  Negative for chest pain.   Gastrointestinal: Negative.    Genitourinary: Negative.    Musculoskeletal: Negative.  Negative for myalgias.   Neurological: Negative.          Objective   /70   Pulse 69   Temp 98 °F (36.7 °C)   Resp 14   Ht 5' 4.25\" (1.632 m)   Wt 58.1 kg (128 lb 1.6 oz)   LMP 02/02/2025   SpO2 98%   BMI 21.82 kg/m²     Physical Exam  Vitals and nursing note reviewed.   Constitutional:       General: She is not in acute distress.     Appearance: Normal appearance. She is well-developed. She is not ill-appearing.   HENT:      Head: Normocephalic and atraumatic.    "   Right Ear: Tympanic membrane, ear canal and external ear normal.      Left Ear: Tympanic membrane, ear canal and external ear normal.   Eyes:      Conjunctiva/sclera: Conjunctivae normal.   Neck:      Vascular: No carotid bruit.   Cardiovascular:      Rate and Rhythm: Normal rate and regular rhythm.      Pulses: Normal pulses.      Heart sounds: Normal heart sounds. No murmur heard.  Pulmonary:      Effort: Pulmonary effort is normal. No respiratory distress.      Breath sounds: Normal breath sounds. No wheezing.   Abdominal:      General: There is no distension.      Palpations: Abdomen is soft. There is no mass.      Tenderness: There is no abdominal tenderness. There is no guarding or rebound.      Hernia: No hernia is present.   Musculoskeletal:         General: Normal range of motion.      Cervical back: Normal range of motion and neck supple.      Right lower leg: No edema.      Left lower leg: No edema.   Skin:     General: Skin is warm and dry.      Capillary Refill: Capillary refill takes less than 2 seconds.   Neurological:      Mental Status: She is alert and oriented to person, place, and time. Mental status is at baseline.      Motor: No weakness.      Gait: Gait normal.   Psychiatric:         Mood and Affect: Mood normal.         Behavior: Behavior normal.         Thought Content: Thought content normal.         Judgment: Judgment normal.

## 2025-02-14 ENCOUNTER — RESULTS FOLLOW-UP (OUTPATIENT)
Dept: FAMILY MEDICINE CLINIC | Facility: CLINIC | Age: 45
End: 2025-02-14

## 2025-02-14 DIAGNOSIS — R73.09 ELEVATED HEMOGLOBIN A1C: Primary | ICD-10-CM

## 2025-02-14 LAB
25(OH)D3+25(OH)D2 SERPL-MCNC: 51.1 NG/ML (ref 30–100)
ALBUMIN SERPL-MCNC: 4.6 G/DL (ref 3.9–4.9)
ALP SERPL-CCNC: 63 IU/L (ref 44–121)
ALT SERPL-CCNC: 11 IU/L (ref 0–32)
APPEARANCE UR: CLEAR
AST SERPL-CCNC: 15 IU/L (ref 0–40)
BACTERIA URNS QL MICRO: NORMAL
BASOPHILS # BLD AUTO: 0.1 X10E3/UL (ref 0–0.2)
BASOPHILS NFR BLD AUTO: 1 %
BILIRUB SERPL-MCNC: 0.9 MG/DL (ref 0–1.2)
BILIRUB UR QL STRIP: NEGATIVE
BUN SERPL-MCNC: 16 MG/DL (ref 6–24)
BUN/CREAT SERPL: 23 (ref 9–23)
CALCIUM SERPL-MCNC: 9.2 MG/DL (ref 8.7–10.2)
CASTS URNS QL MICRO: NORMAL /LPF
CHLORIDE SERPL-SCNC: 99 MMOL/L (ref 96–106)
CHOLEST SERPL-MCNC: 167 MG/DL (ref 100–199)
CHOLEST/HDLC SERPL: 2.7 RATIO (ref 0–4.4)
CO2 SERPL-SCNC: 23 MMOL/L (ref 20–29)
COLOR UR: YELLOW
CREAT SERPL-MCNC: 0.71 MG/DL (ref 0.57–1)
EGFR: 106 ML/MIN/1.73
EOSINOPHIL # BLD AUTO: 0.4 X10E3/UL (ref 0–0.4)
EOSINOPHIL NFR BLD AUTO: 4 %
EPI CELLS #/AREA URNS HPF: NORMAL /HPF (ref 0–10)
ERYTHROCYTE [DISTWIDTH] IN BLOOD BY AUTOMATED COUNT: 12.4 % (ref 11.7–15.4)
EST. AVERAGE GLUCOSE BLD GHB EST-MCNC: 131 MG/DL
GLOBULIN SER-MCNC: 2.2 G/DL (ref 1.5–4.5)
GLUCOSE SERPL-MCNC: 100 MG/DL (ref 70–99)
GLUCOSE UR QL: NEGATIVE
HBA1C MFR BLD: 6.2 % (ref 4.8–5.6)
HCT VFR BLD AUTO: 37.8 % (ref 34–46.6)
HDLC SERPL-MCNC: 63 MG/DL
HGB BLD-MCNC: 12.6 G/DL (ref 11.1–15.9)
HGB UR QL STRIP: NEGATIVE
IMM GRANULOCYTES # BLD: 0 X10E3/UL (ref 0–0.1)
IMM GRANULOCYTES NFR BLD: 0 %
KETONES UR QL STRIP: NEGATIVE
LDLC SERPL CALC-MCNC: 92 MG/DL (ref 0–99)
LEUKOCYTE ESTERASE UR QL STRIP: ABNORMAL
LYMPHOCYTES # BLD AUTO: 2.2 X10E3/UL (ref 0.7–3.1)
LYMPHOCYTES NFR BLD AUTO: 24 %
MCH RBC QN AUTO: 31.6 PG (ref 26.6–33)
MCHC RBC AUTO-ENTMCNC: 33.3 G/DL (ref 31.5–35.7)
MCV RBC AUTO: 95 FL (ref 79–97)
MICRO URNS: ABNORMAL
MONOCYTES # BLD AUTO: 1 X10E3/UL (ref 0.1–0.9)
MONOCYTES NFR BLD AUTO: 11 %
NEUTROPHILS # BLD AUTO: 5.4 X10E3/UL (ref 1.4–7)
NEUTROPHILS NFR BLD AUTO: 60 %
NITRITE UR QL STRIP: NEGATIVE
PH UR STRIP: 6.5 [PH] (ref 5–7.5)
PLATELET # BLD AUTO: 365 X10E3/UL (ref 150–450)
POTASSIUM SERPL-SCNC: 4.6 MMOL/L (ref 3.5–5.2)
PROT SERPL-MCNC: 6.8 G/DL (ref 6–8.5)
PROT UR QL STRIP: NEGATIVE
RBC # BLD AUTO: 3.99 X10E6/UL (ref 3.77–5.28)
RBC #/AREA URNS HPF: NORMAL /HPF (ref 0–2)
SL AMB VLDL CHOLESTEROL CALC: 12 MG/DL (ref 5–40)
SODIUM SERPL-SCNC: 135 MMOL/L (ref 134–144)
SP GR UR: 1 (ref 1–1.03)
TRIGL SERPL-MCNC: 62 MG/DL (ref 0–149)
TSH SERPL DL<=0.005 MIU/L-ACNC: 1.57 UIU/ML (ref 0.45–4.5)
UROBILINOGEN UR STRIP-ACNC: 0.2 MG/DL (ref 0.2–1)
WBC # BLD AUTO: 9.1 X10E3/UL (ref 3.4–10.8)
WBC #/AREA URNS HPF: NORMAL /HPF (ref 0–5)

## 2025-07-10 ENCOUNTER — HOSPITAL ENCOUNTER (OUTPATIENT)
Dept: RADIOLOGY | Age: 45
Discharge: HOME | End: 2025-07-10
Attending: OBSTETRICS & GYNECOLOGY
Payer: COMMERCIAL

## 2025-07-10 DIAGNOSIS — Z12.31 ENCOUNTER FOR SCREENING MAMMOGRAM FOR MALIGNANT NEOPLASM OF BREAST: ICD-10-CM

## 2025-07-10 PROCEDURE — 77067 SCR MAMMO BI INCL CAD: CPT

## 2025-07-10 PROCEDURE — 77063 BREAST TOMOSYNTHESIS BI: CPT

## (undated) DEVICE — MEDI-VAC YANKAUER SUCTION HANDLE W/BULBOUS AND CONTROL VENT: Brand: CARDINAL HEALTH

## (undated) DEVICE — SUT ETHIBOND 0 SH 30 IN X834H

## (undated) DEVICE — TOWEL SET X-RAY

## (undated) DEVICE — SUT MONOCRYL 4-0 PS-2 18 IN Y496G

## (undated) DEVICE — INTENDED FOR TISSUE SEPARATION, AND OTHER PROCEDURES THAT REQUIRE A SHARP SURGICAL BLADE TO PUNCTURE OR CUT.: Brand: BARD-PARKER SAFETY BLADES SIZE 10, STERILE

## (undated) DEVICE — SUT SILK 0 SH 30 IN K834H

## (undated) DEVICE — DRESSING MEPILEX AG BORDER POST-OP 4 X 12 IN

## (undated) DEVICE — ANTIBACTERIAL UNDYED BRAIDED (POLYGLACTIN 910), SYNTHETIC ABSORBABLE SUTURE: Brand: COATED VICRYL

## (undated) DEVICE — ENDOPATH PNEUMONEEDLE INSUFFLATION NEEDLES WITH LUER LOCK CONNECTORS 120MM: Brand: ENDOPATH

## (undated) DEVICE — GLOVE SRG BIOGEL 5.5

## (undated) DEVICE — TIBURON TRANSVERSE LAPAROTOMY SHEET: Brand: CONVERTORS

## (undated) DEVICE — CHLORAPREP HI-LITE 26ML ORANGE

## (undated) DEVICE — SUT PDS II 1 XLH 96 IN LOOPED Z881G

## (undated) DEVICE — POOLE SUCTION HANDLE: Brand: CARDINAL HEALTH

## (undated) DEVICE — INTENDED FOR TISSUE SEPARATION, AND OTHER PROCEDURES THAT REQUIRE A SHARP SURGICAL BLADE TO PUNCTURE OR CUT.: Brand: BARD-PARKER SAFETY BLADES SIZE 11, STERILE

## (undated) DEVICE — GLOVE INDICATOR UNDERGLOVE SZ 6 BLUE

## (undated) DEVICE — TUBING SUCTION 5MM X 12 FT

## (undated) DEVICE — TISSEEL FIBRIN 10 ML FROZEN

## (undated) DEVICE — INTENDED FOR TISSUE SEPARATION, AND OTHER PROCEDURES THAT REQUIRE A SHARP SURGICAL BLADE TO PUNCTURE OR CUT.: Brand: BARD-PARKER SAFETY BLADES SIZE 15, STERILE

## (undated) DEVICE — PACK PBDS LAP CHOLE RF

## (undated) DEVICE — TRAY FOLEY 16FR URIMETER SURESTEP

## (undated) DEVICE — PROXIMATE SKIN STAPLERS (35 WIDE) CONTAINS 35 STAINLESS STEEL STAPLES (FIXED HEAD): Brand: PROXIMATE

## (undated) DEVICE — TROCAR: Brand: KII FIOS FIRST ENTRY

## (undated) DEVICE — ADHESIVE SKIN HIGH VISCOSITY EXOFIN 1ML

## (undated) DEVICE — PLUMEPEN PRO 10FT

## (undated) DEVICE — SUT SILK 0 30 IN A306H

## (undated) DEVICE — MEDI-VAC YANK SUCT HNDL W/TPRD BULBOUS TIP: Brand: CARDINAL HEALTH

## (undated) DEVICE — 3000CC GUARDIAN II: Brand: GUARDIAN

## (undated) DEVICE — DRESSING MEPILEX AG 4 X 5 IN

## (undated) DEVICE — PROXIMATE RELOADABLE LINEAR STAPLER, 60MM: Brand: PROXIMATE

## (undated) DEVICE — STAPLER GIA HANDLE STAND 4MM

## (undated) DEVICE — TROCAR: Brand: KII® SLEEVE

## (undated) DEVICE — PROXIMATE RELOADABLE LINEAR CUTTER WITH SAFETY LOCK-OUT, 75MM: Brand: PROXIMATE

## (undated) DEVICE — STAPLE ENDO TRI-STAPLE 2.0 BLCK RELOAD XTRA THICK

## (undated) DEVICE — BETHLEHEM MAJOR GENERAL PACK: Brand: CARDINAL HEALTH

## (undated) DEVICE — ELECTRODE BLADE MOD  E-Z CLEAN 6.5IN -0014M

## (undated) DEVICE — PENCIL ELECTROSURG E-Z CLEAN -0035H